# Patient Record
Sex: MALE | Race: WHITE | Employment: PART TIME | URBAN - METROPOLITAN AREA
[De-identification: names, ages, dates, MRNs, and addresses within clinical notes are randomized per-mention and may not be internally consistent; named-entity substitution may affect disease eponyms.]

---

## 2017-02-27 ENCOUNTER — OFFICE VISIT (OUTPATIENT)
Dept: INTERNAL MEDICINE | Age: 67
End: 2017-02-27

## 2017-02-27 VITALS
RESPIRATION RATE: 14 BRPM | DIASTOLIC BLOOD PRESSURE: 70 MMHG | SYSTOLIC BLOOD PRESSURE: 122 MMHG | WEIGHT: 155.2 LBS | HEART RATE: 68 BPM | HEIGHT: 69 IN | BODY MASS INDEX: 22.99 KG/M2

## 2017-02-27 DIAGNOSIS — J30.9 ALLERGIC SINUSITIS: ICD-10-CM

## 2017-02-27 DIAGNOSIS — C80.1 CANCER (HCC): ICD-10-CM

## 2017-02-27 DIAGNOSIS — G43.709 CHRONIC MIGRAINE WITHOUT AURA WITHOUT STATUS MIGRAINOSUS, NOT INTRACTABLE: ICD-10-CM

## 2017-02-27 DIAGNOSIS — C61 PROSTATE CANCER (HCC): ICD-10-CM

## 2017-02-27 DIAGNOSIS — S39.012A LOW BACK STRAIN, INITIAL ENCOUNTER: ICD-10-CM

## 2017-02-27 PROCEDURE — 99214 OFFICE O/P EST MOD 30 MIN: CPT | Performed by: INTERNAL MEDICINE

## 2017-02-27 RX ORDER — MELOXICAM 7.5 MG/1
7.5 TABLET ORAL DAILY
Qty: 30 TABLET | Refills: 3 | Status: SHIPPED | OUTPATIENT
Start: 2017-02-27 | End: 2017-06-14 | Stop reason: ALTCHOICE

## 2017-02-27 ASSESSMENT — ENCOUNTER SYMPTOMS
EYES NEGATIVE: 1
ALLERGIC/IMMUNOLOGIC NEGATIVE: 1
BACK PAIN: 1
GASTROINTESTINAL NEGATIVE: 1
BOWEL INCONTINENCE: 0
RESPIRATORY NEGATIVE: 1

## 2017-06-02 ENCOUNTER — TELEPHONE (OUTPATIENT)
Dept: INTERNAL MEDICINE | Age: 67
End: 2017-06-02

## 2017-06-02 DIAGNOSIS — H40.9 GLAUCOMA, UNSPECIFIED GLAUCOMA, UNSPECIFIED LATERALITY: Primary | ICD-10-CM

## 2017-06-14 ENCOUNTER — OFFICE VISIT (OUTPATIENT)
Dept: INTERNAL MEDICINE | Age: 67
End: 2017-06-14

## 2017-06-14 VITALS
SYSTOLIC BLOOD PRESSURE: 120 MMHG | DIASTOLIC BLOOD PRESSURE: 78 MMHG | WEIGHT: 149.6 LBS | RESPIRATION RATE: 14 BRPM | BODY MASS INDEX: 22.16 KG/M2 | HEIGHT: 69 IN | HEART RATE: 70 BPM

## 2017-06-14 DIAGNOSIS — C80.1 CANCER (HCC): ICD-10-CM

## 2017-06-14 DIAGNOSIS — K21.00 REFLUX ESOPHAGITIS: ICD-10-CM

## 2017-06-14 DIAGNOSIS — S39.012A LOW BACK STRAIN, INITIAL ENCOUNTER: ICD-10-CM

## 2017-06-14 DIAGNOSIS — M72.0 DUPUYTREN'S CONTRACTURE: ICD-10-CM

## 2017-06-14 DIAGNOSIS — M72.0 DUPUYTREN'S CONTRACTURE OF RIGHT HAND: ICD-10-CM

## 2017-06-14 DIAGNOSIS — C61 PROSTATE CANCER (HCC): ICD-10-CM

## 2017-06-14 DIAGNOSIS — R19.5 CHANGE IN STOOL: ICD-10-CM

## 2017-06-14 DIAGNOSIS — G43.709 CHRONIC MIGRAINE WITHOUT AURA WITHOUT STATUS MIGRAINOSUS, NOT INTRACTABLE: Primary | ICD-10-CM

## 2017-06-14 DIAGNOSIS — J30.9 ALLERGIC SINUSITIS: ICD-10-CM

## 2017-06-14 PROCEDURE — 99214 OFFICE O/P EST MOD 30 MIN: CPT | Performed by: INTERNAL MEDICINE

## 2017-06-14 ASSESSMENT — ENCOUNTER SYMPTOMS
RESPIRATORY NEGATIVE: 1
BACK PAIN: 1
ALLERGIC/IMMUNOLOGIC NEGATIVE: 1
GASTROINTESTINAL NEGATIVE: 1
EYES NEGATIVE: 1

## 2017-06-15 ENCOUNTER — TELEPHONE (OUTPATIENT)
Dept: INTERNAL MEDICINE | Age: 67
End: 2017-06-15

## 2017-06-15 DIAGNOSIS — Z11.59 NEED FOR HEPATITIS C SCREENING TEST: Primary | ICD-10-CM

## 2017-06-16 DIAGNOSIS — J30.9 ALLERGIC SINUSITIS: ICD-10-CM

## 2017-06-16 DIAGNOSIS — K21.00 REFLUX ESOPHAGITIS: ICD-10-CM

## 2017-06-16 DIAGNOSIS — S39.012A LOW BACK STRAIN, INITIAL ENCOUNTER: ICD-10-CM

## 2017-06-16 DIAGNOSIS — G43.709 CHRONIC MIGRAINE WITHOUT AURA WITHOUT STATUS MIGRAINOSUS, NOT INTRACTABLE: ICD-10-CM

## 2017-06-16 DIAGNOSIS — Z11.59 NEED FOR HEPATITIS C SCREENING TEST: ICD-10-CM

## 2017-06-16 DIAGNOSIS — C61 PROSTATE CANCER (HCC): ICD-10-CM

## 2017-06-16 DIAGNOSIS — M72.0 DUPUYTREN'S CONTRACTURE OF RIGHT HAND: ICD-10-CM

## 2017-06-16 DIAGNOSIS — C80.1 CANCER (HCC): ICD-10-CM

## 2017-06-16 LAB — HEPATITIS C ANTIBODY INTERPRETATION: NORMAL

## 2017-10-03 ENCOUNTER — OFFICE VISIT (OUTPATIENT)
Dept: ENT CLINIC | Age: 67
End: 2017-10-03

## 2017-10-03 VITALS
HEIGHT: 70 IN | WEIGHT: 145 LBS | DIASTOLIC BLOOD PRESSURE: 63 MMHG | HEART RATE: 72 BPM | SYSTOLIC BLOOD PRESSURE: 106 MMHG | BODY MASS INDEX: 20.76 KG/M2

## 2017-10-03 DIAGNOSIS — J30.9 ALLERGIC SINUSITIS: ICD-10-CM

## 2017-10-03 DIAGNOSIS — H61.23 BILATERAL IMPACTED CERUMEN: Primary | ICD-10-CM

## 2017-10-03 PROCEDURE — 69210 REMOVE IMPACTED EAR WAX UNI: CPT | Performed by: OTOLARYNGOLOGY

## 2017-10-03 PROCEDURE — 99212 OFFICE O/P EST SF 10 MIN: CPT | Performed by: OTOLARYNGOLOGY

## 2017-11-20 ENCOUNTER — OFFICE VISIT (OUTPATIENT)
Dept: INTERNAL MEDICINE | Age: 67
End: 2017-11-20

## 2017-11-20 VITALS
HEART RATE: 68 BPM | DIASTOLIC BLOOD PRESSURE: 78 MMHG | HEIGHT: 69 IN | WEIGHT: 152.4 LBS | SYSTOLIC BLOOD PRESSURE: 122 MMHG | BODY MASS INDEX: 22.57 KG/M2

## 2017-11-20 DIAGNOSIS — C61 PROSTATE CANCER (HCC): ICD-10-CM

## 2017-11-20 DIAGNOSIS — G43.709 CHRONIC MIGRAINE WITHOUT AURA WITHOUT STATUS MIGRAINOSUS, NOT INTRACTABLE: ICD-10-CM

## 2017-11-20 DIAGNOSIS — K21.00 REFLUX ESOPHAGITIS: ICD-10-CM

## 2017-11-20 DIAGNOSIS — R10.33 PERIUMBILICAL ABDOMINAL PAIN: Primary | ICD-10-CM

## 2017-11-20 DIAGNOSIS — R10.33 PERIUMBILICAL ABDOMINAL PAIN: ICD-10-CM

## 2017-11-20 DIAGNOSIS — C80.1 CANCER (HCC): ICD-10-CM

## 2017-11-20 DIAGNOSIS — J30.9 ALLERGIC SINUSITIS: ICD-10-CM

## 2017-11-20 DIAGNOSIS — R19.5 CHANGE IN STOOL: ICD-10-CM

## 2017-11-20 LAB
A/G RATIO: 1.7 (ref 1.1–2.2)
ALBUMIN SERPL-MCNC: 4.4 G/DL (ref 3.4–5)
ALP BLD-CCNC: 61 U/L (ref 40–129)
ALT SERPL-CCNC: 17 U/L (ref 10–40)
ANION GAP SERPL CALCULATED.3IONS-SCNC: 10 MMOL/L (ref 3–16)
AST SERPL-CCNC: 22 U/L (ref 15–37)
BASOPHILS ABSOLUTE: 0 K/UL (ref 0–0.2)
BASOPHILS RELATIVE PERCENT: 0.8 %
BILIRUB SERPL-MCNC: 0.3 MG/DL (ref 0–1)
BUN BLDV-MCNC: 19 MG/DL (ref 7–20)
CALCIUM SERPL-MCNC: 9.7 MG/DL (ref 8.3–10.6)
CHLORIDE BLD-SCNC: 97 MMOL/L (ref 99–110)
CO2: 32 MMOL/L (ref 21–32)
CREAT SERPL-MCNC: 1.2 MG/DL (ref 0.8–1.3)
EOSINOPHILS ABSOLUTE: 0.1 K/UL (ref 0–0.6)
EOSINOPHILS RELATIVE PERCENT: 1.9 %
GFR AFRICAN AMERICAN: >60
GFR NON-AFRICAN AMERICAN: 60
GLOBULIN: 2.6 G/DL
GLUCOSE BLD-MCNC: 87 MG/DL (ref 70–99)
HCT VFR BLD CALC: 40.4 % (ref 40.5–52.5)
HEMOGLOBIN: 13.7 G/DL (ref 13.5–17.5)
LYMPHOCYTES ABSOLUTE: 1.4 K/UL (ref 1–5.1)
LYMPHOCYTES RELATIVE PERCENT: 26.8 %
MCH RBC QN AUTO: 31.1 PG (ref 26–34)
MCHC RBC AUTO-ENTMCNC: 33.8 G/DL (ref 31–36)
MCV RBC AUTO: 92.1 FL (ref 80–100)
MONOCYTES ABSOLUTE: 0.8 K/UL (ref 0–1.3)
MONOCYTES RELATIVE PERCENT: 14.8 %
NEUTROPHILS ABSOLUTE: 2.9 K/UL (ref 1.7–7.7)
NEUTROPHILS RELATIVE PERCENT: 55.7 %
PDW BLD-RTO: 12.9 % (ref 12.4–15.4)
PLATELET # BLD: 192 K/UL (ref 135–450)
PMV BLD AUTO: 8.5 FL (ref 5–10.5)
POTASSIUM SERPL-SCNC: 4.6 MMOL/L (ref 3.5–5.1)
RBC # BLD: 4.39 M/UL (ref 4.2–5.9)
SODIUM BLD-SCNC: 139 MMOL/L (ref 136–145)
TOTAL PROTEIN: 7 G/DL (ref 6.4–8.2)
WBC # BLD: 5.2 K/UL (ref 4–11)

## 2017-11-20 PROCEDURE — 99214 OFFICE O/P EST MOD 30 MIN: CPT | Performed by: INTERNAL MEDICINE

## 2017-11-20 ASSESSMENT — ENCOUNTER SYMPTOMS
BELCHING: 1
EYES NEGATIVE: 1
BACK PAIN: 1
NAUSEA: 0
DIARRHEA: 0
BLOOD IN STOOL: 0
RESPIRATORY NEGATIVE: 1
VOMITING: 0
CONSTIPATION: 0
ALLERGIC/IMMUNOLOGIC NEGATIVE: 1
ABDOMINAL PAIN: 1

## 2017-12-21 ASSESSMENT — LIFESTYLE VARIABLES
HOW OFTEN DO YOU HAVE SIX OR MORE DRINKS ON ONE OCCASION: 0
AUDIT-C TOTAL SCORE: 1
HOW MANY STANDARD DRINKS CONTAINING ALCOHOL DO YOU HAVE ON A TYPICAL DAY: 0
HOW OFTEN DO YOU HAVE A DRINK CONTAINING ALCOHOL: 1

## 2017-12-22 ENCOUNTER — OFFICE VISIT (OUTPATIENT)
Dept: INTERNAL MEDICINE | Age: 67
End: 2017-12-22

## 2017-12-22 VITALS
DIASTOLIC BLOOD PRESSURE: 68 MMHG | HEIGHT: 69 IN | WEIGHT: 147.8 LBS | BODY MASS INDEX: 21.89 KG/M2 | HEART RATE: 68 BPM | RESPIRATION RATE: 14 BRPM | SYSTOLIC BLOOD PRESSURE: 126 MMHG

## 2017-12-22 DIAGNOSIS — M72.0 DUPUYTREN'S CONTRACTURE OF RIGHT HAND: ICD-10-CM

## 2017-12-22 DIAGNOSIS — Z00.00 WELL ADULT EXAM: Primary | ICD-10-CM

## 2017-12-22 DIAGNOSIS — C61 PROSTATE CANCER (HCC): ICD-10-CM

## 2017-12-22 DIAGNOSIS — G43.709 CHRONIC MIGRAINE WITHOUT AURA WITHOUT STATUS MIGRAINOSUS, NOT INTRACTABLE: ICD-10-CM

## 2017-12-22 DIAGNOSIS — R10.33 PERIUMBILICAL ABDOMINAL PAIN: ICD-10-CM

## 2017-12-22 DIAGNOSIS — Z00.00 ROUTINE GENERAL MEDICAL EXAMINATION AT A HEALTH CARE FACILITY: ICD-10-CM

## 2017-12-22 DIAGNOSIS — K21.00 REFLUX ESOPHAGITIS: ICD-10-CM

## 2017-12-22 DIAGNOSIS — J30.9 ALLERGIC SINUSITIS: ICD-10-CM

## 2017-12-22 DIAGNOSIS — C80.1 CANCER (HCC): ICD-10-CM

## 2017-12-22 PROBLEM — H61.23 BILATERAL IMPACTED CERUMEN: Status: RESOLVED | Noted: 2017-10-03 | Resolved: 2017-12-22

## 2017-12-22 PROBLEM — R19.5 CHANGE IN STOOL: Status: RESOLVED | Noted: 2017-06-14 | Resolved: 2017-12-22

## 2017-12-22 PROCEDURE — 99214 OFFICE O/P EST MOD 30 MIN: CPT | Performed by: INTERNAL MEDICINE

## 2017-12-22 PROCEDURE — G0439 PPPS, SUBSEQ VISIT: HCPCS | Performed by: INTERNAL MEDICINE

## 2017-12-22 ASSESSMENT — ENCOUNTER SYMPTOMS
RESPIRATORY NEGATIVE: 1
GASTROINTESTINAL NEGATIVE: 1
EYES NEGATIVE: 1
ALLERGIC/IMMUNOLOGIC NEGATIVE: 1

## 2017-12-22 ASSESSMENT — PATIENT HEALTH QUESTIONNAIRE - PHQ9: SUM OF ALL RESPONSES TO PHQ QUESTIONS 1-9: 0

## 2017-12-22 NOTE — ASSESSMENT & PLAN NOTE
Within normal limits for age- cont to work part time  no ADL issues,immunizations up to date, no depression ,no cognitive impairment  Colonoscopy up to date  Eye exam up to date  Exercises as tolerated    No  living will but does  want resuscitation    Labs pending   D/Wpt

## 2017-12-22 NOTE — PROGRESS NOTES
behavior is normal. Thought content normal.       Assessment:              Plan:                Allergic sinusitis  Prn meds as noted     Cancer (HCC)  Cont as per Dr Brynn Clark     Dupuytren's contracture of right hand  Stable as noted     Migraine  occ symptoms prn meds     Prostate cancer ([address]  Cont as per Dr Brynn Clark    Reflux esophagitis  Controled with diet and meds     Well adult exam  Within normal limits for age- cont to work part time  no ADL issues,immunizations up to date, no depression ,no cognitive impairment  Colonoscopy up to date  Eye exam up to date  Exercises as tolerated    No  living will but does  want resuscitation    Labs pending   D/Wpt     Periumbilical abdominal pain  To have EGD and colonoscopy as noted      Adra Darius Nick  1950    No Known Allergies  Current Outpatient Prescriptions   Medication Sig Dispense Refill    fluticasone (FLONASE) 50 MCG/ACT nasal spray 1 spray by Nasal route daily      latanoprost (XALATAN) 0.005 % ophthalmic solution Place 1 drop into the right eye nightly      omeprazole (PRILOSEC) 20 MG capsule TAKE 1 CAPSULE DAILY 90 capsule 0     No current facility-administered medications for this visit. Vitals:    12/22/17 0958   BP: 126/68   Site: Right Arm   Position: Sitting   Cuff Size: Medium Adult   Pulse: 68   Resp: 14   Weight: 147 lb 12.8 oz (67 kg)   Height: 5' 9\" (1.753 m)     Body mass index is 21.83 kg/m².      Wt Readings from Last 3 Encounters:   12/22/17 147 lb 12.8 oz (67 kg)   11/20/17 152 lb 6.4 oz (69.1 kg)   10/03/17 145 lb (65.8 kg)     BP Readings from Last 3 Encounters:   12/22/17 126/68   11/20/17 122/78   10/03/17 106/63       Consultants:   Patient Care Team:  Pascale Cummings MD as PCP - General (Internal Medicine)  Pascale Cummings MD as PCP - [facility] Attributed Provider  Joanne Bernabe MD as Consulting Physician (Urology)  Aba Puga MD as Consulting Physician (Radiation Oncology)  Leigh Moseley MD as Consulting Nocturia     Reflux esophagitis     Shoulder pain      Past Surgical History:   Procedure Laterality Date    COLONOSCOPY   &     Dr Maile Mccann repeat 2018   [address]    detached retina bilat     FINGER TRIGGER RELEASE Left 13    middle finger    PROSTATE SURGERY  10/2013    mult Bx  1 +Bx -s/p cyber knife 2014     Family History   Problem Relation Age of Onset    Cancer Mother      Social History     Social History    Marital status: Single     Spouse name: N/A    Number of children: N/A    Years of education: N/A     Occupational History          Social History Main Topics    Smoking status: Former Smoker     Quit date: 1980    Smokeless tobacco: Never Used    Alcohol use 0.0 oz/week      Comment: occ    Drug use: Yes    Sexual activity: Not on file     Other Topics Concern    Not on file     Social History Narrative    No narrative on file     }        Visual Acuity: Corrected:            L  20/100            R 20/20    Cognitive Screening:  Clock drawing test score: 5/5. Mini-mental status exam score: not needed. Assessment/Plan:  Bradley Mccoy was seen today for medicare awv. Diagnoses and all orders for this visit:    Allergic sinusitis    Cancer ([facility] [place] [address]    Dupuytren's contracture of right hand    Chronic migraine without aura without status migrainosus, not intractable    Prostate cancer ([facility] [place] [address]    Reflux esophagitis    Well adult exam    Periumbilical abdominal pain      Medicare Annual Wellness Visit  Name: Donald Gould Date: 2017   MRN: [id number] Sex: Male   Age: 69 y.o. Ethnicity: Non-/Non    : 1950 Race: Shay Nick is here for Medicare AWV    Screenings for behavioral, psychosocial and functional/safety risks, and cognitive dysfunction are all negative except as indicated below.  These results, as well as other patient data from the Health Risk Assessment form, are documented in Flowsheets linked to this Encounter. No Known Allergies  Prior to Visit Medications    Medication Sig Taking? Authorizing Provider   fluticasone (FLONASE) 50 MCG/ACT nasal spray 1 spray by Nasal route daily Yes Historical Provider, MD   latanoprost (XALATAN) 0.005 % ophthalmic solution Place 1 drop into the right eye nightly Yes Historical Provider, MD   omeprazole (PRILOSEC) 20 MG capsule TAKE 1 CAPSULE DAILY Yes Baldev Vogt MD     Past Medical History:   Diagnosis Date    Cancer [facility]) 2014    prostate Dr Sherie Coto s/p rads with cyber knife     Dupuytrens contracture     Foot pain, left     Migraine     Mole (skin)     Nocturia     Reflux esophagitis     Shoulder pain      Past Surgical History:   Procedure Laterality Date    COLONOSCOPY  2000 & 2013    Dr Justin Grams repeat 2018   [address]    detached retina bilat     FINGER TRIGGER RELEASE Left 8/13/13    middle finger    PROSTATE SURGERY  10/2013    mult Bx  1 +Bx -s/p cyber knife 9/2014     Family History   Problem Relation Age of Onset    Cancer Mother        CareTeam (Including outside providers/suppliers regularly involved in providing care):   Patient Care Team:  Baldev Vogt MD as PCP - General (Internal Medicine)  Baldev Vogt MD as PCP - [facility] Attributed Provider  Mango Terrazas MD as Consulting Physician (Urology)  Leandra Alonso MD as Consulting Physician (Radiation Oncology)  Regina Huggins MD as Consulting Physician (Otolaryngology)    Wt Readings from Last 3 Encounters:   12/22/17 147 lb 12.8 oz (67 kg)   11/20/17 152 lb 6.4 oz (69.1 kg)   10/03/17 145 lb (65.8 kg)     Vitals:    12/22/17 0958   BP: 126/68   Site: Right Arm   Position: Sitting   Cuff Size: Medium Adult   Pulse: 68   Resp: 14   Weight: 147 lb 12.8 oz (67 kg)   Height: 5' 9\" (1.753 m)       See above    Patient's complete Health Risk Assessment and screening values have been reviewed and are found in Flowsheets.  The following problems were reviewed today and where indicated follow up appointments were made and/or referrals ordered.     Positive Risk Factor Screenings with Interventions:     Substance Abuse:  Social History     Tobacco History     Smoking Status  Former Smoker Quit date  1/1/1980    Smokeless Tobacco Use  Never Used          Alcohol History     Alcohol Use Status  Yes Drinks/Week  0 Standard drinks or equivalent per week Amount  0.0 oz alcohol/wk Comment  occ          Drug Use     Drug Use Status  Yes          Sexual Activity     Sexually Active  Not Asked               Audit Questionnaire: Screen for Alcohol Misuse  How often do you have a drink containing alcohol?: Monthly or less  How many standard drinks containing alcohol do you have on a typical day when drinking?: One or two  How often do you have six or more drinks on one occasion?: Never  Audit-C Score: 1  Substance Abuse Interventions:  · None indicated    General Health:  General  In general, how would you say your health is?: Good  In the past 7 days, have you experienced any of the following?: (!) (P) Stress  Do you get the social and emotional support that you need?: (P) Yes  Do you have a Living Will?: (!) (P) No  General Health Risk Interventions:  · No Living Will: additional information provided    Safety:  Safety  Do you have working smoke detectors?: (P) Yes  Have all throw rugs been removed or fastened?: (!) (P) No  Do you have non-slip mats in all bathtubs?: (!) (P) No  Do all of your stairways have a railing or banister?: (P) Yes  Are your doorways, halls and stairs free of clutter?: (P) Yes  Do you always fasten your seatbelt when you are in a car?: (P) Yes  Safety Interventions:  · Home safety tips provided      Personalized Preventive Plan   Current Health Maintenance Status  Immunization History   Administered Date(s) Administered    Influenza, High Dose 09/20/2014, 10/27/2015, 10/14/2016, 10/20/2017    Pneumococcal 13-valent Conjugate (Birtha Pries) 07/22/2015  Pneumococcal Polysaccharide (Edyotuswy14) 02/18/2013    Tdap (Boostrix, Adacel) 09/06/2008    Zoster 12/30/2015        Health Maintenance   Topic Date Due    AAA screen  1950    Colon cancer screen colonoscopy  06/25/2018    DTaP/Tdap/Td vaccine (2 - Td) 09/06/2018    Lipid screen  08/01/2021    Zostavax vaccine  Completed    Flu vaccine  Completed    Pneumococcal low/med risk  Completed    Hepatitis C screen  Completed     Recommendations for Preventive Services Due: see orders.   Recommended screening schedule for the next 5-10 years is provided to the patient in written form: see Patient Instructions/AVS.

## 2018-03-27 ENCOUNTER — HOSPITAL ENCOUNTER (OUTPATIENT)
Dept: CT IMAGING | Age: 68
Discharge: HOME | End: 2018-03-27
Attending: INTERNAL MEDICINE | Admitting: INTERNAL MEDICINE

## 2018-03-27 DIAGNOSIS — R10.33 PERIUMBILICAL PAIN: ICD-10-CM

## 2018-03-27 DIAGNOSIS — R10.33 ABDOMINAL CRAMPING, PERIUMBILICAL: ICD-10-CM

## 2018-05-14 ENCOUNTER — OFFICE VISIT (OUTPATIENT)
Dept: INTERNAL MEDICINE | Age: 68
End: 2018-05-14

## 2018-05-14 VITALS
HEIGHT: 69 IN | DIASTOLIC BLOOD PRESSURE: 60 MMHG | BODY MASS INDEX: 22.54 KG/M2 | RESPIRATION RATE: 14 BRPM | SYSTOLIC BLOOD PRESSURE: 110 MMHG | HEART RATE: 66 BPM | WEIGHT: 152.2 LBS

## 2018-05-14 DIAGNOSIS — K21.00 REFLUX ESOPHAGITIS: ICD-10-CM

## 2018-05-14 DIAGNOSIS — R42 VERTIGO: ICD-10-CM

## 2018-05-14 DIAGNOSIS — C61 PROSTATE CANCER (HCC): ICD-10-CM

## 2018-05-14 DIAGNOSIS — G43.709 CHRONIC MIGRAINE WITHOUT AURA WITHOUT STATUS MIGRAINOSUS, NOT INTRACTABLE: ICD-10-CM

## 2018-05-14 DIAGNOSIS — J30.9 ALLERGIC SINUSITIS: ICD-10-CM

## 2018-05-14 PROCEDURE — 99214 OFFICE O/P EST MOD 30 MIN: CPT | Performed by: INTERNAL MEDICINE

## 2018-05-14 ASSESSMENT — ENCOUNTER SYMPTOMS
ALLERGIC/IMMUNOLOGIC NEGATIVE: 1
GASTROINTESTINAL NEGATIVE: 1
RHINORRHEA: 1
SINUS PRESSURE: 0
EYES NEGATIVE: 1
RESPIRATORY NEGATIVE: 1

## 2018-09-05 ENCOUNTER — TELEPHONE (OUTPATIENT)
Dept: INTERNAL MEDICINE CLINIC | Age: 68
End: 2018-09-05

## 2018-09-05 DIAGNOSIS — R10.33 PERIUMBILICAL ABDOMINAL PAIN: ICD-10-CM

## 2018-09-05 NOTE — TELEPHONE ENCOUNTER
Patient called in stating he has been having abdominal pain  The pain has been going on and off for awhile. He keeps getting light headed throughout the day. He would like to have some blood work done and he would also like to set up an appointment sooner than October. Please call.

## 2018-09-05 NOTE — TELEPHONE ENCOUNTER
Left message on machine for patient per Dr. Halle Sahu. Orders are placed in Epic and Patient advised.

## 2018-10-08 ENCOUNTER — OFFICE VISIT (OUTPATIENT)
Dept: INTERNAL MEDICINE CLINIC | Age: 68
End: 2018-10-08
Payer: COMMERCIAL

## 2018-10-08 VITALS
RESPIRATION RATE: 16 BRPM | SYSTOLIC BLOOD PRESSURE: 126 MMHG | WEIGHT: 147 LBS | DIASTOLIC BLOOD PRESSURE: 78 MMHG | BODY MASS INDEX: 21.77 KG/M2 | HEART RATE: 68 BPM | HEIGHT: 69 IN

## 2018-10-08 DIAGNOSIS — G43.709 CHRONIC MIGRAINE WITHOUT AURA WITHOUT STATUS MIGRAINOSUS, NOT INTRACTABLE: ICD-10-CM

## 2018-10-08 DIAGNOSIS — C61 PROSTATE CANCER (HCC): ICD-10-CM

## 2018-10-08 DIAGNOSIS — R53.82 CHRONIC FATIGUE: Primary | ICD-10-CM

## 2018-10-08 DIAGNOSIS — F41.9 ANXIETY: ICD-10-CM

## 2018-10-08 DIAGNOSIS — K21.00 REFLUX ESOPHAGITIS: ICD-10-CM

## 2018-10-08 DIAGNOSIS — S39.012D STRAIN OF LUMBAR REGION, SUBSEQUENT ENCOUNTER: ICD-10-CM

## 2018-10-08 PROCEDURE — 99214 OFFICE O/P EST MOD 30 MIN: CPT | Performed by: INTERNAL MEDICINE

## 2018-10-08 ASSESSMENT — ENCOUNTER SYMPTOMS
ALLERGIC/IMMUNOLOGIC NEGATIVE: 1
RESPIRATORY NEGATIVE: 1
SINUS PRESSURE: 0
GASTROINTESTINAL NEGATIVE: 1
RHINORRHEA: 1
EYES NEGATIVE: 1

## 2018-10-08 ASSESSMENT — PATIENT HEALTH QUESTIONNAIRE - PHQ9
1. LITTLE INTEREST OR PLEASURE IN DOING THINGS: 0
SUM OF ALL RESPONSES TO PHQ9 QUESTIONS 1 & 2: 0
2. FEELING DOWN, DEPRESSED OR HOPELESS: 0
SUM OF ALL RESPONSES TO PHQ QUESTIONS 1-9: 0
SUM OF ALL RESPONSES TO PHQ QUESTIONS 1-9: 0

## 2018-10-08 NOTE — PROGRESS NOTES
rhinorrhea. Negative for ear discharge, ear pain and sinus pressure. Eyes: Negative. Cataract L eye,and Glaucoma R eye    Respiratory: Negative. Cardiovascular: Negative. Gastrointestinal: Negative. Occ GERD   Repeat colonoscopy 2013 polyp repeat 2018   Endocrine: Negative. Genitourinary: Positive for frequency and urgency. Occ nocturia-1 + BX for prostate CA  S/p cyber knife 9/2014 now PSA decreased  as noted cont with Dr Morena Willams now PSA has gone up a little to F/U as noted    Musculoskeletal: Positive for arthralgias (occ L shoulder ). Skin: Negative. Allergic/Immunologic: Negative. Neurological: Positive for dizziness and headaches. Hematological: Negative. Psychiatric/Behavioral: Negative. Objective:   Physical Exam:  Physical Exam   Constitutional: He is oriented to person, place, and time. He appears well-developed and well-nourished. HENT:   Head: Normocephalic and atraumatic. Nose: Nose normal.   Mouth/Throat: Oropharynx is clear and moist.   Eyes: Pupils are equal, round, and reactive to light. Conjunctivae and EOM are normal.   Neck: Normal range of motion. Neck supple. No tracheal deviation present. No thyromegaly present. Cardiovascular: Normal rate, regular rhythm, normal heart sounds and intact distal pulses. Pulmonary/Chest: Effort normal and breath sounds normal. No respiratory distress. He has no wheezes. Abdominal: Soft. Bowel sounds are normal.   Genitourinary:   Genitourinary Comments: As per Dr Morena Willams    Musculoskeletal: Normal range of motion. Neurological: He is alert and oriented to person, place, and time. He has normal reflexes. Skin: Skin is warm and dry. Psychiatric: He has a normal mood and affect.  His behavior is normal. Thought content normal.       /78 (Site: Right Upper Arm, Position: Sitting, Cuff Size: Medium Adult)   Pulse 68   Resp 16   Ht 5' 9\" (1.753 m)   Wt 147 lb (66.7 kg)   BMI 21.71 kg/m²

## 2018-12-24 ENCOUNTER — OFFICE VISIT (OUTPATIENT)
Dept: INTERNAL MEDICINE CLINIC | Age: 68
End: 2018-12-24
Payer: MEDICARE

## 2018-12-24 VITALS
BODY MASS INDEX: 22.31 KG/M2 | SYSTOLIC BLOOD PRESSURE: 122 MMHG | HEART RATE: 66 BPM | RESPIRATION RATE: 12 BRPM | HEIGHT: 69 IN | DIASTOLIC BLOOD PRESSURE: 70 MMHG | WEIGHT: 150.6 LBS

## 2018-12-24 DIAGNOSIS — S39.012D STRAIN OF LUMBAR REGION, SUBSEQUENT ENCOUNTER: ICD-10-CM

## 2018-12-24 DIAGNOSIS — C80.1 CANCER (HCC): ICD-10-CM

## 2018-12-24 DIAGNOSIS — F41.9 ANXIETY: ICD-10-CM

## 2018-12-24 DIAGNOSIS — M72.0 DUPUYTREN'S CONTRACTURE OF RIGHT HAND: ICD-10-CM

## 2018-12-24 DIAGNOSIS — C61 PROSTATE CANCER (HCC): ICD-10-CM

## 2018-12-24 DIAGNOSIS — R42 VERTIGO: ICD-10-CM

## 2018-12-24 DIAGNOSIS — G43.709 CHRONIC MIGRAINE WITHOUT AURA WITHOUT STATUS MIGRAINOSUS, NOT INTRACTABLE: ICD-10-CM

## 2018-12-24 DIAGNOSIS — M54.32 SCIATICA OF LEFT SIDE: ICD-10-CM

## 2018-12-24 DIAGNOSIS — K21.00 REFLUX ESOPHAGITIS: ICD-10-CM

## 2018-12-24 DIAGNOSIS — J30.9 ALLERGIC SINUSITIS: ICD-10-CM

## 2018-12-24 DIAGNOSIS — Z00.00 ROUTINE GENERAL MEDICAL EXAMINATION AT A HEALTH CARE FACILITY: ICD-10-CM

## 2018-12-24 DIAGNOSIS — Z00.00 WELL ADULT EXAM: Primary | ICD-10-CM

## 2018-12-24 DIAGNOSIS — M72.0 DUPUYTREN'S CONTRACTURE: ICD-10-CM

## 2018-12-24 PROCEDURE — 99214 OFFICE O/P EST MOD 30 MIN: CPT | Performed by: INTERNAL MEDICINE

## 2018-12-24 PROCEDURE — G0439 PPPS, SUBSEQ VISIT: HCPCS | Performed by: INTERNAL MEDICINE

## 2018-12-24 ASSESSMENT — PATIENT HEALTH QUESTIONNAIRE - PHQ9
SUM OF ALL RESPONSES TO PHQ QUESTIONS 1-9: 0
SUM OF ALL RESPONSES TO PHQ QUESTIONS 1-9: 0

## 2018-12-24 ASSESSMENT — LIFESTYLE VARIABLES
AUDIT-C TOTAL SCORE: 1
HOW OFTEN DO YOU HAVE SIX OR MORE DRINKS ON ONE OCCASION: 0
HOW MANY STANDARD DRINKS CONTAINING ALCOHOL DO YOU HAVE ON A TYPICAL DAY: 0
HOW OFTEN DO YOU HAVE A DRINK CONTAINING ALCOHOL: 1

## 2018-12-24 ASSESSMENT — ENCOUNTER SYMPTOMS
GASTROINTESTINAL NEGATIVE: 1
RESPIRATORY NEGATIVE: 1
EYES NEGATIVE: 1
ALLERGIC/IMMUNOLOGIC NEGATIVE: 1

## 2018-12-24 ASSESSMENT — ANXIETY QUESTIONNAIRES: GAD7 TOTAL SCORE: 1

## 2018-12-24 NOTE — PROGRESS NOTES
Subjective:      Patient ID: Robert Clark is a 77 y.o. male. HPI  Here today for medicare  complete physical and review of chronic problems as listed under assessment and plan,no new c/o feels goods          No Known Allergies    Current Outpatient Prescriptions   Medication Sig Dispense Refill    latanoprost (XALATAN) 0.005 % ophthalmic solution Place 1 drop into the right eye nightly      omeprazole (PRILOSEC) 20 MG capsule TAKE 1 CAPSULE DAILY 90 capsule 0     No current facility-administered medications for this visit. Past Medical History:   Diagnosis Date    [facility]    prostate Dr Moriah Murdock s/p rads with cyber knife     Dupuytrens contracture     Foot pain, left     Migraine     Mole (skin)     Nocturia     Reflux esophagitis     Shoulder pain        Family History   Problem Relation Age of Onset    Cancer Mother     Stroke Brother        Past Surgical History:   Procedure Laterality Date    COLONOSCOPY  2000 & 2013    Dr Hurtado Look repeat 2018    COLONOSCOPY  02/08/2018    Dr. Fernando Carr polyp removed0- repeat 2022   [address]    detached retina bilat     FINGER TRIGGER RELEASE Left 8/13/13    middle finger    PROSTATE SURGERY  10/2013    mult Bx  1 +Bx -s/p cyber knife 9/2014       Social History     Social History    Marital status: Single     Spouse name: N/A    Number of children: N/A    Years of education: N/A     Occupational History          Social History Main Topics    Smoking status: Former Smoker     Packs/day: 1.00     Years: 10.00     Quit date: 1/1/1980    Smokeless tobacco: Never Used    Alcohol use 0.0 oz/week      Comment: occ    Drug use: Yes    Sexual activity: Not on file     Other Topics Concern    Not on file     Social History Narrative    No narrative on file       Review of Systems  Review of Systems   Constitutional: Negative. HENT: Negative. Eyes: Negative.           S/P cataracts   Respiratory:

## 2018-12-24 NOTE — ASSESSMENT & PLAN NOTE
Within normal limits for age- cont to work no ADL issues,immunizations up to date, no depression ,no cognitive impairment  Colonoscopy up to date  Eye exam up to date  Exercises as tolerated    No living will but does not want resuscitation   Findings and recommendations discussed with Pt

## 2019-01-08 ENCOUNTER — HOSPITAL ENCOUNTER (OUTPATIENT)
Dept: MRI IMAGING | Age: 69
Discharge: HOME | End: 2019-01-08
Payer: MEDICARE

## 2019-01-08 DIAGNOSIS — S39.012D STRAIN OF LUMBAR REGION, SUBSEQUENT ENCOUNTER: ICD-10-CM

## 2019-01-08 DIAGNOSIS — M54.32 SCIATICA OF LEFT SIDE: ICD-10-CM

## 2019-01-08 PROCEDURE — 72148 MRI LUMBAR SPINE W/O DYE: CPT

## 2019-01-09 ENCOUNTER — TELEPHONE (OUTPATIENT)
Dept: INTERNAL MEDICINE CLINIC | Age: 69
End: 2019-01-09

## 2019-01-10 ENCOUNTER — TELEPHONE (OUTPATIENT)
Dept: INTERNAL MEDICINE CLINIC | Age: 69
End: 2019-01-10

## 2019-01-10 DIAGNOSIS — M43.06 LUMBAR SPONDYLOLYSIS: ICD-10-CM

## 2019-01-10 DIAGNOSIS — M51.26 LUMBAR DISC HERNIATION: Primary | ICD-10-CM

## 2019-01-16 ENCOUNTER — TELEPHONE (OUTPATIENT)
Dept: INTERNAL MEDICINE CLINIC | Age: 69
End: 2019-01-16

## 2019-01-23 PROBLEM — Z00.00 WELL ADULT EXAM: Status: RESOLVED | Noted: 2018-12-24 | Resolved: 2019-01-23

## 2019-02-12 ENCOUNTER — HOSPITAL ENCOUNTER (OUTPATIENT)
Age: 69
Discharge: HOME | End: 2019-02-12
Payer: MEDICARE

## 2019-02-12 ENCOUNTER — HOSPITAL ENCOUNTER (OUTPATIENT)
Dept: GENERAL RADIOLOGY | Age: 69
Discharge: HOME | End: 2019-02-12
Payer: MEDICARE

## 2019-02-12 ENCOUNTER — OFFICE VISIT (OUTPATIENT)
Dept: INTERNAL MEDICINE CLINIC | Age: 69
End: 2019-02-12
Payer: MEDICARE

## 2019-02-12 VITALS
WEIGHT: 157.2 LBS | HEART RATE: 66 BPM | RESPIRATION RATE: 14 BRPM | HEIGHT: 69 IN | SYSTOLIC BLOOD PRESSURE: 122 MMHG | BODY MASS INDEX: 23.28 KG/M2 | DIASTOLIC BLOOD PRESSURE: 70 MMHG

## 2019-02-12 DIAGNOSIS — C61 PROSTATE CANCER (HCC): ICD-10-CM

## 2019-02-12 DIAGNOSIS — M25.542 PAIN IN THUMB JOINT WITH MOVEMENT, LEFT: ICD-10-CM

## 2019-02-12 DIAGNOSIS — K21.00 REFLUX ESOPHAGITIS: ICD-10-CM

## 2019-02-12 DIAGNOSIS — J30.9 ALLERGIC SINUSITIS: ICD-10-CM

## 2019-02-12 DIAGNOSIS — M25.542 PAIN IN THUMB JOINT WITH MOVEMENT, LEFT: Primary | ICD-10-CM

## 2019-02-12 PROCEDURE — 73130 X-RAY EXAM OF HAND: CPT

## 2019-02-12 PROCEDURE — 99214 OFFICE O/P EST MOD 30 MIN: CPT | Performed by: INTERNAL MEDICINE

## 2019-02-12 ASSESSMENT — ENCOUNTER SYMPTOMS
RESPIRATORY NEGATIVE: 1
ALLERGIC/IMMUNOLOGIC NEGATIVE: 1
GASTROINTESTINAL NEGATIVE: 1
EYES NEGATIVE: 1

## 2019-02-12 ASSESSMENT — PATIENT HEALTH QUESTIONNAIRE - PHQ9
SUM OF ALL RESPONSES TO PHQ QUESTIONS 1-9: 0
1. LITTLE INTEREST OR PLEASURE IN DOING THINGS: 0
SUM OF ALL RESPONSES TO PHQ9 QUESTIONS 1 & 2: 0
SUM OF ALL RESPONSES TO PHQ QUESTIONS 1-9: 0
2. FEELING DOWN, DEPRESSED OR HOPELESS: 0

## 2019-03-06 ENCOUNTER — OFFICE VISIT (OUTPATIENT)
Dept: INTERNAL MEDICINE CLINIC | Age: 69
End: 2019-03-06
Payer: MEDICARE

## 2019-03-06 VITALS
HEART RATE: 66 BPM | DIASTOLIC BLOOD PRESSURE: 60 MMHG | HEIGHT: 69 IN | WEIGHT: 158 LBS | RESPIRATION RATE: 12 BRPM | SYSTOLIC BLOOD PRESSURE: 120 MMHG | BODY MASS INDEX: 23.4 KG/M2

## 2019-03-06 DIAGNOSIS — S39.012D STRAIN OF LUMBAR REGION, SUBSEQUENT ENCOUNTER: ICD-10-CM

## 2019-03-06 DIAGNOSIS — Z01.818 PRE-OP EXAMINATION: ICD-10-CM

## 2019-03-06 DIAGNOSIS — K21.00 REFLUX ESOPHAGITIS: ICD-10-CM

## 2019-03-06 DIAGNOSIS — Z01.818 PREOP EXAMINATION: ICD-10-CM

## 2019-03-06 DIAGNOSIS — C61 PROSTATE CANCER (HCC): ICD-10-CM

## 2019-03-06 DIAGNOSIS — G43.709 CHRONIC MIGRAINE WITHOUT AURA WITHOUT STATUS MIGRAINOSUS, NOT INTRACTABLE: ICD-10-CM

## 2019-03-06 DIAGNOSIS — Z01.818 PRE-OP EXAMINATION: Primary | ICD-10-CM

## 2019-03-06 LAB
A/G RATIO: 1.9 (ref 1.1–2.2)
ALBUMIN SERPL-MCNC: 4.6 G/DL (ref 3.4–5)
ALP BLD-CCNC: 65 U/L (ref 40–129)
ALT SERPL-CCNC: 22 U/L (ref 10–40)
ANION GAP SERPL CALCULATED.3IONS-SCNC: 14 MMOL/L (ref 3–16)
AST SERPL-CCNC: 26 U/L (ref 15–37)
BASOPHILS ABSOLUTE: 0 K/UL (ref 0–0.2)
BASOPHILS RELATIVE PERCENT: 0.7 %
BILIRUB SERPL-MCNC: 0.4 MG/DL (ref 0–1)
BUN BLDV-MCNC: 17 MG/DL (ref 7–20)
CALCIUM SERPL-MCNC: 9.7 MG/DL (ref 8.3–10.6)
CHLORIDE BLD-SCNC: 102 MMOL/L (ref 99–110)
CO2: 27 MMOL/L (ref 21–32)
CREAT SERPL-MCNC: 1.2 MG/DL (ref 0.8–1.3)
EOSINOPHILS ABSOLUTE: 0 K/UL (ref 0–0.6)
EOSINOPHILS RELATIVE PERCENT: 0.5 %
GFR AFRICAN AMERICAN: >60
GFR NON-AFRICAN AMERICAN: 60
GLOBULIN: 2.4 G/DL
GLUCOSE BLD-MCNC: 78 MG/DL (ref 70–99)
HCT VFR BLD CALC: 42.8 % (ref 40.5–52.5)
HEMOGLOBIN: 14.1 G/DL (ref 13.5–17.5)
LYMPHOCYTES ABSOLUTE: 0.8 K/UL (ref 1–5.1)
LYMPHOCYTES RELATIVE PERCENT: 13.6 %
MCH RBC QN AUTO: 30.6 PG (ref 26–34)
MCHC RBC AUTO-ENTMCNC: 33 G/DL (ref 31–36)
MCV RBC AUTO: 92.9 FL (ref 80–100)
MONOCYTES ABSOLUTE: 0.6 K/UL (ref 0–1.3)
MONOCYTES RELATIVE PERCENT: 10.4 %
NEUTROPHILS ABSOLUTE: 4.6 K/UL (ref 1.7–7.7)
NEUTROPHILS RELATIVE PERCENT: 74.8 %
PDW BLD-RTO: 13.7 % (ref 12.4–15.4)
PLATELET # BLD: 205 K/UL (ref 135–450)
PMV BLD AUTO: 8.7 FL (ref 5–10.5)
POTASSIUM SERPL-SCNC: 5.1 MMOL/L (ref 3.5–5.1)
RBC # BLD: 4.61 M/UL (ref 4.2–5.9)
SODIUM BLD-SCNC: 143 MMOL/L (ref 136–145)
TOTAL PROTEIN: 7 G/DL (ref 6.4–8.2)
WBC # BLD: 6.2 K/UL (ref 4–11)

## 2019-03-06 PROCEDURE — 93000 ELECTROCARDIOGRAM COMPLETE: CPT | Performed by: INTERNAL MEDICINE

## 2019-03-06 PROCEDURE — 99215 OFFICE O/P EST HI 40 MIN: CPT | Performed by: INTERNAL MEDICINE

## 2019-03-06 ASSESSMENT — ENCOUNTER SYMPTOMS
EYES NEGATIVE: 1
GASTROINTESTINAL NEGATIVE: 1
RESPIRATORY NEGATIVE: 1
ALLERGIC/IMMUNOLOGIC NEGATIVE: 1

## 2020-02-05 ENCOUNTER — OFFICE VISIT (OUTPATIENT)
Dept: INTERNAL MEDICINE CLINIC | Age: 70
End: 2020-02-05
Payer: MEDICARE

## 2020-02-05 VITALS
HEART RATE: 70 BPM | SYSTOLIC BLOOD PRESSURE: 120 MMHG | RESPIRATION RATE: 14 BRPM | HEIGHT: 69 IN | BODY MASS INDEX: 22.48 KG/M2 | WEIGHT: 151.8 LBS | DIASTOLIC BLOOD PRESSURE: 82 MMHG

## 2020-02-05 PROBLEM — K64.4 EXTERNAL HEMORRHOID: Status: ACTIVE | Noted: 2020-02-05

## 2020-02-05 PROCEDURE — 99214 OFFICE O/P EST MOD 30 MIN: CPT | Performed by: INTERNAL MEDICINE

## 2020-02-05 ASSESSMENT — ENCOUNTER SYMPTOMS
RECTAL PAIN: 0
EYES NEGATIVE: 1
RESPIRATORY NEGATIVE: 1
ANAL BLEEDING: 1
CONSTIPATION: 0
DIARRHEA: 0
ALLERGIC/IMMUNOLOGIC NEGATIVE: 1

## 2020-02-05 ASSESSMENT — PATIENT HEALTH QUESTIONNAIRE - PHQ9
SUM OF ALL RESPONSES TO PHQ QUESTIONS 1-9: 0
SUM OF ALL RESPONSES TO PHQ QUESTIONS 1-9: 0
2. FEELING DOWN, DEPRESSED OR HOPELESS: 0
1. LITTLE INTEREST OR PLEASURE IN DOING THINGS: 0
SUM OF ALL RESPONSES TO PHQ9 QUESTIONS 1 & 2: 0

## 2020-02-05 NOTE — ASSESSMENT & PLAN NOTE
Cont with Dr Storm Risk is going to [facility] W/U since PSA is elevated from 7 to 14 in the last 6 mo

## 2020-02-05 NOTE — PROGRESS NOTES
[email or web address]    Smokeless tobacco: Never Used   Substance and Sexual Activity    Alcohol use: Yes     Alcohol/week: 0.0 standard drinks     Comment: occ    Drug use: Yes    Sexual activity: Not on file   Lifestyle    Physical activity:     Days per week: Not on file     Minutes per session: Not on file    Stress: Not on file   Relationships    Social connections:     Talks on phone: Not on file     Gets together: Not on file     Attends Jain service: Not on file     Active member of club or organization: Not on file     Attends meetings of clubs or organizations: Not on file     Relationship status: Not on file    Intimate partner violence:     Fear of current or ex partner: Not on file     Emotionally abused: Not on file     Physically abused: Not on file     Forced sexual activity: Not on file   Other Topics Concern    Not on file   Social History Narrative    Not on file       Review of Systems  Review of Systems   Constitutional: Negative. HENT: Negative. Eyes: Negative. S/P cataracts   Respiratory: Negative. Cardiovascular: Negative. Gastrointestinal: Positive for anal bleeding. Negative for constipation, diarrhea and rectal pain. Occ GERD   Repeat colonoscopy  polyp-- repeat   REctal lump as per HPI   Endocrine: Negative. Genitourinary: Positive for frequency and urgency. Occ nocturia-1 + BX for prostate CA  S/p cyber knife 2014 now PSA decreased  as noted cont with Dr Lennie Song now PSA has gone up to 5   F/U as noted  Had a PET scan that was negative - Bx as per Dr Lennie Song    Musculoskeletal: Positive for arthralgias (occ L shoulder ). Skin: Negative. Allergic/Immunologic: Negative. Neurological: Positive for headaches. Hematological: Negative. Psychiatric/Behavioral: Negative. Objective:   Physical Exam:  Physical Exam  Constitutional:       Appearance: He is well-developed.    HENT:      Head: Normocephalic and

## 2020-02-05 NOTE — ASSESSMENT & PLAN NOTE
Cont with Dr Huan Corcoran is going to [facility] since PSA is elevated from 7 to 14 in the last 6 mo

## 2020-06-22 DIAGNOSIS — C80.1 CANCER (HCC): ICD-10-CM

## 2020-06-22 DIAGNOSIS — K21.00 REFLUX ESOPHAGITIS: ICD-10-CM

## 2020-06-22 DIAGNOSIS — C61 PROSTATE CANCER (HCC): ICD-10-CM

## 2020-06-22 DIAGNOSIS — J30.9 ALLERGIC SINUSITIS: ICD-10-CM

## 2020-06-22 DIAGNOSIS — S39.012S STRAIN OF LUMBAR REGION, SEQUELA: ICD-10-CM

## 2020-06-22 DIAGNOSIS — G43.709 CHRONIC MIGRAINE WITHOUT AURA WITHOUT STATUS MIGRAINOSUS, NOT INTRACTABLE: ICD-10-CM

## 2020-06-22 DIAGNOSIS — K64.4 EXTERNAL HEMORRHOID: ICD-10-CM

## 2020-06-22 LAB
A/G RATIO: 2 (ref 1.1–2.2)
ALBUMIN SERPL-MCNC: 4.3 G/DL (ref 3.4–5)
ALP BLD-CCNC: 57 U/L (ref 40–129)
ALT SERPL-CCNC: 14 U/L (ref 10–40)
ANION GAP SERPL CALCULATED.3IONS-SCNC: 13 MMOL/L (ref 3–16)
AST SERPL-CCNC: 18 U/L (ref 15–37)
BASOPHILS ABSOLUTE: 0 K/UL (ref 0–0.2)
BASOPHILS RELATIVE PERCENT: 1 %
BILIRUB SERPL-MCNC: 0.4 MG/DL (ref 0–1)
BILIRUBIN URINE: NEGATIVE
BLOOD, URINE: NEGATIVE
BUN BLDV-MCNC: 15 MG/DL (ref 7–20)
CALCIUM SERPL-MCNC: 9.4 MG/DL (ref 8.3–10.6)
CHLORIDE BLD-SCNC: 101 MMOL/L (ref 99–110)
CHOLESTEROL, TOTAL: 153 MG/DL (ref 0–199)
CLARITY: CLEAR
CO2: 28 MMOL/L (ref 21–32)
COLOR: YELLOW
CREAT SERPL-MCNC: 1.2 MG/DL (ref 0.8–1.3)
EOSINOPHILS ABSOLUTE: 0.2 K/UL (ref 0–0.6)
EOSINOPHILS RELATIVE PERCENT: 6.2 %
GFR AFRICAN AMERICAN: >60
GFR NON-AFRICAN AMERICAN: 59
GLOBULIN: 2.2 G/DL
GLUCOSE BLD-MCNC: 103 MG/DL (ref 70–99)
GLUCOSE URINE: NEGATIVE MG/DL
HCT VFR BLD CALC: 41.2 % (ref 40.5–52.5)
HDLC SERPL-MCNC: 52 MG/DL (ref 40–60)
HEMOGLOBIN: 13.8 G/DL (ref 13.5–17.5)
KETONES, URINE: NEGATIVE MG/DL
LDL CHOLESTEROL CALCULATED: 74 MG/DL
LEUKOCYTE ESTERASE, URINE: NEGATIVE
LYMPHOCYTES ABSOLUTE: 0.7 K/UL (ref 1–5.1)
LYMPHOCYTES RELATIVE PERCENT: 17 %
MCH RBC QN AUTO: 31.3 PG (ref 26–34)
MCHC RBC AUTO-ENTMCNC: 33.6 G/DL (ref 31–36)
MCV RBC AUTO: 93.2 FL (ref 80–100)
MICROSCOPIC EXAMINATION: NORMAL
MONOCYTES ABSOLUTE: 0.6 K/UL (ref 0–1.3)
MONOCYTES RELATIVE PERCENT: 15.3 %
NEUTROPHILS ABSOLUTE: 2.3 K/UL (ref 1.7–7.7)
NEUTROPHILS RELATIVE PERCENT: 60.5 %
NITRITE, URINE: NEGATIVE
PDW BLD-RTO: 13.3 % (ref 12.4–15.4)
PH UA: 6 (ref 5–8)
PLATELET # BLD: 179 K/UL (ref 135–450)
PMV BLD AUTO: 8.5 FL (ref 5–10.5)
POTASSIUM SERPL-SCNC: 4.2 MMOL/L (ref 3.5–5.1)
PROTEIN UA: NEGATIVE MG/DL
RBC # BLD: 4.42 M/UL (ref 4.2–5.9)
SODIUM BLD-SCNC: 142 MMOL/L (ref 136–145)
SPECIFIC GRAVITY UA: 1.02 (ref 1–1.03)
TOTAL PROTEIN: 6.5 G/DL (ref 6.4–8.2)
TRIGL SERPL-MCNC: 135 MG/DL (ref 0–150)
TSH REFLEX: 1.13 UIU/ML (ref 0.27–4.2)
URINE REFLEX TO CULTURE: NORMAL
URINE TYPE: NORMAL
UROBILINOGEN, URINE: 0.2 E.U./DL
VLDLC SERPL CALC-MCNC: 27 MG/DL
WBC # BLD: 3.8 K/UL (ref 4–11)

## 2020-06-23 ENCOUNTER — OFFICE VISIT (OUTPATIENT)
Dept: INTERNAL MEDICINE CLINIC | Age: 70
End: 2020-06-23
Payer: MEDICARE

## 2020-06-23 VITALS
WEIGHT: 142.6 LBS | HEART RATE: 68 BPM | HEIGHT: 69 IN | SYSTOLIC BLOOD PRESSURE: 110 MMHG | BODY MASS INDEX: 21.12 KG/M2 | DIASTOLIC BLOOD PRESSURE: 76 MMHG | RESPIRATION RATE: 14 BRPM | TEMPERATURE: 98.1 F

## 2020-06-23 PROCEDURE — 99214 OFFICE O/P EST MOD 30 MIN: CPT | Performed by: INTERNAL MEDICINE

## 2020-06-23 PROCEDURE — G0439 PPPS, SUBSEQ VISIT: HCPCS | Performed by: INTERNAL MEDICINE

## 2020-06-23 RX ORDER — MELOXICAM 7.5 MG/1
7.5 TABLET ORAL DAILY
Qty: 30 TABLET | Refills: 3 | Status: SHIPPED | OUTPATIENT
Start: 2020-06-23 | End: 2020-11-09 | Stop reason: SDUPTHER

## 2020-06-23 ASSESSMENT — LIFESTYLE VARIABLES
AUDIT-C TOTAL SCORE: 2
HOW MANY STANDARD DRINKS CONTAINING ALCOHOL DO YOU HAVE ON A TYPICAL DAY: 0
HOW OFTEN DO YOU HAVE A DRINK CONTAINING ALCOHOL: 2
HOW OFTEN DO YOU HAVE SIX OR MORE DRINKS ON ONE OCCASION: 0

## 2020-06-23 ASSESSMENT — PATIENT HEALTH QUESTIONNAIRE - PHQ9
SUM OF ALL RESPONSES TO PHQ QUESTIONS 1-9: 1
SUM OF ALL RESPONSES TO PHQ QUESTIONS 1-9: 1

## 2020-06-23 ASSESSMENT — ENCOUNTER SYMPTOMS
GASTROINTESTINAL NEGATIVE: 1
ALLERGIC/IMMUNOLOGIC NEGATIVE: 1
BACK PAIN: 1
RESPIRATORY NEGATIVE: 1
EYES NEGATIVE: 1

## 2020-06-23 NOTE — PROGRESS NOTES
Normocephalic and atraumatic. Right Ear: External ear normal.      Left Ear: External ear normal.      Nose: Nose normal.   Eyes:      Conjunctiva/sclera: Conjunctivae normal.      Pupils: Pupils are equal, round, and reactive to light. Neck:      Musculoskeletal: Normal range of motion and neck supple. Thyroid: No thyromegaly. Trachea: No tracheal deviation. Cardiovascular:      Rate and Rhythm: Normal rate and regular rhythm. Heart sounds: Normal heart sounds. Pulmonary:      Effort: Pulmonary effort is normal. No respiratory distress. Breath sounds: Normal breath sounds. No wheezing. Abdominal:      General: Abdomen is flat. Bowel sounds are normal.      Palpations: Abdomen is soft. Genitourinary:     Comments: As per Dr Bernardo Pelayo   Musculoskeletal: Normal range of motion. General: Tenderness (low back pain L pelvic brim region ) present. Skin:     General: Skin is warm and dry. Neurological:      General: No focal deficit present. Mental Status: He is alert and oriented to person, place, and time. Mental status is at baseline. Deep Tendon Reflexes: Reflexes are normal and symmetric. Psychiatric:         Mood and Affect: Mood normal.         Behavior: Behavior normal.         Thought Content: Thought content normal.         /76 (Site: Right Upper Arm, Position: Sitting, Cuff Size: Medium Adult)   Pulse 68   Temp 98.1 °F (36.7 °C) (Oral)   Resp 14   Ht 5' 9\" (1.753 m)   Wt 142 lb 9.6 oz (64.7 kg)   BMI 21.06 kg/m²       Assessment & Plan:         Allergic sinusitis  Prn Tx with Flonase,Claritin and Mucinex DM     Anxiety  Stable no current meds     Cancer (HCC)  Cont F/U with Dr Bernardo Pelayo to repeat PSA then also seeing Dr Alis Shipley      Chronic fatigue  Stable for now     Low back strain  On and off c/o as noted worse 2nd to Rad Tx  ?  In the L pelvic brim region Tx with Mobic     Migraine  Prn meds as noted     Prostate cancer ([address]  Cont F/U with Dr Bernardo Pelayo Chronic fatigue    Cancer (HCC)    Low back strain    Allergic sinusitis    Dupuytren's contracture of right hand    Dupuytren's contracture    Periumbilical abdominal pain    Vertigo    Anxiety    Well adult exam    Sciatica of left side    Pain in thumb joint with movement, left    External hemorrhoid       Mood Disorders Screen:  Risk factors: none  Symptoms:  endorses none, denies depressed mood, difficulty concentrating, difficulty sleeping and changes in appetite     Safety Assessment:  Functional ability/ADLs:  Difficulty with bathing- no, grooming- no, meals- no, incontinence- no.  Driving- yes. Home safety: Lives alone. Number of stairs to enter home: 2, within home: 12 between floors. Risk factors for falls: non. Home environment modifications:  yes - rails in BR. End of Life Planning:  Advanced Directive: Power of  for healthcare on file.       Preventive Care:  Self-testicular exams: Yes  Last PSA: 14, abnormal  Last colonoscopy: 2018, abnormal  AAA screening:  no   Last eye exam: 2020,glucoma  Exercise: yes  Seatbelt use: yes      Immunization History   Administered Date(s) Administered    Influenza, High Dose (Fluzone 65 yrs and older) 09/20/2014, 10/27/2015, 10/14/2016, 10/20/2017, 09/08/2018, 10/21/2019    Pneumococcal Conjugate 13-valent (Snwbhsh10) 07/22/2015    Pneumococcal Polysaccharide (Idinwyekc01) 02/18/2013    Tdap (Boostrix, Adacel) 09/06/2008, 10/15/2018    Zoster Live (Zostavax) 12/30/2015       Past Medical History:   Diagnosis Date    Cancer [facility] 2014    prostate Dr Bernardo Pelayo s/p rads with cyber knife     Dupuytrens contracture     Foot pain, left     Migraine     Mole (skin)     Nocturia     Reflux esophagitis     Shoulder pain      Past Surgical History:   Procedure Laterality Date    COLONOSCOPY  2000 & 2013    Dr Willian Guy repeat 2018    COLONOSCOPY  02/08/2018    Dr. Amanda Spear polyp removed0- repeat 2022   [address]    detached drinks containing alcohol do you have on a typical day when drinking?: One or two  How often do you have six or more drinks on one occasion?: Never  Audit-C Score: 2  Substance Abuse Interventions:  · NA    General Health:  General  In general, how would you say your health is?: Fair  In the past 7 days, have you experienced any of the following? New or Increased Pain, New or Increased Fatigue, Loneliness, Social Isolation, Stress or Anger?: (!) New or Increased Pain  Do you get the social and emotional support that you need?: Yes  Do you have a Living Will?: (!) No  General Health Risk Interventions:  · NA    Health Habits/Nutrition:  Health Habits/Nutrition  Do you exercise for at least 20 minutes 2-3 times per week?: Yes  Have you lost any weight without trying in the past 3 months?: (!) Yes  Do you eat fewer than 2 meals per day?: No  Have you seen a dentist within the past year?: Yes  Body mass index is 21.06 kg/m².   Health Habits/Nutrition Interventions:  · NA    Safety:  Safety  Do you have working smoke detectors?: Yes  Have all throw rugs been removed or fastened?: (!) No  Do you have non-slip mats or surfaces in all bathtubs/showers?: (!) No  Do all of your stairways have a railing or banister?: Yes  Are your doorways, halls and stairs free of clutter?: Yes  Do you always fasten your seatbelt when you are in a car?: Yes  Safety Interventions:  · Home safety tips provided    Personalized Preventive Plan   Current Health Maintenance Status  Immunization History   Administered Date(s) Administered    Influenza, High Dose (Fluzone 65 yrs and older) 09/20/2014, 10/27/2015, 10/14/2016, 10/20/2017, 09/08/2018, 10/21/2019    Pneumococcal Conjugate 13-valent ([id number]) 07/22/2015    Pneumococcal Polysaccharide (Uycljnatd71) 02/18/2013    Tdap (Boostrix, Adacel) 09/06/2008, 10/15/2018    Zoster Live (Zostavax) 12/30/2015        Health Maintenance   Topic Date Due    Shingles Vaccine (2 of 3) 02/24/2016    Annual Wellness Visit (AWV)  2019    PSA counseling  10/11/2019    Colon cancer screen colonoscopy  2023    Lipid screen  2025    DTaP/Tdap/Td vaccine (3 - Td) 10/15/2028    Flu vaccine  Completed    Pneumococcal 65+ years Vaccine  Completed    AAA screen  Completed    Hepatitis C screen  Completed    Hepatitis A vaccine  Aged Out    Hepatitis B vaccine  Aged Out    Hib vaccine  Aged Out    Meningococcal (ACWY) vaccine  Aged Out     Recommendations for Roomish Due: see orders and patient instructions/AVS.  . Recommended screening schedule for the next 5-10 years is provided to the patient in written form: see Patient Instructions/AVS.    Aliza Yang was seen today for medicare awv and discuss labs. Diagnoses and all orders for this visit:    Allergic sinusitis    Anxiety    Cancer ([facility] [address]    Chronic fatigue    Strain of lumbar region, subsequent encounter    Chronic migraine without aura without status migrainosus, not intractable    Prostate cancer ([facility] [address]    Reflux esophagitis    Vertigo    Well adult exam    Other orders  -     meloxicam (MOBIC) 7.5 MG tablet; Take 1 tablet by mouth daily                  Medicare Annual Wellness Visit  Name: Bob Sanchez Date: 2020   MRN: [id number] Sex: Male   Age: 65 y.o. Ethnicity: Non-/Non    : 1950 Race: Romaine Nick is here for Medicare AWV and Discuss Labs    Screenings for behavioral, psychosocial and functional/safety risks, and cognitive dysfunction are all negative except as indicated below. These results, as well as other patient data from the [address] form, are documented in Flowsheets linked to this Encounter. No Known Allergies    Prior to Visit Medications    Medication Sig Taking?  Authorizing Provider   meloxicam (MOBIC) 7.5 MG tablet Take 1 tablet by mouth daily Yes Brian Dunn MD   latanoprost (XALATAN) 0.005 % ophthalmic solution Place 1 drop into Interventions:     Substance Abuse:  Social History     Tobacco History     Smoking Status  Former Smoker Quit date  1/1/1980 Smoking Frequency  1 pack/day for 10 years (10 pk yrs)    Smokeless Tobacco Use  Never Used          Alcohol History     Alcohol Use Status  Yes Drinks/Week  0 Standard drinks or equivalent per week Amount  0.0 standard drinks of alcohol/wk Comment  occ          Drug Use     Drug Use Status  Yes          Sexual Activity     Sexually Active  Not Asked               Audit Questionnaire: Screen for Alcohol Misuse  How often do you have a drink containing alcohol?: Two to four times a month  How many standard drinks containing alcohol do you have on a typical day when drinking?: One or two  How often do you have six or more drinks on one occasion?: Never  Audit-C Score: 2  Substance Abuse Interventions:  ·     General Health:  General  In general, how would you say your health is?: Fair  In the past 7 days, have you experienced any of the following? New or Increased Pain, New or Increased Fatigue, Loneliness, Social Isolation, Stress or Anger?: (!) New or Increased Pain  Do you get the social and emotional support that you need?: Yes  Do you have a Living Will?: (!) No  General Health Risk Interventions:  ·     Health Habits/Nutrition:  Health Habits/Nutrition  Do you exercise for at least 20 minutes 2-3 times per week?: Yes  Have you lost any weight without trying in the past 3 months?: (!) Yes  Do you eat fewer than 2 meals per day?: No  Have you seen a dentist within the past year?: Yes  Body mass index is 21.06 kg/m².   Health Habits/Nutrition Interventions:  ·     Safety:  Safety  Do you have working smoke detectors?: Yes  Have all throw rugs been removed or fastened?: (!) No  Do you have non-slip mats or surfaces in all bathtubs/showers?: (!) No  Do all of your stairways have a railing or banister?: Yes  Are your doorways, halls and stairs free of clutter?: Yes  Do you always fasten your seatbelt when you are in a car?: Yes  Safety Interventions:  ·     Personalized Preventive Plan   Current Health Maintenance Status  Immunization History   Administered Date(s) Administered    Influenza, High Dose (Fluzone 65 yrs and older) 09/20/2014, 10/27/2015, 10/14/2016, 10/20/2017, 09/08/2018, 10/21/2019    Pneumococcal Conjugate 13-valent (Nidvkvk96) 07/22/2015    Pneumococcal Polysaccharide (Dpvhwjddg05) 02/18/2013    Tdap (Boostrix, Adacel) 09/06/2008, 10/15/2018    Zoster Live (Zostavax) 12/30/2015        Health Maintenance   Topic Date Due    Shingles Vaccine (2 of 3) 02/24/2016    Annual Wellness Visit (AWV)  05/29/2019    PSA counseling  10/11/2019    Colon cancer screen colonoscopy  02/08/2023    Lipid screen  06/22/2025    DTaP/Tdap/Td vaccine (3 - Td) 10/15/2028    Flu vaccine  Completed    Pneumococcal 65+ years Vaccine  Completed    AAA screen  Completed    Hepatitis C screen  Completed    Hepatitis A vaccine  Aged Out    Hepatitis B vaccine  Aged Out    Hib vaccine  Aged Out    Meningococcal (ACWY) vaccine  Aged Out     Recommendations for [place] Due: see orders and patient instructions/AVS.  . Recommended screening schedule for the next 5-10 years is provided to the patient in written form: see Patient Instructions/AVS.    Myra Serna was seen today for medicare awv and discuss labs. Diagnoses and all orders for this visit:    Well adult exam    Allergic sinusitis    Anxiety    Cancer ([facility] [address]    Chronic fatigue    Strain of lumbar region, subsequent encounter    Chronic migraine without aura without status migrainosus, not intractable    Prostate cancer ([address]    Reflux esophagitis    Vertigo    Routine general medical examination at a health care facility    Other orders  -     meloxicam (MOBIC) 7.5 MG tablet;  Take 1 tablet by mouth daily              Brettdesirae Dominguez is a 65 y.o. male being evaluated by a Virtual Visit (video and audio) encounter to address concerns as mentioned above. A caregiver was present when appropriate. Due to this being a TeleHealth encounter (During PSWP-68 public health emergency), evaluation of the following organ systems was limited: Vitals/Constitutional/EENT/Resp/CV/GI//MS/Neuro/Skin/Heme-Lymph-Imm. Pursuant to the emergency declaration under the [address] [place] [address], [address] [place] [address] and the [employer] Act, this Virtual Visit was conducted with patient's (and/or legal guardian's) consent, to reduce the patient's risk of exposure to COVID-19 and provide necessary medical care. The patient (and/or legal guardian) has also been advised to contact this office for worsening conditions or problems, and seek emergency medical treatment and/or call 911 if deemed necessary. Patient identification was verified at the start of the visit:     Services were provided through a video synchronous discussion virtually to substitute for in-person clinic visit. Patient and provider were located at their individual homes. --Isabela Bravo MD on 6/23/2020 at 11:15 AM    An electronic signature was used to authenticate this note.

## 2020-07-02 ENCOUNTER — TELEPHONE (OUTPATIENT)
Dept: INTERNAL MEDICINE CLINIC | Age: 70
End: 2020-07-02

## 2020-07-23 PROBLEM — Z00.00 WELL ADULT EXAM: Status: RESOLVED | Noted: 2018-12-24 | Resolved: 2020-07-23

## 2020-11-06 ENCOUNTER — TELEPHONE (OUTPATIENT)
Dept: INTERNAL MEDICINE CLINIC | Age: 70
End: 2020-11-06

## 2020-11-06 RX ORDER — METHYLPREDNISOLONE 4 MG/1
TABLET ORAL
Qty: 21 TABLET | Refills: 0 | Status: SHIPPED | OUTPATIENT
Start: 2020-11-06 | End: 2020-11-12

## 2020-11-06 NOTE — TELEPHONE ENCOUNTER
? Could be sciatica since it started with back - order medrol dose pac and cont Meloxicam order more of it if he needs- it

## 2020-11-06 NOTE — TELEPHONE ENCOUNTER
Patient states his left knee has been hurting him since last night. Patient states the pain started in his back and moved down to his left knee while he was doing some yard work. Patient would like to know what he can do or he can take. Patient states he had some meloxicam (MOBIC) 7.5 MG tablet left that he tried to take for his knee what the medication did not help. Patient has an appointment on Monday. [facility] [address] [facility], [address] [facility]       Please advise.

## 2020-11-09 ENCOUNTER — OFFICE VISIT (OUTPATIENT)
Dept: INTERNAL MEDICINE CLINIC | Age: 70
End: 2020-11-09
Payer: MEDICARE

## 2020-11-09 VITALS
HEIGHT: 69 IN | TEMPERATURE: 98.7 F | RESPIRATION RATE: 16 BRPM | BODY MASS INDEX: 22.01 KG/M2 | WEIGHT: 148.6 LBS | HEART RATE: 68 BPM | DIASTOLIC BLOOD PRESSURE: 70 MMHG | SYSTOLIC BLOOD PRESSURE: 124 MMHG

## 2020-11-09 PROBLEM — M54.32 SCIATICA OF LEFT SIDE: Status: RESOLVED | Noted: 2018-12-24 | Resolved: 2020-11-09

## 2020-11-09 PROCEDURE — 99214 OFFICE O/P EST MOD 30 MIN: CPT | Performed by: INTERNAL MEDICINE

## 2020-11-09 RX ORDER — MELOXICAM 7.5 MG/1
7.5 TABLET ORAL DAILY
Qty: 90 TABLET | Refills: 3 | Status: SHIPPED
Start: 2020-11-09 | End: 2021-03-23 | Stop reason: DRUGHIGH

## 2020-11-09 ASSESSMENT — ENCOUNTER SYMPTOMS
ALLERGIC/IMMUNOLOGIC NEGATIVE: 1
GASTROINTESTINAL NEGATIVE: 1
RESPIRATORY NEGATIVE: 1
BACK PAIN: 1
EYES NEGATIVE: 1

## 2020-11-09 NOTE — PATIENT INSTRUCTIONS
Patient Education        Sciatica: Care Instructions  Your Care Instructions     Sciatica (say \"bng-JW-fu-kuh\") is an irritation of one of the sciatic nerves, which come from the spinal cord in the lower back. The sciatic nerves and their branches extend down through the buttock to the foot. Sciatica can develop when an injured disc in the back irritates or presses against a spinal nerve root. Its main symptom is pain, numbness, or weakness that is often worse in the leg or foot than in the back. Sciatica often will improve and go away with time. Early treatment usually includes medicines and exercises to relieve pain. Follow-up care is a key part of your treatment and safety. Be sure to make and go to all appointments, and call your doctor if you are having problems. It's also a good idea to know your test results and keep a list of the medicines you take. How can you care for yourself at home? · Take pain medicines exactly as directed. ? If the doctor gave you a prescription medicine for pain, take it as prescribed. ? If you are not taking a prescription pain medicine, ask your doctor if you can take an over-the-counter medicine. · Use heat or ice to relieve pain. ? To apply heat, put a warm water bottle, heating pad set on low, or warm cloth on your back. Do not go to sleep with a heating pad on your skin. ? To use ice, put ice or a cold pack on the area for 10 to 20 minutes at a time. Put a thin cloth between the ice and your skin. · Avoid sitting if possible, unless it feels better than standing. · Alternate lying down with short walks. Increase your walking distance as you are able to without making your symptoms worse. · Do not do anything that makes your symptoms worse. When should you call for help? Call 911 anytime you think you may need emergency care. For example, call if:    · You are unable to move a leg at all.    Call your doctor now or seek immediate medical care if:    · You have new or worse symptoms in your legs or buttocks. Symptoms may include:  ? Numbness or tingling. ? Weakness. ? Pain.     · You lose bladder or bowel control. Watch closely for changes in your health, and be sure to contact your doctor if:    · You are not getting better as expected. Where can you learn more? Go to https://chelisabeth.[email or web address] org and sign in to your [employer] account. Enter [phone] in the [facility] box to learn more about \"Sciatica: Care Instructions. \"     If you do not have an account, please click on the \"Sign Up Now\" link. Current as of: March 2, 2020               Content Version: 12.6  © 2006-2020 [employer]?. Care instructions adapted under license by [facility] If you have questions about a medical condition or this instruction, always ask your healthcare professional. [address] any warranty or liability for your use of this information. Patient Education        Sciatica: Exercises  Introduction  Here are some examples of typical rehabilitation exercises for your condition. Start each exercise slowly. Ease off the exercise if you start to have pain. Your doctor or physical therapist will tell you when you can start these exercises and which ones will work best for you. When you are not being active, find a comfortable position for rest. Some people are comfortable on the floor or a medium-firm bed with a small pillow under their head and another under their knees. Some people prefer to lie on their side with a pillow between their knees. Don't stay in one position for too long. Take short walks (10 to 20 minutes) every 2 to 3 hours. Avoid slopes, hills, and stairs until you feel better. Walk only distances you can manage without pain, especially leg pain. How to do the exercises  Back stretches   1. Get down on your hands and knees on the floor. 2. Relax your head and allow it to droop.  Round your back up toward the ceiling until you feel a nice stretch in your upper, middle, and lower back. Hold this stretch for as long as it feels comfortable, or about 15 to 30 seconds. 3. Return to the starting position with a flat back while you are on your hands and knees. 4. Let your back sway by pressing your stomach toward the floor. Lift your buttocks toward the ceiling. 5. Hold this position for 15 to 30 seconds. 6. Repeat 2 to 4 times. Follow-up care is a key part of your treatment and safety. Be sure to make and go to all appointments, and call your doctor if you are having problems. It's also a good idea to know your test results and keep a list of the medicines you take. Where can you learn more? Go to https://Buy.On.Social.[employer]. org and sign in to your [employer] account. Enter P860 in the [employer] box to learn more about \"Sciatica: Exercises. \"     If you do not have an account, please click on the \"Sign Up Now\" link. Current as of: March 2, 2020               Content Version: 12.6  © 8308-8980 [employer]. Care instructions adapted under license by [facility] ([facility]). If you have questions about a medical condition or this instruction, always ask your healthcare professional. [address] any warranty or liability for your use of this information.

## 2020-11-09 NOTE — PROGRESS NOTES
Subjective:      Patient ID: Gisela Montes is a 65 y.o. male. HPI  Here today for follow up of chronic problems as per HPI and as problems listed under assessment and plan,no new c/o feels good       No Known Allergies    Current Outpatient Medications   Medication Sig Dispense Refill    methylPREDNISolone (MEDROL DOSEPACK) 4 MG tablet Use as directed 21 tablet 0    meloxicam (MOBIC) 7.5 MG tablet Take 1 tablet by mouth daily 30 tablet 3    latanoprost (XALATAN) 0.005 % ophthalmic solution Place 1 drop into the right eye nightly      omeprazole (PRILOSEC) 20 MG capsule TAKE 1 CAPSULE DAILY 90 capsule 0     No current facility-administered medications for this visit.         Past Medical History:   Diagnosis Date    Cancer [facility]) 2014    prostate Dr Dany Burrell s/p rads with cyber knife     Dupuytrens contracture     Foot pain, left     Migraine     Mole (skin)     Nocturia     Reflux esophagitis     Shoulder pain        Family History   Problem Relation Age of Onset    Cancer Mother     Stroke Brother        Past Surgical History:   Procedure Laterality Date    COLONOSCOPY  2000 & 2013    Dr Bettie Alexander repeat 2018    COLONOSCOPY  02/08/2018    Dr. Jung Portillo polyp removed0- repeat 2022   [address]    detached retina bilat     FINGER TRIGGER RELEASE Left 8/13/13    middle finger    PROSTATE SURGERY  10/2013    mult Bx  1 +Bx -s/p cyber knife 9/2014       Social History     Socioeconomic History    Marital status: Single     Spouse name: Not on file    Number of children: Not on file    Years of education: Not on file    Highest education level: Not on file   Occupational History    Occupation:    Social Needs    Financial resource strain: Not on file    Food insecurity     Worry: Not on file     Inability: Not on file    Transportation needs     Medical: Not on file     Non-medical: Not on file   Tobacco Use    Smoking status: Former Smoker     Packs/day: 1.00 Years: 10.00     Pack years: 10.00     Last attempt to quit: 1980     Years since quittin.8    Smokeless tobacco: Never Used   Substance and Sexual Activity    Alcohol use: Yes     Alcohol/week: 0.0 standard drinks     Comment: occ    Drug use: Yes    Sexual activity: Not on file   Lifestyle    Physical activity     Days per week: Not on file     Minutes per session: Not on file    Stress: Not on file   Relationships    Social connections     Talks on phone: Not on file     Gets together: Not on file     Attends [employer] service: Not on file     Active member of club or organization: Not on file     Attends meetings of clubs or organizations: Not on file     Relationship status: Not on file    Intimate partner violence     Fear of current or ex partner: Not on file     Emotionally abused: Not on file     Physically abused: Not on file     Forced sexual activity: Not on file   Other Topics Concern    Not on file   Social History Narrative    Not on file       Review of Systems  Review of Systems   Constitutional: Positive for unexpected weight change (down 9#). HENT: Negative. Eyes: Negative. S/P cataracts   Respiratory: Negative. Cardiovascular: Negative. Gastrointestinal: Negative. Occ GERD   Repeat colonoscopy  polyp-- repeat    Endocrine: Negative. Genitourinary: Positive for frequency and urgency. Occ nocturia-1 + BX for prostate CA  S/p cyber knife 2014 now PSA decreased  as noted cont with Dr Stefano Oconnell now PSA has gone up to 5   F/U as noted  Had a PET scan that was negative - my need Bx in future as per Dr Stefano Oconnell    Musculoskeletal: Positive for arthralgias (occ L shoulder ) and back pain (chronic in nature ? related to prostae ). Skin: Negative. Allergic/Immunologic: Negative. Neurological: Positive for headaches. Hematological: Negative. Psychiatric/Behavioral: Negative.         Objective:   Physical Exam:  Physical Exam  Constitutional:       Appearance: He is well-developed. HENT:      Head: Normocephalic and atraumatic. Right Ear: External ear normal.      Left Ear: External ear normal.   Eyes:      Conjunctiva/sclera: Conjunctivae normal.      Pupils: Pupils are equal, round, and reactive to light. Neck:      Musculoskeletal: Normal range of motion and neck supple. Thyroid: No thyromegaly. Trachea: No tracheal deviation. Cardiovascular:      Rate and Rhythm: Normal rate and regular rhythm. Pulses: Normal pulses. Heart sounds: Normal heart sounds. Pulmonary:      Effort: Pulmonary effort is normal. No respiratory distress. Breath sounds: Normal breath sounds. No wheezing. Abdominal:      General: Abdomen is flat. Bowel sounds are normal.      Palpations: Abdomen is soft. Musculoskeletal: Normal range of motion. General: Tenderness (low back pain L pelvic brim region  now L sciatic point tenderness ) present. Skin:     General: Skin is warm and dry. Neurological:      General: No focal deficit present. Mental Status: He is alert and oriented to person, place, and time. Mental status is at baseline. Deep Tendon Reflexes: Reflexes are normal and symmetric. Psychiatric:         Mood and Affect: Mood normal.         Behavior: Behavior normal.         Thought Content:  Thought content normal.         /70 (Site: Right Upper Arm, Position: Sitting, Cuff Size: Medium Adult)   Pulse 68   Temp 98.7 °F (37.1 °C) (Oral)   Resp 16   Ht 5' 9\" (1.753 m)   Wt 148 lb 9.6 oz (67.4 kg)   BMI 21.94 kg/m²       Assessment & Plan:         Allergic sinusitis  Prn Tx with Flonase,Claritin and Mucinex DM       Anxiety  Stable no current meds     Cancer (HCC)  Cont F/U with Dr Kacie Weeks back strain  On and off c/o cont prn meds and ROM exercises worse with current L sciatica cont meds as noted     Migraine  Stable occ symptoms prn meds     Prostate cancer (HCC)  Cont F/U as noted last PSA 10/20 was 9.4    Reflux esophagitis  Controled with diet and prn meds     Sciatica of left side  Similar to symptoms of last year MRI as noted now again will Tx with ROM exercises and medrol and mobic consider MRI again

## 2020-11-09 NOTE — ASSESSMENT & PLAN NOTE
Similar to symptoms of last year MRI as noted now again will Tx with ROM exercises and medrol and mobic consider MRI again

## 2021-03-23 ENCOUNTER — OFFICE VISIT (OUTPATIENT)
Dept: INTERNAL MEDICINE CLINIC | Age: 71
End: 2021-03-23
Payer: MEDICARE

## 2021-03-23 VITALS
DIASTOLIC BLOOD PRESSURE: 68 MMHG | SYSTOLIC BLOOD PRESSURE: 122 MMHG | HEIGHT: 69 IN | RESPIRATION RATE: 12 BRPM | BODY MASS INDEX: 22.87 KG/M2 | TEMPERATURE: 98.4 F | WEIGHT: 154.4 LBS | HEART RATE: 68 BPM

## 2021-03-23 DIAGNOSIS — K21.00 GASTROESOPHAGEAL REFLUX DISEASE WITH ESOPHAGITIS WITHOUT HEMORRHAGE: ICD-10-CM

## 2021-03-23 DIAGNOSIS — C61 PROSTATE CANCER (HCC): ICD-10-CM

## 2021-03-23 DIAGNOSIS — G43.709 CHRONIC MIGRAINE WITHOUT AURA WITHOUT STATUS MIGRAINOSUS, NOT INTRACTABLE: ICD-10-CM

## 2021-03-23 DIAGNOSIS — M62.838 TRAPEZIUS MUSCLE SPASM: Primary | ICD-10-CM

## 2021-03-23 PROCEDURE — 99214 OFFICE O/P EST MOD 30 MIN: CPT | Performed by: INTERNAL MEDICINE

## 2021-03-23 RX ORDER — MELOXICAM 7.5 MG/1
7.5 TABLET ORAL PRN
COMMUNITY
End: 2021-11-05

## 2021-03-23 ASSESSMENT — ENCOUNTER SYMPTOMS
BACK PAIN: 1
GASTROINTESTINAL NEGATIVE: 1
EYES NEGATIVE: 1
RESPIRATORY NEGATIVE: 1
ALLERGIC/IMMUNOLOGIC NEGATIVE: 1

## 2021-03-23 ASSESSMENT — PATIENT HEALTH QUESTIONNAIRE - PHQ9
SUM OF ALL RESPONSES TO PHQ QUESTIONS 1-9: 0
SUM OF ALL RESPONSES TO PHQ QUESTIONS 1-9: 0
SUM OF ALL RESPONSES TO PHQ9 QUESTIONS 1 & 2: 0
SUM OF ALL RESPONSES TO PHQ QUESTIONS 1-9: 0
2. FEELING DOWN, DEPRESSED OR HOPELESS: 0
1. LITTLE INTEREST OR PLEASURE IN DOING THINGS: 0

## 2021-03-23 NOTE — PROGRESS NOTES
Spouse name: Not on file    Number of children: Not on file    Years of education: Not on file    Highest education level: Not on file   Occupational History    Occupation:    Social Needs    Financial resource strain: Not on file    Food insecurity     Worry: Not on file     Inability: Not on file    Transportation needs     Medical: Not on file     Non-medical: Not on file   Tobacco Use    Smoking status: Former Smoker     Packs/day: 1.00     Years: 10.00     Pack years: 10.00     Quit date: 1980     Years since quittin.2    Smokeless tobacco: Never Used   Substance and Sexual Activity    Alcohol use: Yes     Alcohol/week: 0.0 standard drinks     Comment: occ    Drug use: Yes    Sexual activity: Not on file   Lifestyle    Physical activity     Days per week: Not on file     Minutes per session: Not on file    Stress: Not on file   Relationships    Social connections     Talks on phone: Not on file     Gets together: Not on file     Attends Baptism service: Not on file     Active member of club or organization: Not on file     Attends meetings of clubs or organizations: Not on file     Relationship status: Not on file    Intimate partner violence     Fear of current or ex partner: Not on file     Emotionally abused: Not on file     Physically abused: Not on file     Forced sexual activity: Not on file   Other Topics Concern    Not on file   Social History Narrative    Not on file       Review of Systems  Review of Systems   Constitutional: Positive for unexpected weight change (down 9#). HENT: Negative. Eyes: Negative. S/P cataracts   Respiratory: Negative. Cardiovascular: Negative. Gastrointestinal: Negative. Occ GERD   Repeat colonoscopy  polyp-- repeat    Endocrine: Negative. Genitourinary: Positive for frequency and urgency.         Occ nocturia-1 + BX for prostate CA  S/p cyber knife 2014 now PSA decreased  as noted cont with Dr Jenna Chavez now PSA has gone up to 5   F/U as noted  Had a PET scan that was negative 12/18- my need Bx in future as per Dr Jenna Chavez    Musculoskeletal: Positive for arthralgias (occ L shoulder ), back pain (chronic in nature ? related to prostae ) and neck pain. Skin: Negative. Allergic/Immunologic: Negative. Neurological: Negative for numbness and headaches. Hematological: Negative. Psychiatric/Behavioral: Negative. Objective:   Physical Exam:  Physical Exam  Constitutional:       Appearance: He is well-developed. HENT:      Head: Normocephalic and atraumatic. Right Ear: External ear normal.      Left Ear: External ear normal.   Eyes:      Conjunctiva/sclera: Conjunctivae normal.      Pupils: Pupils are equal, round, and reactive to light. Neck:      Musculoskeletal: Normal range of motion and neck supple. Thyroid: No thyromegaly. Trachea: No tracheal deviation. Cardiovascular:      Rate and Rhythm: Normal rate and regular rhythm. Pulses: Normal pulses. Heart sounds: Normal heart sounds. Pulmonary:      Effort: Pulmonary effort is normal. No respiratory distress. Breath sounds: Normal breath sounds. No wheezing. Musculoskeletal: Normal range of motion. General: Tenderness (R Trapezius muscle pain in occipital region ) present. Skin:     General: Skin is warm and dry. Neurological:      General: No focal deficit present. Mental Status: He is alert and oriented to person, place, and time. Mental status is at baseline. Deep Tendon Reflexes: Reflexes are normal and symmetric. Psychiatric:         Mood and Affect: Mood normal.         Behavior: Behavior normal.         Thought Content:  Thought content normal.         /68 (Site: Right Upper Arm, Position: Sitting, Cuff Size: Medium Adult)   Pulse 68   Temp 98.4 °F (36.9 °C) (Temporal)   Resp 12   Ht 5' 9\" (1.753 m)   Wt 154 lb 6.4 oz (70 kg)   BMI 22.80 kg/m² Assessment & Plan:         Prostate cancer ([facility] [address]  Cont F/U with Dr Thee Bauer and Gage Mcdonald my need hormone Tx     Migraine  occ symptoms     Reflux esophagitis  occ symptoms cont diet and prn meds     Trapezius muscle spasm  R sided  Tx with ROM exercises and Meloxicam

## 2021-04-17 ENCOUNTER — NURSE TRIAGE (OUTPATIENT)
Dept: OTHER | Facility: CLINIC | Age: 71
End: 2021-04-17

## 2021-04-17 NOTE — TELEPHONE ENCOUNTER
Reason for Disposition   COVID-19 Testing, questions about    Answer Assessment - Initial Assessment Questions  1. COVID-19 DIAGNOSIS: \"Who made your Coronavirus (COVID-19) diagnosis? \" \"Was it confirmed by a positive lab test?\" If not diagnosed by a HCP, ask \"Are there lots of cases (community spread) where you live? \" (See public health department website, if unsure)      None    2. COVID-19 EXPOSURE: \"Was there any known exposure to COVID before the symptoms began? \" CDC Definition of close contact: within 6 feet (2 meters) for a total of 15 minutes or more over a 24-hour period. No    3. ONSET: \"When did the COVID-19 symptoms start? \"       3 days    4. WORST SYMPTOM: \"What is your worst symptom? \" (e.g., cough, fever, shortness of breath, muscle aches)      Fatigue and sore throat/scratchy throat    5. COUGH: \"Do you have a cough? \" If so, ask: \"How bad is the cough? \"        No    6. FEVER: \"Do you have a fever? \" If so, ask: \"What is your temperature, how was it measured, and when did it start? \"      No    7. RESPIRATORY STATUS: \"Describe your breathing? \" (e.g., shortness of breath, wheezing, unable to speak)       No problems    8. BETTER-SAME-WORSE: Jackelyn Mail you getting better, staying the same or getting worse compared to yesterday? \"  If getting worse, ask, \"In what way? \"      Same or better    9. HIGH RISK DISEASE: \"Do you have any chronic medical problems? \" (e.g., asthma, heart or lung disease, weak immune system, obesity, etc.)      No    10. PREGNANCY: \"Is there any chance you are pregnant? \" \"When was your last menstrual period? \"        N/a    11. OTHER SYMPTOMS: \"Do you have any other symptoms? \"  (e.g., chills, fatigue, headache, loss of smell or taste, muscle pain, sore throat; new loss of smell or taste especially support the diagnosis of COVID-19)        Fatigue    Protocols used: CORONAVIRUS (COVID-19) [facility]    Call received on [facility] [address]      Brief description of triage: suspects covid but could be allergies, sore throat and fatigue. Has had both Moderna vaccines and it has been 1  Month since he had the second dose. Triage indicates for patient to follow home care guidelines, will consider a test if he worsens, but has hx of seasonal allergies and will start on antihistamine first. Has been hiking and cutting grass, etc.    Care advice provided. Recommended using local department of health website for testing locations and up to date information. Caller verbalizes understanding, denies any other questions or concerns; instructed to call back for any new or worsening symptoms. https://coronavirus. [place].gov/wps/portal/gov/covid-19/dashboards/other-resources/vofxwvq-bn-jpqrkwr

## 2021-06-25 ENCOUNTER — TELEPHONE (OUTPATIENT)
Dept: INTERNAL MEDICINE CLINIC | Age: 71
End: 2021-06-25

## 2021-06-25 DIAGNOSIS — G43.709 CHRONIC MIGRAINE WITHOUT AURA WITHOUT STATUS MIGRAINOSUS, NOT INTRACTABLE: ICD-10-CM

## 2021-06-25 DIAGNOSIS — M62.838 TRAPEZIUS MUSCLE SPASM: ICD-10-CM

## 2021-06-25 DIAGNOSIS — C61 PROSTATE CANCER (HCC): ICD-10-CM

## 2021-06-25 DIAGNOSIS — K21.00 GASTROESOPHAGEAL REFLUX DISEASE WITH ESOPHAGITIS WITHOUT HEMORRHAGE: ICD-10-CM

## 2021-06-25 LAB
BILIRUBIN URINE: NEGATIVE
BLOOD, URINE: NEGATIVE
CLARITY: CLEAR
COLOR: YELLOW
GLUCOSE URINE: NEGATIVE MG/DL
KETONES, URINE: NEGATIVE MG/DL
LEUKOCYTE ESTERASE, URINE: NEGATIVE
MICROSCOPIC EXAMINATION: NORMAL
NITRITE, URINE: NEGATIVE
PH UA: 6 (ref 5–8)
PROTEIN UA: NEGATIVE MG/DL
SPECIFIC GRAVITY UA: 1.03 (ref 1–1.03)
URINE REFLEX TO CULTURE: NORMAL
URINE TYPE: NORMAL
UROBILINOGEN, URINE: 0.2 E.U./DL

## 2021-06-25 NOTE — TELEPHONE ENCOUNTER
----- Message from Tank Zapata sent at 6/25/2021 11:16 AM EDT -----  Subject: Message to Provider    QUESTIONS  Information for Provider? Patient was having questions about the lipid   test and if they're able to drink water  ---------------------------------------------------------------------------  --------------  CALL BACK INFO  What is the best way for the office to contact you? OK to leave message on   voicemail  Preferred Call Back Phone Number? [phone]  ---------------------------------------------------------------------------  --------------  SCRIPT ANSWERS  Relationship to Patient?  Self

## 2021-06-26 LAB
A/G RATIO: 1.9 (ref 1.1–2.2)
ALBUMIN SERPL-MCNC: 4.5 G/DL (ref 3.4–5)
ALP BLD-CCNC: 56 U/L (ref 40–129)
ALT SERPL-CCNC: 15 U/L (ref 10–40)
ANION GAP SERPL CALCULATED.3IONS-SCNC: 8 MMOL/L (ref 3–16)
AST SERPL-CCNC: 21 U/L (ref 15–37)
BASOPHILS ABSOLUTE: 0 K/UL (ref 0–0.2)
BASOPHILS RELATIVE PERCENT: 0.8 %
BILIRUB SERPL-MCNC: 0.7 MG/DL (ref 0–1)
BUN BLDV-MCNC: 22 MG/DL (ref 7–20)
CALCIUM SERPL-MCNC: 9.4 MG/DL (ref 8.3–10.6)
CHLORIDE BLD-SCNC: 103 MMOL/L (ref 99–110)
CHOLESTEROL, TOTAL: 187 MG/DL (ref 0–199)
CO2: 29 MMOL/L (ref 21–32)
CREAT SERPL-MCNC: 1.3 MG/DL (ref 0.8–1.3)
EOSINOPHILS ABSOLUTE: 0 K/UL (ref 0–0.6)
EOSINOPHILS RELATIVE PERCENT: 0.9 %
GFR AFRICAN AMERICAN: >60
GFR NON-AFRICAN AMERICAN: 54
GLOBULIN: 2.4 G/DL
GLUCOSE BLD-MCNC: 84 MG/DL (ref 70–99)
HCT VFR BLD CALC: 39.3 % (ref 40.5–52.5)
HDLC SERPL-MCNC: 60 MG/DL (ref 40–60)
HEMOGLOBIN: 13.6 G/DL (ref 13.5–17.5)
LDL CHOLESTEROL CALCULATED: 111 MG/DL
LYMPHOCYTES ABSOLUTE: 0.8 K/UL (ref 1–5.1)
LYMPHOCYTES RELATIVE PERCENT: 19.8 %
MCH RBC QN AUTO: 31.8 PG (ref 26–34)
MCHC RBC AUTO-ENTMCNC: 34.7 G/DL (ref 31–36)
MCV RBC AUTO: 91.7 FL (ref 80–100)
MONOCYTES ABSOLUTE: 0.6 K/UL (ref 0–1.3)
MONOCYTES RELATIVE PERCENT: 14.2 %
NEUTROPHILS ABSOLUTE: 2.7 K/UL (ref 1.7–7.7)
NEUTROPHILS RELATIVE PERCENT: 64.3 %
PDW BLD-RTO: 13.8 % (ref 12.4–15.4)
PLATELET # BLD: 177 K/UL (ref 135–450)
PMV BLD AUTO: 8.3 FL (ref 5–10.5)
POTASSIUM SERPL-SCNC: 4.5 MMOL/L (ref 3.5–5.1)
RBC # BLD: 4.29 M/UL (ref 4.2–5.9)
SODIUM BLD-SCNC: 140 MMOL/L (ref 136–145)
TOTAL PROTEIN: 6.9 G/DL (ref 6.4–8.2)
TRIGL SERPL-MCNC: 82 MG/DL (ref 0–150)
TSH REFLEX: 0.71 UIU/ML (ref 0.27–4.2)
VLDLC SERPL CALC-MCNC: 16 MG/DL
WBC # BLD: 4.2 K/UL (ref 4–11)

## 2021-06-30 ENCOUNTER — OFFICE VISIT (OUTPATIENT)
Dept: INTERNAL MEDICINE CLINIC | Age: 71
End: 2021-06-30
Payer: MEDICARE

## 2021-06-30 VITALS
SYSTOLIC BLOOD PRESSURE: 118 MMHG | WEIGHT: 145.2 LBS | BODY MASS INDEX: 21.51 KG/M2 | RESPIRATION RATE: 12 BRPM | DIASTOLIC BLOOD PRESSURE: 78 MMHG | HEART RATE: 68 BPM | HEIGHT: 69 IN

## 2021-06-30 DIAGNOSIS — J30.9 ALLERGIC SINUSITIS: ICD-10-CM

## 2021-06-30 DIAGNOSIS — K21.00 GASTROESOPHAGEAL REFLUX DISEASE WITH ESOPHAGITIS WITHOUT HEMORRHAGE: ICD-10-CM

## 2021-06-30 DIAGNOSIS — C61 PROSTATE CANCER (HCC): ICD-10-CM

## 2021-06-30 DIAGNOSIS — Z00.00 ROUTINE GENERAL MEDICAL EXAMINATION AT A HEALTH CARE FACILITY: ICD-10-CM

## 2021-06-30 DIAGNOSIS — G43.709 CHRONIC MIGRAINE WITHOUT AURA WITHOUT STATUS MIGRAINOSUS, NOT INTRACTABLE: ICD-10-CM

## 2021-06-30 DIAGNOSIS — F41.9 ANXIETY: ICD-10-CM

## 2021-06-30 DIAGNOSIS — Z00.00 WELL ADULT EXAM: Primary | ICD-10-CM

## 2021-06-30 DIAGNOSIS — M54.32 SCIATICA OF LEFT SIDE: ICD-10-CM

## 2021-06-30 PROBLEM — R10.33 PERIUMBILICAL ABDOMINAL PAIN: Status: RESOLVED | Noted: 2017-11-20 | Resolved: 2021-06-30

## 2021-06-30 PROBLEM — M25.542 PAIN IN THUMB JOINT WITH MOVEMENT, LEFT: Status: RESOLVED | Noted: 2019-02-12 | Resolved: 2021-06-30

## 2021-06-30 PROBLEM — K64.4 EXTERNAL HEMORRHOID: Status: RESOLVED | Noted: 2020-02-05 | Resolved: 2021-06-30

## 2021-06-30 PROCEDURE — G0439 PPPS, SUBSEQ VISIT: HCPCS | Performed by: INTERNAL MEDICINE

## 2021-06-30 PROCEDURE — 99214 OFFICE O/P EST MOD 30 MIN: CPT | Performed by: INTERNAL MEDICINE

## 2021-06-30 SDOH — ECONOMIC STABILITY: FOOD INSECURITY: WITHIN THE PAST 12 MONTHS, YOU WORRIED THAT YOUR FOOD WOULD RUN OUT BEFORE YOU GOT MONEY TO BUY MORE.: NEVER TRUE

## 2021-06-30 SDOH — ECONOMIC STABILITY: FOOD INSECURITY: WITHIN THE PAST 12 MONTHS, THE FOOD YOU BOUGHT JUST DIDN'T LAST AND YOU DIDN'T HAVE MONEY TO GET MORE.: NEVER TRUE

## 2021-06-30 ASSESSMENT — ENCOUNTER SYMPTOMS
EYES NEGATIVE: 1
GASTROINTESTINAL NEGATIVE: 1
ALLERGIC/IMMUNOLOGIC NEGATIVE: 1
RESPIRATORY NEGATIVE: 1
BACK PAIN: 1

## 2021-06-30 ASSESSMENT — PATIENT HEALTH QUESTIONNAIRE - PHQ9
SUM OF ALL RESPONSES TO PHQ QUESTIONS 1-9: 0
SUM OF ALL RESPONSES TO PHQ9 QUESTIONS 1 & 2: 0
1. LITTLE INTEREST OR PLEASURE IN DOING THINGS: 0
SUM OF ALL RESPONSES TO PHQ QUESTIONS 1-9: 0
SUM OF ALL RESPONSES TO PHQ QUESTIONS 1-9: 0
2. FEELING DOWN, DEPRESSED OR HOPELESS: 0

## 2021-06-30 ASSESSMENT — LIFESTYLE VARIABLES
HOW MANY STANDARD DRINKS CONTAINING ALCOHOL DO YOU HAVE ON A TYPICAL DAY: 0
HOW OFTEN DO YOU HAVE SIX OR MORE DRINKS ON ONE OCCASION: 1
AUDIT-C TOTAL SCORE: 3
HOW OFTEN DO YOU HAVE A DRINK CONTAINING ALCOHOL: 2

## 2021-06-30 ASSESSMENT — SOCIAL DETERMINANTS OF HEALTH (SDOH): HOW HARD IS IT FOR YOU TO PAY FOR THE VERY BASICS LIKE FOOD, HOUSING, MEDICAL CARE, AND HEATING?: NOT HARD AT ALL

## 2021-06-30 NOTE — ASSESSMENT & PLAN NOTE
Well-controlled, continue current medications and lifestyle modifications recommended with diet and prn meds

## 2021-06-30 NOTE — PROGRESS NOTES
Subjective:      Patient ID: Ham Lubin is a 66 y.o. male. HPI  Here today for medicare complete physical and review of chronic problems as listed under assessment and plan,no new c/o feels good       No Known Allergies    Current Outpatient Medications   Medication Sig Dispense Refill    meloxicam (MOBIC) 7.5 MG tablet Take 7.5 mg by mouth as needed for Pain      latanoprost (XALATAN) 0.005 % ophthalmic solution Place 1 drop into the right eye nightly      omeprazole (PRILOSEC) 20 MG capsule TAKE 1 CAPSULE DAILY 90 capsule 0     No current facility-administered medications for this visit. Past Medical History:   Diagnosis Date    Cancer [facility])     prostate Dr Heraclio Eid s/p rads with cyber knife     Dupuytrens contracture     Foot pain, left     Migraine     Mole (skin)     Nocturia     Reflux esophagitis     Shoulder pain        Family History   Problem Relation Age of Onset    Cancer Mother     Stroke Brother        Past Surgical History:   Procedure Laterality Date    COLONOSCOPY   &     Dr Ger Joseph repeat 2018    COLONOSCOPY  2018    Dr. Kaci Adams polyp removed0- repeat    [address]    detached retina bilat     FINGER TRIGGER RELEASE Left 13    middle finger    PROSTATE SURGERY  10/2013    mult Bx  1 +Bx -s/p cyber knife 2014       Social History     Socioeconomic History    Marital status: Single     Spouse name: Not on file    Number of children: Not on file    Years of education: Not on file    Highest education level: Not on file   Occupational History    Occupation:    Tobacco Use    Smoking status: Former Smoker     Packs/day: 1.00     Years: 10.00     Pack years: 10.00     Quit date: 1980     Years since quittin.5    Smokeless tobacco: Never Used   Substance and Sexual Activity    Alcohol use: Yes     Alcohol/week: 0.0 standard drinks     Comment: occ    Drug use:  Yes    Sexual activity: Not on file Other Topics Concern    Not on file   Social History Narrative    Not on file     Social Determinants of Health     Financial Resource Strain: Low Risk     Difficulty of Paying Living Expenses: Not hard at all   Food Insecurity: No Food Insecurity    Worried About Running Out of Food in the Last Year: Never true    [address] in the Last Year: Never true   Transportation Needs:     Lack of Transportation (Medical):  Lack of Transportation (Non-Medical):    Physical Activity:     Days of Exercise per Week:     Minutes of Exercise per Session:    Stress:     Feeling of Stress :    Social Connections:     Frequency of Communication with Friends and Family:     Frequency of Social Gatherings with Friends and Family:     Attends Judaism Services:     Active Member of Clubs or Organizations:     Attends Club or Organization Meetings:     Marital Status:    Intimate Partner Violence:     Fear of Current or Ex-Partner:     Emotionally Abused:     Physically Abused:     Sexually Abused:        Review of Systems  Review of Systems   Constitutional: Positive for unexpected weight change (down 9#). HENT: Negative. Eyes: Negative. S/P cataracts   Respiratory: Negative. Cardiovascular: Negative. Gastrointestinal: Negative. Occ GERD   Repeat colonoscopy 2013 polyp-2018- repeat 2022   Endocrine: Negative. Genitourinary: Positive for frequency and urgency. Occ nocturia-1 + BX for prostate CA  S/p cyber knife 9/2014 now PSA decreased  as noted cont with Dr Yvette Monique now PSA has gone up to 5   F/U as noted  Had a PET scan that was negative 12/18- my need Bx in future as per Dr Yvette Monique    Musculoskeletal: Positive for arthralgias (occ L shoulder ) and back pain (chronic in nature ? related to prostae ). Skin: Negative. Allergic/Immunologic: Negative. Neurological: Positive for headaches. Hematological: Negative. Psychiatric/Behavioral: Negative. Objective:   Physical Exam:  Physical Exam  Constitutional:       Appearance: He is well-developed. HENT:      Head: Normocephalic and atraumatic. Right Ear: External ear normal.      Left Ear: External ear normal.      Nose: Nose normal.   Eyes:      Conjunctiva/sclera: Conjunctivae normal.      Pupils: Pupils are equal, round, and reactive to light. Neck:      Thyroid: No thyromegaly. Trachea: No tracheal deviation. Cardiovascular:      Rate and Rhythm: Normal rate and regular rhythm. Heart sounds: Normal heart sounds. Pulmonary:      Effort: Pulmonary effort is normal. No respiratory distress. Breath sounds: Normal breath sounds. No wheezing. Abdominal:      General: Abdomen is flat. Bowel sounds are normal.      Palpations: Abdomen is soft. Genitourinary:     Comments: As per Dr Tate Course   Musculoskeletal:         General: Tenderness (low back pain L pelvic brim region ) present. Normal range of motion. Cervical back: Normal range of motion and neck supple. Skin:     General: Skin is warm and dry. Neurological:      General: No focal deficit present. Mental Status: He is alert and oriented to person, place, and time. Mental status is at baseline. Deep Tendon Reflexes: Reflexes are normal and symmetric. Psychiatric:         Mood and Affect: Mood normal.         Behavior: Behavior normal.         Thought Content:  Thought content normal.         /78 (Site: Right Upper Arm, Position: Sitting, Cuff Size: Medium Adult)   Pulse 68   Resp 12   Ht 5' 9\" (1.753 m)   Wt 145 lb 3.2 oz (65.9 kg)   BMI 21.44 kg/m²       Assessment & Plan:         Prostate cancer ([facility] [address]   Monitored by specialist- no acute findings meriting change in the plan see Dr Emily Wick waiting to get PSMA scan at  there is an insurance issue     Migraine  occ symptoms prn meds     Anxiety   Stable no current meds     Allergic sinusitis   Cont prn Tx with Flonase,Claritin and Mucinex Laterality Date    COLONOSCOPY   &     Dr Maggy Bustillo repeat 2018    COLONOSCOPY  2018    Dr. Pat Rod polyp removed0- repeat    [address]    detached retina bilat     FINGER TRIGGER RELEASE Left 13    middle finger    PROSTATE SURGERY  10/2013    mult Bx  1 +Bx -s/p cyber knife 2014     Family History   Problem Relation Age of Onset    Cancer Mother     Stroke Brother      Social History     Socioeconomic History    Marital status: Single     Spouse name: Not on file    Number of children: Not on file    Years of education: Not on file    Highest education level: Not on file   Occupational History    Occupation:    Tobacco Use    Smoking status: Former Smoker     Packs/day: 1.00     Years: 10.00     Pack years: 10.00     Quit date: 1980     Years since quittin.5    Smokeless tobacco: Never Used   Substance and Sexual Activity    Alcohol use: Yes     Alcohol/week: 0.0 standard drinks     Comment: occ    Drug use: Yes    Sexual activity: Not on file   Other Topics Concern    Not on file   Social History Narrative    Not on file     Social Determinants of Health     Financial Resource Strain: Low Risk     Difficulty of Paying Living Expenses: Not hard at all   Food Insecurity: No Food Insecurity    Worried About Running Out of Food in the Last Year: Never true    [address] in the Last Year: Never true   Transportation Needs:     Lack of Transportation (Medical):      Lack of Transportation (Non-Medical):    Physical Activity:     Days of Exercise per Week:     Minutes of Exercise per Session:    Stress:     Feeling of Stress :    Social Connections:     Frequency of Communication with Friends and Family:     Frequency of Social Gatherings with Friends and Family:     Attends Yazidi Services:     Active Member of Clubs or Organizations:     Attends Club or Organization Meetings:     Marital Status:    Intimate Active  Not Asked               Alcohol Screening: Audit-C Score: 3    A score of 8 or more is associated with harmful or hazardous drinking. A score of 13 or more in women, and 15 or more in men, is likely to indicate alcohol dependence. Substance Abuse Interventions:  · NA    General Health and ACP:  General  In general, how would you say your health is?: Very Good  In the past 7 days, have you experienced any of the following?  New or Increased Pain, New or Increased Fatigue, Loneliness, Social Isolation, Stress or Anger?: None of These  Do you get the social and emotional support that you need?: Yes  Do you have a Living Will?: (!) No  Advance Directives     Power of [address] Will ACP-Advance Directive ACP-Power of     Not on File Not on File Not on File Not on File      General Health Risk Interventions:  · No Living Will: Advance Care Planning addressed with patient today    Health Habits/Nutrition:  Health Habits/Nutrition  Do you exercise for at least 20 minutes 2-3 times per week?: Yes  Have you lost any weight without trying in the past 3 months?: (!) Yes  Do you eat only one meal per day?: No  Have you seen the dentist within the past year?: Yes  Body mass index: 21.44  Health Habits/Nutrition Interventions:  · NA     Safety:  Safety  Do you have working smoke detectors?: Yes  Have all throw rugs been removed or fastened?: (!) No  Do you have non-slip mats or surfaces in all bathtubs/showers?: (!) No  Do all of your stairways have a railing or banister?: Yes  Are your doorways, halls and stairs free of clutter?: Yes  Do you always fasten your seatbelt when you are in a car?: Yes  Safety Interventions:  · Home safety tips provided     Personalized Preventive Plan   Current Health Maintenance Status  Immunization History   Administered Date(s) Administered    COVID-19, Moderna, PF, 100mcg/0.5mL 02/08/2021, 03/11/2021    Influenza Virus Vaccine 09/23/2008, 09/09/2009    Influenza, High

## 2021-06-30 NOTE — ASSESSMENT & PLAN NOTE
Monitored by specialist- no acute findings meriting change in the plan see Dr Kareem Harper waiting to get PSMA scan at  there is an insurance issue

## 2021-07-27 ENCOUNTER — HOSPITAL ENCOUNTER (OUTPATIENT)
Dept: CT IMAGING | Age: 71
Discharge: HOME | End: 2021-07-27
Payer: MEDICARE

## 2021-07-27 DIAGNOSIS — R97.20 ELEVATED PROSTATE SPECIFIC ANTIGEN (PSA): ICD-10-CM

## 2021-07-27 LAB
GFR AFRICAN AMERICAN: >60
GFR NON-AFRICAN AMERICAN: 54
PERFORMED ON: ABNORMAL
POC CREATININE: 1.3 MG/DL (ref 0.8–1.3)
POC SAMPLE TYPE: ABNORMAL

## 2021-07-27 PROCEDURE — 82565 ASSAY OF CREATININE: CPT

## 2021-07-27 PROCEDURE — 6360000004 HC RX CONTRAST MEDICATION: Performed by: INTERNAL MEDICINE

## 2021-07-27 PROCEDURE — 74178 CT ABD&PLV WO CNTR FLWD CNTR: CPT

## 2021-07-27 RX ADMIN — IOPAMIDOL 80 ML: 755 INJECTION, SOLUTION INTRAVENOUS at 14:30

## 2021-07-30 PROBLEM — Z00.00 WELL ADULT EXAM: Status: RESOLVED | Noted: 2018-12-24 | Resolved: 2021-07-30

## 2021-09-13 ENCOUNTER — OFFICE VISIT (OUTPATIENT)
Dept: ENT CLINIC | Age: 71
End: 2021-09-13
Payer: MEDICARE

## 2021-09-13 VITALS
TEMPERATURE: 99 F | HEART RATE: 70 BPM | HEIGHT: 69 IN | WEIGHT: 148.6 LBS | DIASTOLIC BLOOD PRESSURE: 73 MMHG | SYSTOLIC BLOOD PRESSURE: 124 MMHG | BODY MASS INDEX: 22.01 KG/M2

## 2021-09-13 DIAGNOSIS — H61.23 BILATERAL IMPACTED CERUMEN: Primary | ICD-10-CM

## 2021-09-13 DIAGNOSIS — J30.2 SEASONAL ALLERGIC RHINITIS, UNSPECIFIED TRIGGER: ICD-10-CM

## 2021-09-13 PROCEDURE — 99203 OFFICE O/P NEW LOW 30 MIN: CPT | Performed by: OTOLARYNGOLOGY

## 2021-09-13 PROCEDURE — 69210 REMOVE IMPACTED EAR WAX UNI: CPT | Performed by: OTOLARYNGOLOGY

## 2021-09-13 NOTE — PROGRESS NOTES
reviewed. Except as noted in history of present illness, there is no pertinent family history      REVIEW OF SYSTEMS:  All pertinent positive and negative review of systems included in HPI. Otherwise, all systems are reviewed and negative. PHYSICAL EXAMINATION:   GENERAL: wdwn- no acute distress  RESPIRATORY:  No stridor or respiratory distress  COMMUNICATION :  Normal voice  MENTAL STATUS:  Mood and affect normal, oriented X 3  HEAD AND FACE:  No abnormalities of the skin of face or head  EXTERNAL EARS AND NOSE:  Normal pinnae bilateral  FACIAL MUSCLES:  All branches of facial nerve intact  EXTRAOCULAR MUSCLES: Intact with full range of motion  FACE PALPATION:  No tenderness over sinuses. Zygomatic arches and orbital rims intact  OTOSCOPY: Cerumen occludes both external auditory canals. Cerumen removed from both external auditory canals using curettes. Tympanic membranes and middle ear spaces are normal.  TUNING FORKS: Rinne ++ Guan midline at 512 Hz  INTRANASAL:  Septum midline, turbinates normal, meati clear. LIPS, TEETH, GINGIVA:  Normal mucosa  PHARYNX:  Normal  NECK:  No masses. LYMPHATIC:  No cervical adenopathy  SALIVARY GLANDS:  No swelling or masses in the parotid or submandibular salivary glands  THYROID:  No goiter or thyroid masses. IMPRESSION: Impacted cerumen. Seasonal inhalant allergies. PLAN: I will see the patient when he becomes symptomatic at the change of season and give him betamethasone 6 mg intramuscularly. FOLLOW-UP: As needed.

## 2021-11-05 ENCOUNTER — HOSPITAL ENCOUNTER (OUTPATIENT)
Age: 71
Discharge: HOME | End: 2021-11-05
Payer: MEDICARE

## 2021-11-05 ENCOUNTER — OFFICE VISIT (OUTPATIENT)
Dept: INTERNAL MEDICINE CLINIC | Age: 71
End: 2021-11-05
Payer: MEDICARE

## 2021-11-05 ENCOUNTER — HOSPITAL ENCOUNTER (OUTPATIENT)
Dept: GENERAL RADIOLOGY | Age: 71
Discharge: HOME | End: 2021-11-05
Payer: MEDICARE

## 2021-11-05 VITALS
SYSTOLIC BLOOD PRESSURE: 126 MMHG | WEIGHT: 154 LBS | OXYGEN SATURATION: 99 % | HEART RATE: 82 BPM | DIASTOLIC BLOOD PRESSURE: 80 MMHG | TEMPERATURE: 98.4 F | BODY MASS INDEX: 22.74 KG/M2

## 2021-11-05 DIAGNOSIS — R10.32 LEFT LOWER QUADRANT ABDOMINAL PAIN: Primary | ICD-10-CM

## 2021-11-05 DIAGNOSIS — R10.32 LEFT LOWER QUADRANT ABDOMINAL PAIN: ICD-10-CM

## 2021-11-05 PROCEDURE — 99213 OFFICE O/P EST LOW 20 MIN: CPT | Performed by: HOSPITALIST

## 2021-11-05 PROCEDURE — 74018 RADEX ABDOMEN 1 VIEW: CPT

## 2021-11-05 NOTE — PROGRESS NOTES
Follow Up Visit Established Patient Visit    Patient:  Christine Servin                                               : 1950  Age: 66 y.o. MRN: [id number]  Date : 2021    Christine Servin is a 66 y.o. male who presents for : Evaluation of left lower quadrant abdominal pain    Chief Complaint   Patient presents with    Abdominal Pain     Intermittent RLQ abdominal pain accompanied by nausea in the last 6-7 weeks. Last colonoscopy in 2018; 1 polyp was removed. Has history of prostate cancer; follows up with Dr Mirela Coppola. Was recently treated with radiation therapy for metastatic prostate CA the patient does not report fever/chills. No unexplained weight loss. Pain does not wake him up from his sleep at night. Past Medical History:   Diagnosis Date    Cancer [facility])     prostate Dr Mirela Coppola s/p rads with cyber knife     Dupuytrens contracture     Foot pain, left     GERD (gastroesophageal reflux disease)     Migraine     Mole (skin)     Nocturia     Reflux esophagitis     Shoulder pain     Sinusitis        Past Surgical History:   Procedure Laterality Date    COLONOSCOPY   &     Dr Susan Chavarria repeat 2018    COLONOSCOPY  2018    Dr. Rah Zavala polyp removed0- repeat    [address]    detached retina bilat     FINGER TRIGGER RELEASE Left 13    middle finger    PROSTATE SURGERY  10/2013    mult Bx  1 +Bx -s/p cyber knife 2014       Current Outpatient Medications   Medication Sig Dispense Refill    latanoprost (XALATAN) 0.005 % ophthalmic solution Place 1 drop into the right eye nightly      omeprazole (PRILOSEC) 20 MG capsule TAKE 1 CAPSULE DAILY 90 capsule 0     No current facility-administered medications for this visit. /80   Pulse 82   Temp 98.4 °F (36.9 °C)   Wt 154 lb (69.9 kg)   SpO2 99%   BMI 22.74 kg/m²     Physical Exam   Physical Exam  Vitals and nursing note reviewed.    Constitutional:       General: He is not in acute distress. Appearance: He is well-developed. HENT:      Head: Normocephalic and atraumatic. Mouth/Throat:      Pharynx: No oropharyngeal exudate. Eyes:      General: No scleral icterus. Pupils: Pupils are equal, round, and reactive to light. Neck:      Vascular: No JVD. Cardiovascular:      Rate and Rhythm: Normal rate and regular rhythm. Heart sounds: Normal heart sounds. No murmur heard. No friction rub. No gallop. Pulmonary:      Effort: Pulmonary effort is normal. No respiratory distress. Breath sounds: Normal breath sounds. No wheezing or rales. Abdominal:      General: Bowel sounds are normal. There is no distension. Palpations: Abdomen is soft. Tenderness: There is abdominal tenderness (mild left sided abdoinal pain, more in LLQ area). Musculoskeletal:         General: Normal range of motion. Cervical back: Normal range of motion. Right lower leg: No edema. Left lower leg: No edema. Skin:     General: Skin is warm and dry. Findings: No erythema or rash. Neurological:      General: No focal deficit present. Mental Status: He is alert and oriented to person, place, and time. Cranial Nerves: No cranial nerve deficit. Psychiatric:         Mood and Affect: Mood normal.         Assessment/ plan:     Saurabh Haywood was seen today for abdominal pain. 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 May need to have GI evaluation for further investigation    Discussed the patient's condition with Dr Tessa Theodore, radiation oncology specialist.    Return if symptoms worsen or fail to improve.     Bry Bell MD

## 2021-11-09 DIAGNOSIS — R10.32 LEFT LOWER QUADRANT ABDOMINAL PAIN: Primary | ICD-10-CM

## 2021-11-12 ENCOUNTER — TELEPHONE (OUTPATIENT)
Dept: INTERNAL MEDICINE CLINIC | Age: 71
End: 2021-11-12

## 2021-11-12 NOTE — TELEPHONE ENCOUNTER
----- Message from Berta Flower sent at 11/12/2021  3:47 PM EST -----  Subject: Message to Provider    QUESTIONS  Information for Provider? patient wants a call back about kidney function   test 1.3 patient says doctor called him tuesday night about it   ---------------------------------------------------------------------------  --------------  CALL BACK INFO  What is the best way for the office to contact you? OK to leave message on   voicemail  Preferred Call Back Phone Number? [phone]  ---------------------------------------------------------------------------  --------------  SCRIPT ANSWERS  Relationship to Patient?  Self

## 2021-11-18 DIAGNOSIS — R10.32 LEFT LOWER QUADRANT ABDOMINAL PAIN: ICD-10-CM

## 2021-11-18 LAB
ALBUMIN SERPL-MCNC: 4.6 G/DL (ref 3.4–5)
ANION GAP SERPL CALCULATED.3IONS-SCNC: 14 MMOL/L (ref 3–16)
BUN BLDV-MCNC: 16 MG/DL (ref 7–20)
CALCIUM SERPL-MCNC: 9.7 MG/DL (ref 8.3–10.6)
CHLORIDE BLD-SCNC: 100 MMOL/L (ref 99–110)
CO2: 26 MMOL/L (ref 21–32)
CREAT SERPL-MCNC: 1.2 MG/DL (ref 0.8–1.3)
GFR AFRICAN AMERICAN: >60
GFR NON-AFRICAN AMERICAN: 59
GLUCOSE BLD-MCNC: 87 MG/DL (ref 70–99)
PHOSPHORUS: 3.5 MG/DL (ref 2.5–4.9)
POTASSIUM SERPL-SCNC: 4.5 MMOL/L (ref 3.5–5.1)
SODIUM BLD-SCNC: 140 MMOL/L (ref 136–145)

## 2021-11-22 LAB — PSA, TOTAL: 24.21 NG/ML (ref 0–4)

## 2021-12-28 ASSESSMENT — LIFESTYLE VARIABLES
HAS A RELATIVE, FRIEND, DOCTOR, OR ANOTHER HEALTH PROFESSIONAL EXPRESSED CONCERN ABOUT YOUR DRINKING OR SUGGESTED YOU CUT DOWN: 0
AUDIT-C TOTAL SCORE: 2
HOW OFTEN DO YOU HAVE SIX OR MORE DRINKS ON ONE OCCASION: LESS THAN MONTHLY
HOW OFTEN DURING THE LAST YEAR HAVE YOU HAD A FEELING OF GUILT OR REMORSE AFTER DRINKING: NEVER
HOW OFTEN DO YOU HAVE A DRINK CONTAINING ALCOHOL: MONTHLY OR LESS
HOW OFTEN DURING THE LAST YEAR HAVE YOU HAD A FEELING OF GUILT OR REMORSE AFTER DRINKING: 0
HOW OFTEN DURING THE LAST YEAR HAVE YOU NEEDED AN ALCOHOLIC DRINK FIRST THING IN THE MORNING TO GET YOURSELF GOING AFTER A NIGHT OF HEAVY DRINKING: 0
HOW OFTEN DURING THE LAST YEAR HAVE YOU BEEN UNABLE TO REMEMBER WHAT HAPPENED THE NIGHT BEFORE BECAUSE YOU HAD BEEN DRINKING: NEVER
HOW OFTEN DURING THE LAST YEAR HAVE YOU NEEDED AN ALCOHOLIC DRINK FIRST THING IN THE MORNING TO GET YOURSELF GOING AFTER A NIGHT OF HEAVY DRINKING: NEVER
HOW OFTEN DO YOU HAVE A DRINK CONTAINING ALCOHOL: 1
HAVE YOU OR SOMEONE ELSE BEEN INJURED AS A RESULT OF YOUR DRINKING: NO
HOW MANY STANDARD DRINKS CONTAINING ALCOHOL DO YOU HAVE ON A TYPICAL DAY: 0
HAS A RELATIVE, FRIEND, DOCTOR, OR ANOTHER HEALTH PROFESSIONAL EXPRESSED CONCERN ABOUT YOUR DRINKING OR SUGGESTED YOU CUT DOWN: NO
HOW OFTEN DURING THE LAST YEAR HAVE YOU FOUND THAT YOU WERE NOT ABLE TO STOP DRINKING ONCE YOU HAD STARTED: NEVER
HAVE YOU OR SOMEONE ELSE BEEN INJURED AS A RESULT OF YOUR DRINKING: 0
HOW OFTEN DO YOU HAVE SIX OR MORE DRINKS ON ONE OCCASION: 1
HOW OFTEN DURING THE LAST YEAR HAVE YOU FAILED TO DO WHAT WAS NORMALLY EXPECTED FROM YOU BECAUSE OF DRINKING: NEVER
HOW OFTEN DURING THE LAST YEAR HAVE YOU FAILED TO DO WHAT WAS NORMALLY EXPECTED FROM YOU BECAUSE OF DRINKING: 0
HOW MANY STANDARD DRINKS CONTAINING ALCOHOL DO YOU HAVE ON A TYPICAL DAY: ONE OR TWO
AUDIT-C TOTAL SCORE: 0
AUDIT TOTAL SCORE: 2
AUDIT TOTAL SCORE: 0
HOW OFTEN DURING THE LAST YEAR HAVE YOU FOUND THAT YOU WERE NOT ABLE TO STOP DRINKING ONCE YOU HAD STARTED: 0
HOW OFTEN DURING THE LAST YEAR HAVE YOU BEEN UNABLE TO REMEMBER WHAT HAPPENED THE NIGHT BEFORE BECAUSE YOU HAD BEEN DRINKING: 0

## 2021-12-28 ASSESSMENT — PATIENT HEALTH QUESTIONNAIRE - PHQ9
2. FEELING DOWN, DEPRESSED OR HOPELESS: 0
SUM OF ALL RESPONSES TO PHQ QUESTIONS 1-9: 0
SUM OF ALL RESPONSES TO PHQ9 QUESTIONS 1 & 2: 0
1. LITTLE INTEREST OR PLEASURE IN DOING THINGS: 0

## 2022-01-03 ENCOUNTER — OFFICE VISIT (OUTPATIENT)
Dept: INTERNAL MEDICINE CLINIC | Age: 72
End: 2022-01-03
Payer: MEDICARE

## 2022-01-03 VITALS
OXYGEN SATURATION: 97 % | WEIGHT: 154.2 LBS | DIASTOLIC BLOOD PRESSURE: 80 MMHG | BODY MASS INDEX: 22.84 KG/M2 | HEART RATE: 100 BPM | RESPIRATION RATE: 14 BRPM | TEMPERATURE: 97.2 F | HEIGHT: 69 IN | SYSTOLIC BLOOD PRESSURE: 128 MMHG

## 2022-01-03 DIAGNOSIS — C61 PROSTATE CANCER (HCC): ICD-10-CM

## 2022-01-03 DIAGNOSIS — F41.9 ANXIETY: ICD-10-CM

## 2022-01-03 DIAGNOSIS — J30.9 ALLERGIC SINUSITIS: ICD-10-CM

## 2022-01-03 DIAGNOSIS — K21.00 GASTROESOPHAGEAL REFLUX DISEASE WITH ESOPHAGITIS WITHOUT HEMORRHAGE: Primary | ICD-10-CM

## 2022-01-03 DIAGNOSIS — M72.0 DUPUYTREN'S CONTRACTURE OF RIGHT HAND: ICD-10-CM

## 2022-01-03 DIAGNOSIS — C80.1 CANCER (HCC): ICD-10-CM

## 2022-01-03 PROCEDURE — 99214 OFFICE O/P EST MOD 30 MIN: CPT | Performed by: INTERNAL MEDICINE

## 2022-01-03 ASSESSMENT — ENCOUNTER SYMPTOMS
ALLERGIC/IMMUNOLOGIC NEGATIVE: 1
RESPIRATORY NEGATIVE: 1
BELCHING: 1
EYES NEGATIVE: 1
GASTROINTESTINAL NEGATIVE: 1
BACK PAIN: 1

## 2022-01-03 NOTE — PROGRESS NOTES
Subjective:      Patient ID: Valeria Odom is a 66 y.o. male. HPI  Here today for follow up of chronic problems as per HPI and as problems listed under assessment and plan,no new c/o feels good     Gastroesophageal Reflux  He complains of belching and chest pain. This is a chronic problem. The current episode started more than 1 year ago. The problem occurs occasionally. The problem has been waxing and waning. The symptoms are aggravated by certain foods and lying down. He has tried a diet change and a PPI for the symptoms. The treatment provided moderate relief. No Known Allergies    Current Outpatient Medications   Medication Sig Dispense Refill    latanoprost (XALATAN) 0.005 % ophthalmic solution Place 1 drop into the right eye nightly      omeprazole (PRILOSEC) 20 MG capsule TAKE 1 CAPSULE DAILY 90 capsule 0     No current facility-administered medications for this visit.        Past Medical History:   Diagnosis Date    Cancer [facility]) 2014    prostate Dr Tiffanie Ingram s/p rads with cyber knife     Dupuytrens contracture     Foot pain, left     GERD (gastroesophageal reflux disease)     Migraine     Mole (skin)     Nocturia     Reflux esophagitis     Shoulder pain     Sinusitis        Family History   Problem Relation Age of Onset    Cancer Mother     Stroke Brother        Past Surgical History:   Procedure Laterality Date    COLONOSCOPY  2000 & 2013    Dr Markie Medina repeat 2018    COLONOSCOPY  02/08/2018    Dr. Kennedy Horner polyp removed0- repeat 2022   [address]    detached retina bilat     FINGER TRIGGER RELEASE Left 8/13/13    middle finger    PROSTATE SURGERY  10/2013    mult Bx  1 +Bx -s/p cyber knife 9/2014       Social History     Socioeconomic History    Marital status: Single     Spouse name: Not on file    Number of children: Not on file    Years of education: Not on file    Highest education level: Not on file   Occupational History    Occupation:  Tobacco Use    Smoking status: Former Smoker     Packs/day: 1.00     Years: 10.00     Pack years: 10.00     Quit date: 1980     Years since quittin.0    Smokeless tobacco: Never Used   Substance and Sexual Activity    Alcohol use: Yes     Alcohol/week: 0.0 standard drinks     Comment: occ    Drug use: Yes    Sexual activity: Not on file   Other Topics Concern    Not on file   Social History Narrative    Not on file     Social Determinants of Health     Financial Resource Strain: Low Risk     Difficulty of Paying Living Expenses: Not hard at all   Food Insecurity: No Food Insecurity    Worried About Running Out of Food in the Last Year: Never true    [address] in the Last Year: Never true   Transportation Needs:     Lack of Transportation (Medical): Not on file    Lack of Transportation (Non-Medical): Not on file   Physical Activity:     Days of Exercise per Week: Not on file    Minutes of Exercise per Session: Not on file   Stress:     Feeling of Stress : Not on file   Social Connections:     Frequency of Communication with Friends and Family: Not on file    Frequency of Social Gatherings with Friends and Family: Not on file    Attends Rastafarian Services: Not on file    Active Member of [address] [place] [address] or Organizations: Not on file    Attends Club or Organization Meetings: Not on file    Marital Status: Not on file   Intimate Partner Violence:     Fear of Current or Ex-Partner: Not on file    Emotionally Abused: Not on file    Physically Abused: Not on file    Sexually Abused: Not on file   Housing Stability:     Unable to Pay for Housing in the Last Year: Not on file    Number of [place] in the Last Year: Not on file    Unstable Housing in the Last Year: Not on file       Review of Systems  Review of Systems   Constitutional: Positive for unexpected weight change (down 9#). HENT: Negative. Eyes: Negative. S/P cataracts   Respiratory: Negative.     Cardiovascular: Positive for chest pain. Gastrointestinal: Negative. Occ GERD   Repeat colonoscopy 2013 polyp-2018- repeat 2022   Endocrine: Negative. Genitourinary: Positive for frequency and urgency. Occ nocturia-1 + BX for prostate CA  S/p cyber knife 9/2014 now PSA decreased  as noted cont with Dr Timi Sal now PSA has gone up to 5   F/U as noted  Had a PET scan that was negative 12/18- my need Bx in future as per Dr Robinette Dakins recent PSA MA pet scan that was ok but total PSA was 24 cont with Dr Timi Sal   Musculoskeletal: Positive for arthralgias (occ L shoulder ) and back pain (chronic in nature ? related to prostae ). R hand 5th finger duyptrans contracture    Skin: Negative. Allergic/Immunologic: Negative. Neurological: Positive for headaches. Hematological: Negative. Psychiatric/Behavioral: Negative. Objective:   Physical Exam:  Physical Exam  Vitals and nursing note reviewed. Constitutional:       General: He is not in acute distress. Appearance: He is well-developed. HENT:      Head: Normocephalic and atraumatic. Mouth/Throat:      Pharynx: No oropharyngeal exudate. Eyes:      General: No scleral icterus. Pupils: Pupils are equal, round, and reactive to light. Neck:      Vascular: No JVD. Cardiovascular:      Rate and Rhythm: Normal rate and regular rhythm. Heart sounds: Normal heart sounds. No murmur heard. No friction rub. No gallop. Pulmonary:      Effort: Pulmonary effort is normal. No respiratory distress. Breath sounds: Normal breath sounds. No wheezing or rales. Abdominal:      General: Bowel sounds are normal. There is no distension. Palpations: Abdomen is soft. Musculoskeletal:         General: Tenderness (tendon R 5th finger ) present. Normal range of motion. Cervical back: Normal range of motion. Right lower leg: No edema. Left lower leg: No edema. Skin:     General: Skin is warm and dry.       Findings: No erythema or rash. Neurological:      General: No focal deficit present. Mental Status: He is alert and oriented to person, place, and time. Cranial Nerves: No cranial nerve deficit.    Psychiatric:         Mood and Affect: Mood normal.         /80 (Site: Right Upper Arm, Position: Sitting, Cuff Size: Medium Adult)   Pulse 100   Temp 97.2 °F (36.2 °C) (Temporal)   Resp 14   Ht 5' 9\" (1.753 m)   Wt 154 lb 3.2 oz (69.9 kg)   SpO2 97%   BMI 22.77 kg/m²       Assessment & Plan:         Prostate cancer ([facility]    Cont F/U as noted had neg PSA Ma ie pet scan was negative but PSA elevated cont F/U as noted     Reflux esophagitis   Well-controlled, continue current medications and lifestyle modifications recommended    Cancer ([facility]   Monitored by specialist- no acute findings meriting change in the plan    Anxiety   Worse with current problems with Prostate     Allergic sinusitis   Cont prn Tx with Flonase,Claritin and Mucinex DM     Dupuytren's contracture of right hand  Getting worse to see Dr Krystal Fonseca

## 2022-02-23 ENCOUNTER — OFFICE VISIT (OUTPATIENT)
Dept: ORTHOPEDIC SURGERY | Age: 72
End: 2022-02-23
Payer: MEDICARE

## 2022-02-23 VITALS — BODY MASS INDEX: 22.81 KG/M2 | HEIGHT: 69 IN | RESPIRATION RATE: 16 BRPM | WEIGHT: 154 LBS

## 2022-02-23 DIAGNOSIS — M72.0 DUPUYTREN'S CONTRACTURE: Primary | ICD-10-CM

## 2022-02-23 PROCEDURE — 99203 OFFICE O/P NEW LOW 30 MIN: CPT | Performed by: PHYSICIAN ASSISTANT

## 2022-02-23 NOTE — PATIENT INSTRUCTIONS
Thank you for choosing [facility] for your Hand and Upper Extremity needs. If we can be of any further assistance to you, please do not hesitate to contact us.     Office Phone Number:  [phone]  or  [phone]

## 2022-02-23 NOTE — PROGRESS NOTES
Mr. Renee Unger is a 74 y.o. right handed man  who is seen today in Hand Surgical Consultation at the request of Chadwick Godwin MD.    He presents today regarding left hand abnormality which has been present for approximately 7 years. A history of antecedent trauma or injury is Absent. He reports a thickening or mass on the palm with extension onto the Small Finger. The size of the mass has been stable with time. He has noted any limitation of motion of the Small Finger;  he does not report any discomfort associated with his presenting concern. He does not report any Family History of similar hand conditions. Previous treatment has included No prior treatment to the left Small Finger. He does not claim relation of his symptoms to his required work activities. He has not undergone any form of testing. I have today reviewed with Renee Unger the clinically relevant, past medical history, medications, allergies,  family history, social history, and Review Of Systems & I have documented any details relevant to today's presenting complaints in my history above. Mr. Bobby Estevez self-reported past medical history, medications, allergies,  family history, social history, and Review Of Systems have been scanned into the chart under the \"Media\" tab. Physical Exam:  Mr. Bobby Nick's most recent vitals:  Vitals  Resp: 16  Height: 5' 9\" (175.3 cm)  Weight: 154 lb (69.9 kg)    He is well nourished, oriented to person, place & time. He demonstrates appropriate mood and affect as well as normal gait and station. Skin: Normal in appearance, Normal Color, Normal Texture and Normal color, free of lesions excepting Clinical evidence of Dupuytren's Contracture Bilaterally   Thickening with nodule formation is noted in the palm along the axis of the Small Finger on the Left, normal on the Right. There is  extension of a cord into the digit itself.   Knuckle pads are not evident over the dorsum of the Small Finger on the Left, normal on the Right. Wrist range of motion is Full bilaterally  Sensation is normal bilaterally. There is no evidence of gross joint instability bilaterally. Muscular strength is clinically appropriate bilaterally. Vascular examination reveals normal, good capillary refill and good color bilaterally. There is no Swelling of the digits on the Left, normal on the Right. There is a palpable 3 cm firm hard cord on the palm in the axis of the left Small Finger  with involvement of the Small Finger  to the level of the PIP Joint. Finger joint range of motion is abnormal, with a flexed position of the left Small Finger MCP Joint measuring 15 degrees & PIP Joint measuring 0 degrees. The \"Table Top Test\" is negative. All other fingers and joints show normal motion bilaterally      Impression:  Mr. Rufino Harkins has developed Dupuytren's Contracture, which is currently of greater concern, and presents requesting further treatment. Plan:  I have had a thorough discussion with Mr. Rufino Harkins regarding the treatment options available for his continued right  Small Finger Dupuytren's Contracture, which is causing him significant concern and difficulty. I have outlined for Mr. Rufino Harkins the risk, benefits and consequences of the various treatment modalities, including a reasonable expectation for the long term success of each. We have discussed the likelihood that further more aggressive treatment may be required for his current presenting condition. Based upon our current discussion and a reasonable understating of the options available to him, Mr. Rufino Harkins has selected to proceed with a conservative plan of treatment consisting of: careful observation, intermittent reassessment of the degree of contracture (Table-Top Test) and maintenance of full range of motion of the uninvolved digits.   I have clearly explained to him that the above outlined treatment plan should not be expected to 'cure' his  Dupuytren's Contracture or resolve his current contractures, but we are rather treating the symptoms with which he presents. He has understood that in order to achieve more durable relief of his symptoms and to prevent future worsening or further damage, that definitive treatment, in the form of surgery or enzyme injection, would be required. Mr. Guanaco Archibald  voiced an appropriate understanding of our discussion, the options available to him, and of the expectations of his selected  treatment. I have also discussed with Mr. Guanaco Archibald  the other treatment options available to him  for this condition. We have today selected to proceed with conservative management. He and I have agreed that if our current course of conservative treatment does not prove to be effective over the short term future, that he will schedule a follow-up appointment to discuss and select an alternate course of therapy including possibly injection or surgical treatment. Mr. Guanaco Archibald has been given a full verbal list of instructions and precautions related to his present condition. I have asked him to followup with me in the office at the prescribed time. He is also specifically requested to call or return to the office sooner if his symptoms change or worsen prior to the next scheduled appointment.

## 2022-06-07 LAB
PSA, TOTAL: 28.25 NG/ML (ref 0–4)
TESTOSTERONE: 347 NG/DL (ref 175–781)

## 2022-06-30 SDOH — HEALTH STABILITY: PHYSICAL HEALTH: ON AVERAGE, HOW MANY DAYS PER WEEK DO YOU ENGAGE IN MODERATE TO STRENUOUS EXERCISE (LIKE A BRISK WALK)?: 3 DAYS

## 2022-06-30 SDOH — HEALTH STABILITY: PHYSICAL HEALTH: ON AVERAGE, HOW MANY MINUTES DO YOU ENGAGE IN EXERCISE AT THIS LEVEL?: 120 MIN

## 2022-06-30 ASSESSMENT — PATIENT HEALTH QUESTIONNAIRE - PHQ9
SUM OF ALL RESPONSES TO PHQ QUESTIONS 1-9: 0
2. FEELING DOWN, DEPRESSED OR HOPELESS: 0
SUM OF ALL RESPONSES TO PHQ9 QUESTIONS 1 & 2: 0
SUM OF ALL RESPONSES TO PHQ QUESTIONS 1-9: 0
1. LITTLE INTEREST OR PLEASURE IN DOING THINGS: 0
SUM OF ALL RESPONSES TO PHQ QUESTIONS 1-9: 0
SUM OF ALL RESPONSES TO PHQ QUESTIONS 1-9: 0

## 2022-06-30 ASSESSMENT — LIFESTYLE VARIABLES
HOW OFTEN DO YOU HAVE A DRINK CONTAINING ALCOHOL: 2-4 TIMES A MONTH
HOW MANY STANDARD DRINKS CONTAINING ALCOHOL DO YOU HAVE ON A TYPICAL DAY: 1
HOW MANY STANDARD DRINKS CONTAINING ALCOHOL DO YOU HAVE ON A TYPICAL DAY: 1 OR 2
HOW OFTEN DO YOU HAVE A DRINK CONTAINING ALCOHOL: 3
HOW OFTEN DO YOU HAVE SIX OR MORE DRINKS ON ONE OCCASION: 1

## 2022-07-01 DIAGNOSIS — C61 PROSTATE CANCER (HCC): ICD-10-CM

## 2022-07-01 DIAGNOSIS — F41.9 ANXIETY: ICD-10-CM

## 2022-07-01 DIAGNOSIS — K21.00 GASTROESOPHAGEAL REFLUX DISEASE WITH ESOPHAGITIS WITHOUT HEMORRHAGE: ICD-10-CM

## 2022-07-01 DIAGNOSIS — C80.1 CANCER (HCC): ICD-10-CM

## 2022-07-01 LAB
BACTERIA: NORMAL /HPF
BASOPHILS ABSOLUTE: 0 K/UL (ref 0–0.2)
BASOPHILS RELATIVE PERCENT: 1 %
BILIRUBIN URINE: NEGATIVE
BLOOD, URINE: NEGATIVE
CLARITY: CLEAR
COLOR: YELLOW
EOSINOPHILS ABSOLUTE: 0.1 K/UL (ref 0–0.6)
EOSINOPHILS RELATIVE PERCENT: 2.1 %
EPITHELIAL CELLS, UA: 0 /HPF (ref 0–5)
GLUCOSE URINE: NEGATIVE MG/DL
HCT VFR BLD CALC: 40 % (ref 40.5–52.5)
HEMOGLOBIN: 13.5 G/DL (ref 13.5–17.5)
HYALINE CASTS: 0 /LPF (ref 0–8)
KETONES, URINE: ABNORMAL MG/DL
LEUKOCYTE ESTERASE, URINE: NEGATIVE
LYMPHOCYTES ABSOLUTE: 0.6 K/UL (ref 1–5.1)
LYMPHOCYTES RELATIVE PERCENT: 17.1 %
MCH RBC QN AUTO: 31.2 PG (ref 26–34)
MCHC RBC AUTO-ENTMCNC: 33.8 G/DL (ref 31–36)
MCV RBC AUTO: 92.3 FL (ref 80–100)
MICROSCOPIC EXAMINATION: YES
MONOCYTES ABSOLUTE: 0.4 K/UL (ref 0–1.3)
MONOCYTES RELATIVE PERCENT: 12.7 %
NEUTROPHILS ABSOLUTE: 2.3 K/UL (ref 1.7–7.7)
NEUTROPHILS RELATIVE PERCENT: 67.1 %
NITRITE, URINE: NEGATIVE
PDW BLD-RTO: 13.4 % (ref 12.4–15.4)
PH UA: 6 (ref 5–8)
PLATELET # BLD: 177 K/UL (ref 135–450)
PMV BLD AUTO: 7.9 FL (ref 5–10.5)
PROTEIN UA: ABNORMAL MG/DL
RBC # BLD: 4.34 M/UL (ref 4.2–5.9)
RBC UA: 0 /HPF (ref 0–4)
SPECIFIC GRAVITY UA: 1.02 (ref 1–1.03)
URINE REFLEX TO CULTURE: ABNORMAL
URINE TYPE: ABNORMAL
UROBILINOGEN, URINE: 0.2 E.U./DL
WBC # BLD: 3.5 K/UL (ref 4–11)
WBC UA: 0 /HPF (ref 0–5)

## 2022-07-02 LAB
A/G RATIO: 2 (ref 1.1–2.2)
ALBUMIN SERPL-MCNC: 4.7 G/DL (ref 3.4–5)
ALP BLD-CCNC: 64 U/L (ref 40–129)
ALT SERPL-CCNC: 16 U/L (ref 10–40)
ANION GAP SERPL CALCULATED.3IONS-SCNC: 15 MMOL/L (ref 3–16)
AST SERPL-CCNC: 23 U/L (ref 15–37)
BILIRUB SERPL-MCNC: 0.7 MG/DL (ref 0–1)
BUN BLDV-MCNC: 17 MG/DL (ref 7–20)
CALCIUM SERPL-MCNC: 9.6 MG/DL (ref 8.3–10.6)
CHLORIDE BLD-SCNC: 105 MMOL/L (ref 99–110)
CHOLESTEROL, TOTAL: 183 MG/DL (ref 0–199)
CO2: 24 MMOL/L (ref 21–32)
CREAT SERPL-MCNC: 1.4 MG/DL (ref 0.8–1.3)
GFR AFRICAN AMERICAN: 60
GFR NON-AFRICAN AMERICAN: 49
GLUCOSE BLD-MCNC: 100 MG/DL (ref 70–99)
HDLC SERPL-MCNC: 59 MG/DL (ref 40–60)
LDL CHOLESTEROL CALCULATED: 103 MG/DL
POTASSIUM SERPL-SCNC: 4.7 MMOL/L (ref 3.5–5.1)
SODIUM BLD-SCNC: 144 MMOL/L (ref 136–145)
TOTAL PROTEIN: 7 G/DL (ref 6.4–8.2)
TRIGL SERPL-MCNC: 103 MG/DL (ref 0–150)
TSH REFLEX: 0.92 UIU/ML (ref 0.27–4.2)
VLDLC SERPL CALC-MCNC: 21 MG/DL

## 2022-07-06 ENCOUNTER — OFFICE VISIT (OUTPATIENT)
Dept: INTERNAL MEDICINE CLINIC | Age: 72
End: 2022-07-06
Payer: MEDICARE

## 2022-07-06 VITALS
RESPIRATION RATE: 12 BRPM | DIASTOLIC BLOOD PRESSURE: 80 MMHG | HEART RATE: 67 BPM | OXYGEN SATURATION: 98 % | TEMPERATURE: 97.6 F | WEIGHT: 146 LBS | SYSTOLIC BLOOD PRESSURE: 122 MMHG | BODY MASS INDEX: 21.62 KG/M2 | HEIGHT: 69 IN

## 2022-07-06 DIAGNOSIS — F41.9 ANXIETY: ICD-10-CM

## 2022-07-06 DIAGNOSIS — Z00.00 MEDICARE ANNUAL WELLNESS VISIT, SUBSEQUENT: ICD-10-CM

## 2022-07-06 DIAGNOSIS — M72.0 DUPUYTREN'S CONTRACTURE OF RIGHT HAND: ICD-10-CM

## 2022-07-06 DIAGNOSIS — Z00.00 WELL ADULT EXAM: Primary | ICD-10-CM

## 2022-07-06 DIAGNOSIS — J30.9 ALLERGIC SINUSITIS: ICD-10-CM

## 2022-07-06 DIAGNOSIS — K21.00 GASTROESOPHAGEAL REFLUX DISEASE WITH ESOPHAGITIS WITHOUT HEMORRHAGE: ICD-10-CM

## 2022-07-06 DIAGNOSIS — G43.709 CHRONIC MIGRAINE WITHOUT AURA WITHOUT STATUS MIGRAINOSUS, NOT INTRACTABLE: ICD-10-CM

## 2022-07-06 DIAGNOSIS — C61 PROSTATE CANCER (HCC): ICD-10-CM

## 2022-07-06 PROCEDURE — 99214 OFFICE O/P EST MOD 30 MIN: CPT | Performed by: INTERNAL MEDICINE

## 2022-07-06 PROCEDURE — G0439 PPPS, SUBSEQ VISIT: HCPCS | Performed by: INTERNAL MEDICINE

## 2022-07-06 PROCEDURE — 1123F ACP DISCUSS/DSCN MKR DOCD: CPT | Performed by: INTERNAL MEDICINE

## 2022-07-06 SDOH — ECONOMIC STABILITY: FOOD INSECURITY: WITHIN THE PAST 12 MONTHS, THE FOOD YOU BOUGHT JUST DIDN'T LAST AND YOU DIDN'T HAVE MONEY TO GET MORE.: NEVER TRUE

## 2022-07-06 SDOH — ECONOMIC STABILITY: FOOD INSECURITY: WITHIN THE PAST 12 MONTHS, YOU WORRIED THAT YOUR FOOD WOULD RUN OUT BEFORE YOU GOT MONEY TO BUY MORE.: NEVER TRUE

## 2022-07-06 ASSESSMENT — ENCOUNTER SYMPTOMS
ALLERGIC/IMMUNOLOGIC NEGATIVE: 1
BACK PAIN: 1
EYES NEGATIVE: 1
GASTROINTESTINAL NEGATIVE: 1
RESPIRATORY NEGATIVE: 1

## 2022-07-06 ASSESSMENT — SOCIAL DETERMINANTS OF HEALTH (SDOH): HOW HARD IS IT FOR YOU TO PAY FOR THE VERY BASICS LIKE FOOD, HOUSING, MEDICAL CARE, AND HEATING?: NOT HARD AT ALL

## 2022-07-06 NOTE — ASSESSMENT & PLAN NOTE
Within normal limits for age- cont to work part time  no ADL issues,immunizations up to date, no depression ,no cognitive impairment  Colonoscopy up to date polyp 2018 repeat 2023  Eye exam up to date  Exercises as tolerated    No living will but does not want resuscitation info given    Findings and recommendations discussed with Pt

## 2022-07-06 NOTE — ASSESSMENT & PLAN NOTE
Well-controlled, continue current medications and lifestyle modifications recommended prn Tx with Flonase,Claritin and Mucinex DM

## 2022-07-06 NOTE — ASSESSMENT & PLAN NOTE
Monitored by specialist- no acute findings meriting change in the plan not bad enough for injection yet

## 2022-07-06 NOTE — PROGRESS NOTES
Subjective:      Patient ID: Regina Mcdaniel is a 74 y.o. male. HPI  Here today for medicare complete physical and review of chronic problems as listed under assessment and plan,no new c/o feels good       No Known Allergies    Current Outpatient Medications   Medication Sig Dispense Refill    latanoprost (XALATAN) 0.005 % ophthalmic solution Place 1 drop into the right eye nightly      omeprazole (PRILOSEC) 20 MG capsule TAKE 1 CAPSULE DAILY 90 capsule 0     No current facility-administered medications for this visit. Past Medical History:   Diagnosis Date    Cancer [facility])     prostate Dr Fidelina Gama s/p rads with cyber knife     Dupuytrens contracture     Foot pain, left     GERD (gastroesophageal reflux disease)     Migraine     Mole (skin)     Nocturia     Reflux esophagitis     Shoulder pain     Sinusitis        Family History   Problem Relation Age of Onset    Cancer Mother     Stroke Brother        Past Surgical History:   Procedure Laterality Date    COLONOSCOPY   &     Dr Pio Cedillo repeat 2018    COLONOSCOPY  2018    Dr. Markie Chapa polyp removed0- repeat    [address]    detached retina bilat     FINGER TRIGGER RELEASE Left 13    middle finger    PROSTATE SURGERY  10/2013    mult Bx  1 +Bx -s/p cyber knife 2014       Social History     Socioeconomic History    Marital status: Single     Spouse name: Not on file    Number of children: Not on file    Years of education: Not on file    Highest education level: Not on file   Occupational History    Occupation:    Tobacco Use    Smoking status: Former Smoker     Packs/day: 1.00     Years: 10.00     Pack years: 10.00     Quit date: 1980     Years since quittin.5    Smokeless tobacco: Never Used   Substance and Sexual Activity    Alcohol use: Yes     Alcohol/week: 0.0 standard drinks     Comment: occ    Drug use:  Yes    Sexual activity: Not on file   Other Topics Concern    Not on file   Social History Narrative    Not on file     Social Determinants of Health     Financial Resource Strain: Low Risk     Difficulty of Paying Living Expenses: Not hard at all   Food Insecurity: No Food Insecurity    Worried About Running Out of Food in the Last Year: Never true    [address] in the Last Year: Never true   Transportation Needs:     Lack of Transportation (Medical): Not on file    Lack of Transportation (Non-Medical): Not on file   Physical Activity: Sufficiently Active    Days of Exercise per Week: 3 days    Minutes of Exercise per Session: 120 min   Stress:     Feeling of Stress : Not on file   Social Connections:     Frequency of Communication with Friends and Family: Not on file    Frequency of Social Gatherings with Friends and Family: Not on file    Attends Buddhist Services: Not on file    Active Member of Clubs or Organizations: Not on file    Attends Club or Organization Meetings: Not on file    Marital Status: Not on file   Intimate Partner Violence:     Fear of Current or Ex-Partner: Not on file    Emotionally Abused: Not on file    Physically Abused: Not on file    Sexually Abused: Not on file   Housing Stability:     Unable to Pay for Housing in the Last Year: Not on file    Number of [place] in the Last Year: Not on file    Unstable Housing in the Last Year: Not on file       Review of Systems  Review of Systems   Constitutional: Positive for unexpected weight change (down 9#). HENT: Negative. Eyes: Negative. S/P cataracts   Respiratory: Negative. Cardiovascular: Negative. Gastrointestinal: Negative. Occ GERD   Repeat colonoscopy 2013 polyp-2018- repeat 2022   Endocrine: Negative. Genitourinary: Positive for frequency and urgency.         Occ nocturia-1 + BX for prostate CA  S/p cyber knife 9/2014 now PSA decreased  as noted cont with Dr Robles Reading now PSA has gone up to 5   F/U as noted  Had a PET scan that was negative 12/18- my need Bx in future as per Dr Román Ly    Musculoskeletal: Positive for arthralgias (occ L shoulder ) and back pain (chronic in nature ? related to prostae ). Skin: Negative. Allergic/Immunologic: Negative. Neurological: Positive for headaches. Hematological: Negative. Psychiatric/Behavioral: Negative. Objective:   Physical Exam:  Physical Exam  Constitutional:       Appearance: He is well-developed. HENT:      Head: Normocephalic and atraumatic. Right Ear: External ear normal.      Left Ear: External ear normal.      Nose: Nose normal.   Eyes:      Conjunctiva/sclera: Conjunctivae normal.      Pupils: Pupils are equal, round, and reactive to light. Neck:      Thyroid: No thyromegaly. Trachea: No tracheal deviation. Cardiovascular:      Rate and Rhythm: Normal rate and regular rhythm. Heart sounds: Normal heart sounds. Pulmonary:      Effort: Pulmonary effort is normal. No respiratory distress. Breath sounds: Normal breath sounds. No wheezing. Abdominal:      General: Abdomen is flat. Bowel sounds are normal.      Palpations: Abdomen is soft. Genitourinary:     Comments: As per Dr Román Ly   Musculoskeletal:         General: Tenderness (low back pain L pelvic brim region ) present. Normal range of motion. Cervical back: Normal range of motion and neck supple. Skin:     General: Skin is warm and dry. Neurological:      General: No focal deficit present. Mental Status: He is alert and oriented to person, place, and time. Mental status is at baseline. Deep Tendon Reflexes: Reflexes are normal and symmetric. Psychiatric:         Mood and Affect: Mood normal.         Behavior: Behavior normal.         Thought Content:  Thought content normal.         /80 (Site: Right Upper Arm, Position: Sitting, Cuff Size: Medium Adult)   Pulse 67   Temp 97.6 °F (36.4 °C) (Temporal)   Resp 12   Ht 5' 9\" (1.753 m)   Wt 146 lb (66.2 kg) SpO2 98%   BMI 21.56 kg/m²       Assessment & Plan:         Reflux esophagitis   Well-controlled, continue current medications and lifestyle modifications recommended diet and prn meds     Migraine   occ symptoms  Prn meds     Prostate cancer ([address]   Monitored by specialist- no acute findings meriting change in the plan Dr Nathalie Haywood    Allergic sinusitis   Well-controlled, continue current medications and lifestyle modifications recommended prn Tx with Flonase,Claritin and Mucinex DM     Dupuytren's contracture of right hand   Monitored by specialist- no acute findings meriting change in the plan not bad enough for injection yet     Anxiety    Stable no current meds     Well adult exam   Within normal limits for age- cont to work part time  no ADL issues,immunizations up to date, no depression ,no cognitive impairment  Colonoscopy up to date polyp 2018 repeat 2023  Eye exam up to date  Exercises as tolerated    No living will but does not want resuscitation info given    Findings and recommendations discussed with Pt      Carola Nick  1950    No Known Allergies  Current Outpatient Medications   Medication Sig Dispense Refill    latanoprost (XALATAN) 0.005 % ophthalmic solution Place 1 drop into the right eye nightly      omeprazole (PRILOSEC) 20 MG capsule TAKE 1 CAPSULE DAILY 90 capsule 0     No current facility-administered medications for this visit. Vitals:    07/06/22 1105   BP: 122/80   Site: Right Upper Arm   Position: Sitting   Cuff Size: Medium Adult   Pulse: 67   Resp: 12   Temp: 97.6 °F (36.4 °C)   TempSrc: Temporal   SpO2: 98%   Weight: 146 lb (66.2 kg)   Height: 5' 9\" (1.753 m)     Body mass index is 21.56 kg/m².      Wt Readings from Last 3 Encounters:   07/06/22 146 lb (66.2 kg)   02/23/22 154 lb (69.9 kg)   01/03/22 154 lb 3.2 oz (69.9 kg)     BP Readings from Last 3 Encounters:   07/06/22 122/80   01/03/22 128/80   11/05/21 126/80       Consultants:   Patient Care Team:  Moris Adames Ian Hand MD as PCP - Yosi East MD as PCP - [facility] Empaneled Provider  Ian Small MD as Consulting Physician (Urology)  Sheba Long MD as Consulting Physician (Otolaryngology)    Chief Complaint:   Carrie Pina is a 74 y.o. male who presents for Medicare Preventive Physical Examination with Personalized Prevention Plan Services. HPI: Tr review listed chronic problems     Patient Active Problem List   Diagnosis    Reflux esophagitis    Migraine    [facility]    Cancer [facility]    Low back strain    Allergic sinusitis    Dupuytren's contracture of right hand    Vertigo    Anxiety    Well adult exam    Sciatica of left side    Trapezius muscle spasm       Mood Disorders Screen:  Risk factors: none  Symptoms:  endorses none, denies all symptoms     Safety Assessment:  Functional ability/ADLs:  Difficulty with bathing- no, grooming- no, meals- no, incontinence- no.  Driving- yes. Home safety: Lives alone. Number of stairs to enter home: 4, within home: 12 between floors. Risk factors for falls: none. Home environment modifications:  no.     End of Life Planning:  Advanced Directive: has NO advanced directive  - add't info requested.  Referral to SW: no.      Preventive Care:  Self-testicular exams: Yes  Last PSA: 28.25  , abnormal  Last colonoscopy: 2018, abnormal-polyp  AAA screening:  no   Last eye exam: 2021,   Exercise: yes  Seatbelt use: yes      Immunization History   Administered Date(s) Administered    COVID-19, MODERNA BLUE border, Primary or Immunocompromised, (age 12y+), IM, 100 mcg/0.5mL 02/08/2021, 03/11/2021, 10/26/2021, 04/22/2022    Influenza Virus Vaccine 09/23/2008, 09/09/2009    Influenza, High Dose (Fluzone 65 yrs and older) 09/20/2014, 10/27/2015, 10/14/2016, 10/20/2017, 09/08/2018, 10/21/2019    Influenza, High-dose, Quadv, 65 yrs +, IM (Fluzone) 09/29/2020, 09/30/2021    Pneumococcal Conjugate 13-valent ([place] Side) 07/22/2015    Pneumococcal Polysaccharide ([id number]) 2013    Tdap (Boostrix, Adacel) 2008, 10/15/2018    Zoster Live (Zostavax) 2015    Zoster Recombinant (Shingrix) 10/21/2020, 2021       Past Medical History:   Diagnosis Date    Cancer [facility])     prostate Dr Humphrey Moreno s/p rads with cyber knife     Dupuytrens contracture     Foot pain, left     GERD (gastroesophageal reflux disease)     Migraine     Mole (skin)     Nocturia     Reflux esophagitis     Shoulder pain     Sinusitis      Past Surgical History:   Procedure Laterality Date    COLONOSCOPY   &     Dr Daniel Selby repeat 2018    COLONOSCOPY  2018    Dr. Jennifer Dumont polyp removed0- repeat    [address]    detached retina bilat     FINGER TRIGGER RELEASE Left 13    middle finger    PROSTATE SURGERY  10/2013    mult Bx  1 +Bx -s/p cyber knife 2014     Family History   Problem Relation Age of Onset    Cancer Mother     Stroke Brother      Social History     Socioeconomic History    Marital status: Single     Spouse name: Not on file    Number of children: Not on file    Years of education: Not on file    Highest education level: Not on file   Occupational History    Occupation:    Tobacco Use    Smoking status: Former Smoker     Packs/day: 1.00     Years: 10.00     Pack years: 10.00     Quit date: 1980     Years since quittin.5    Smokeless tobacco: Never Used   Substance and Sexual Activity    Alcohol use: Yes     Alcohol/week: 0.0 standard drinks     Comment: occ    Drug use:  Yes    Sexual activity: Not on file   Other Topics Concern    Not on file   Social History Narrative    Not on file     Social Determinants of Health     Financial Resource Strain: Low Risk     Difficulty of Paying Living Expenses: Not hard at all   Food Insecurity: No Food Insecurity    Worried About [email or web address] in the Last Year: Never true    [address] in the Last Year: Never true   Transportation Needs:     Lack of Transportation (Medical): Not on file    Lack of Transportation (Non-Medical): Not on file   Physical Activity: Sufficiently Active    Days of Exercise per Week: 3 days    Minutes of Exercise per Session: 120 min   Stress:     Feeling of Stress : Not on file   Social Connections:     Frequency of Communication with Friends and Family: Not on file    Frequency of Social Gatherings with Friends and Family: Not on file    Attends Jain Services: Not on file    Active Member of Clubs or Organizations: Not on file    Attends Club or Organization Meetings: Not on file    Marital Status: Not on file   Intimate Partner Violence:     Fear of Current or Ex-Partner: Not on file    Emotionally Abused: Not on file    Physically Abused: Not on file    Sexually Abused: Not on file   Housing Stability:     Unable to Pay for Housing in the Last Year: Not on file    Number of [place] in the Last Year: Not on file    Unstable Housing in the Last Year: Not on file     }        Visual Acuity: Corrected:            L  20/100            R 20/30    Cognitive Screening:  Clock drawing test score: 5/5. Mini-mental status exam score: NA. Assessment/Plan:  Orville Bruno was seen today for medicare awv and discuss labs.     Diagnoses and all orders for this visit:    Gastroesophageal reflux disease with esophagitis without hemorrhage    Chronic migraine without aura without status migrainosus, not intractable    Prostate cancer (HCC)    Allergic sinusitis    Dupuytren's contracture of right hand    Anxiety    Well adult exam      Medicare Annual Wellness Visit    [employer] is here for Medicare AWV and Discuss Labs    Assessment & Plan   Gastroesophageal reflux disease with esophagitis without hemorrhage  Assessment & Plan:   Well-controlled, continue current medications and lifestyle modifications recommended diet and prn meds   Chronic migraine without aura without status migrainosus, not intractable  Assessment & Plan:   occ symptoms  Prn meds   Prostate cancer [facility])  Assessment & Plan:   Monitored by specialist- no acute findings meriting change in the plan Dr Yoon Ann  Allergic sinusitis  Assessment & Plan:   Well-controlled, continue current medications and lifestyle modifications recommended prn Tx with Flonase,Claritin and Mucinex DM   Dupuytren's contracture of right hand  Assessment & Plan:   Monitored by specialist- no acute findings meriting change in the plan not bad enough for injection yet   Anxiety  Assessment & Plan:    Stable no current meds   Well adult exam  Assessment & Plan: Within normal limits for age- cont to work part time  no ADL issues,immunizations up to date, no depression ,no cognitive impairment  Colonoscopy up to date polyp 2018 repeat 2023  Eye exam up to date  Exercises as tolerated    No living will but does not want resuscitation info given    Findings and recommendations discussed with Pt       Recommendations for Preventive Services Due: see orders and patient instructions/AVS.  Recommended screening schedule for the next 5-10 years is provided to the patient in written form: see Patient Instructions/AVS.     No follow-ups on file.      Subjective   The following acute and/or chronic problems were also addressed today:  Reflux esophagitis   Well-controlled, continue current medications and lifestyle modifications recommended diet and prn meds     Migraine   occ symptoms  Prn meds     Prostate cancer ([address]   Monitored by specialist- no acute findings meriting change in the plan Dr Yoon Ann    Allergic sinusitis   Well-controlled, continue current medications and lifestyle modifications recommended prn Tx with Flonase,Claritin and Mucinex DM     Dupuytren's contracture of right hand   Monitored by specialist- no acute findings meriting change in the plan not bad enough for injection yet     Anxiety    Stable no current meds     Well adult exam Within normal limits for age- cont to work part time  no ADL issues,immunizations up to date, no depression ,no cognitive impairment  Colonoscopy up to date polyp 2018 repeat 2023  Eye exam up to date  Exercises as tolerated    No living will but does not want resuscitation info given    Findings and recommendations discussed with Pt       Patient's complete Health Risk Assessment and screening values have been reviewed and are found in Flowsheets. The following problems were reviewed today and where indicated follow up appointments were made and/or referrals ordered.     Positive Risk Factor Screenings with Interventions:         Drug Use Screening:    DAST-10 Score Interpretation:  1-2: Low level - Monitor, re-assess at a later date; 3-5: Moderate level - Further Investigation; 6-8: Substantial level - Intensive Assessment; 9-10: Severe level - Intensive Assessment    Substance Use - Drug Use Interventions:  NA       General Health and ACP:  General  In general, how would you say your health is?: Good  In the past 7 days, have you experienced any of the following: New or Increased Pain, New or Increased Fatigue, Loneliness, Social Isolation, Stress or Anger?: No  Do you get the social and emotional support that you need?: Yes  Do you have a Living Will?: (!) No    Advance Directives     Power of  Living Will ACP-Advance Directive ACP-Power of     Not on File Not on File Not on File Not on File      General Health Risk Interventions:  · No Living Will: Advance Care Planning addressed with patient today      Safety:  Do you have working smoke detectors?: Yes  Do you have any tripping hazards - loose or unsecured carpets or rugs?: No  Do you have any tripping hazards - clutter in doorways, halls, or stairs?: No  Do you have either shower bars, grab bars, non-slip mats or non-slip surfaces in your shower or bathtub?: (!) No  Do all of your stairways have a railing or banister?: Yes  Do you always fasten your seatbelt when you are in a car?: Yes    Safety Interventions:  · Home safety tips provided           Objective   Vitals:    07/06/22 1105   BP: 122/80   Site: Right Upper Arm   Position: Sitting   Cuff Size: Medium Adult   Pulse: 67   Resp: 12   Temp: 97.6 °F (36.4 °C)   TempSrc: Temporal   SpO2: 98%   Weight: 146 lb (66.2 kg)   Height: 5' 9\" (1.753 m)      Body mass index is 21.56 kg/m². No Known Allergies  Prior to Visit Medications    Medication Sig Taking?  Authorizing Provider   latanoprost (XALATAN) 0.005 % ophthalmic solution Place 1 drop into the right eye nightly Yes Historical Provider, MD   omeprazole (PRILOSEC) 20 MG capsule TAKE 1 CAPSULE DAILY Yes Cindy Barbosa MD       [facility] (Including outside providers/suppliers regularly involved in providing care):   Patient Care Team:  Cindy Barbosa MD as PCP - Jeremiah Guerrero MD as PCP - [facility] Empaneled Provider  Orville Carrero MD as Consulting Physician (Urology)  Brenda Akins MD as Consulting Physician (Otolaryngology)     Reviewed and updated this visit:  Tobacco  Allergies  Meds  Problems  Med Hx  Surg Hx  Soc Hx  Fam Hx

## 2022-07-06 NOTE — PATIENT INSTRUCTIONS
Personalized Preventive Plan for Leia Nick - 7/6/2022  Medicare offers a range of preventive health benefits. Some of the tests and screenings are paid in full while other may be subject to a deductible, co-insurance, and/or copay. Some of these benefits include a comprehensive review of your medical history including lifestyle, illnesses that may run in your family, and various assessments and screenings as appropriate. After reviewing your medical record and screening and assessments performed today your provider may have ordered immunizations, labs, imaging, and/or referrals for you. A list of these orders (if applicable) as well as your Preventive Care list are included within your After Visit Summary for your review. Other Preventive Recommendations:    · A preventive eye exam performed by an eye specialist is recommended every 1-2 years to screen for glaucoma; cataracts, macular degeneration, and other eye disorders. · A preventive dental visit is recommended every 6 months. · Try to get at least 150 minutes of exercise per week or 10,000 steps per day on a pedometer . · Order or download the FREE \"Exercise & Physical Activity: Your Everyday Guide\" from [employer]. Call 9-886.916.2849 or search [employer] online. · You need 3338-1901 mg of calcium and 7020-8427 IU of vitamin D per day. It is possible to meet your calcium requirement with diet alone, but a vitamin D supplement is usually necessary to meet this goal.  · When exposed to the sun, use a sunscreen that protects against both UVA and UVB radiation with an SPF of 30 or greater. Reapply every 2 to 3 hours or after sweating, drying off with a towel, or swimming. · Always wear a seat belt when traveling in a car. Always wear a helmet when riding a bicycle or motorcycle.

## 2022-07-06 NOTE — ASSESSMENT & PLAN NOTE
Well-controlled, continue current medications and lifestyle modifications recommended diet and prn meds

## 2022-08-05 PROBLEM — Z00.00 WELL ADULT EXAM: Status: RESOLVED | Noted: 2018-12-24 | Resolved: 2022-08-05

## 2022-10-03 ENCOUNTER — HOSPITAL ENCOUNTER (OUTPATIENT)
Dept: CT IMAGING | Age: 72
Discharge: HOME | End: 2022-10-03
Payer: MEDICARE

## 2022-10-03 ENCOUNTER — OFFICE VISIT (OUTPATIENT)
Dept: INTERNAL MEDICINE CLINIC | Age: 72
End: 2022-10-03
Payer: MEDICARE

## 2022-10-03 VITALS
TEMPERATURE: 97.3 F | DIASTOLIC BLOOD PRESSURE: 74 MMHG | HEIGHT: 69 IN | BODY MASS INDEX: 22.07 KG/M2 | OXYGEN SATURATION: 98 % | SYSTOLIC BLOOD PRESSURE: 124 MMHG | WEIGHT: 149 LBS | HEART RATE: 85 BPM | RESPIRATION RATE: 16 BRPM

## 2022-10-03 DIAGNOSIS — K21.00 GASTROESOPHAGEAL REFLUX DISEASE WITH ESOPHAGITIS WITHOUT HEMORRHAGE: ICD-10-CM

## 2022-10-03 DIAGNOSIS — R07.1 CHEST PAIN ON BREATHING: ICD-10-CM

## 2022-10-03 DIAGNOSIS — R35.0 FREQUENT URINATION: ICD-10-CM

## 2022-10-03 DIAGNOSIS — C61 PROSTATE CANCER (HCC): ICD-10-CM

## 2022-10-03 DIAGNOSIS — R07.1 CHEST PAIN ON BREATHING: Primary | ICD-10-CM

## 2022-10-03 LAB
A/G RATIO: 1.9 (ref 1.1–2.2)
ALBUMIN SERPL-MCNC: 4.8 G/DL (ref 3.4–5)
ALP BLD-CCNC: 85 U/L (ref 40–129)
ALT SERPL-CCNC: 15 U/L (ref 10–40)
ANION GAP SERPL CALCULATED.3IONS-SCNC: 14 MMOL/L (ref 3–16)
AST SERPL-CCNC: 21 U/L (ref 15–37)
BASOPHILS ABSOLUTE: 0 K/UL (ref 0–0.2)
BASOPHILS RELATIVE PERCENT: 0.4 %
BILIRUB SERPL-MCNC: 0.8 MG/DL (ref 0–1)
BILIRUBIN, POC: NORMAL
BLOOD URINE, POC: NORMAL
BUN BLDV-MCNC: 21 MG/DL (ref 7–20)
CALCIUM SERPL-MCNC: 9.8 MG/DL (ref 8.3–10.6)
CHLORIDE BLD-SCNC: 97 MMOL/L (ref 99–110)
CLARITY, POC: CLEAR
CO2: 26 MMOL/L (ref 21–32)
COLOR, POC: YELLOW
CREAT SERPL-MCNC: 1.3 MG/DL (ref 0.8–1.3)
EOSINOPHILS ABSOLUTE: 0 K/UL (ref 0–0.6)
EOSINOPHILS RELATIVE PERCENT: 0.2 %
GFR AFRICAN AMERICAN: 60
GFR AFRICAN AMERICAN: >60
GFR NON-AFRICAN AMERICAN: 49
GFR NON-AFRICAN AMERICAN: 54
GLUCOSE BLD-MCNC: 101 MG/DL (ref 70–99)
GLUCOSE URINE, POC: NORMAL
HCT VFR BLD CALC: 39.4 % (ref 40.5–52.5)
HEMOGLOBIN: 13.2 G/DL (ref 13.5–17.5)
KETONES, POC: NORMAL
LEUKOCYTE EST, POC: NORMAL
LYMPHOCYTES ABSOLUTE: 0.7 K/UL (ref 1–5.1)
LYMPHOCYTES RELATIVE PERCENT: 8.9 %
MCH RBC QN AUTO: 31.1 PG (ref 26–34)
MCHC RBC AUTO-ENTMCNC: 33.5 G/DL (ref 31–36)
MCV RBC AUTO: 92.9 FL (ref 80–100)
MONOCYTES ABSOLUTE: 1.2 K/UL (ref 0–1.3)
MONOCYTES RELATIVE PERCENT: 15.7 %
NEUTROPHILS ABSOLUTE: 5.7 K/UL (ref 1.7–7.7)
NEUTROPHILS RELATIVE PERCENT: 74.8 %
NITRITE, POC: NORMAL
PDW BLD-RTO: 13 % (ref 12.4–15.4)
PERFORMED ON: ABNORMAL
PH, POC: 5
PLATELET # BLD: 210 K/UL (ref 135–450)
PMV BLD AUTO: 7.5 FL (ref 5–10.5)
POC CREATININE: 1.4 MG/DL (ref 0.8–1.3)
POC SAMPLE TYPE: ABNORMAL
POTASSIUM SERPL-SCNC: 4.4 MMOL/L (ref 3.5–5.1)
PROTEIN, POC: NORMAL
RBC # BLD: 4.25 M/UL (ref 4.2–5.9)
SODIUM BLD-SCNC: 137 MMOL/L (ref 136–145)
SPECIFIC GRAVITY, POC: 1.01
TOTAL PROTEIN: 7.3 G/DL (ref 6.4–8.2)
UROBILINOGEN, POC: 0.2
WBC # BLD: 7.6 K/UL (ref 4–11)

## 2022-10-03 PROCEDURE — 82565 ASSAY OF CREATININE: CPT

## 2022-10-03 PROCEDURE — 99215 OFFICE O/P EST HI 40 MIN: CPT | Performed by: INTERNAL MEDICINE

## 2022-10-03 PROCEDURE — 6360000004 HC RX CONTRAST MEDICATION: Performed by: INTERNAL MEDICINE

## 2022-10-03 PROCEDURE — 1123F ACP DISCUSS/DSCN MKR DOCD: CPT | Performed by: INTERNAL MEDICINE

## 2022-10-03 PROCEDURE — 71275 CT ANGIOGRAPHY CHEST: CPT

## 2022-10-03 PROCEDURE — 81002 URINALYSIS NONAUTO W/O SCOPE: CPT | Performed by: INTERNAL MEDICINE

## 2022-10-03 RX ORDER — TIZANIDINE 4 MG/1
TABLET ORAL
Qty: 10 TABLET | Refills: 0 | Status: SHIPPED | OUTPATIENT
Start: 2022-10-03 | End: 2022-10-17 | Stop reason: SDUPTHER

## 2022-10-03 RX ADMIN — IOPAMIDOL 80 ML: 755 INJECTION, SOLUTION INTRAVENOUS at 14:19

## 2022-10-03 ASSESSMENT — ENCOUNTER SYMPTOMS
ABDOMINAL PAIN: 0
EYE DISCHARGE: 0
WHEEZING: 0
BLOOD IN STOOL: 0
CHEST TIGHTNESS: 0
TROUBLE SWALLOWING: 0
EYE PAIN: 0
RHINORRHEA: 0
SINUS PRESSURE: 0
SINUS PAIN: 0
SHORTNESS OF BREATH: 1
NAUSEA: 0
SORE THROAT: 0
COUGH: 0
VOMITING: 0

## 2022-10-03 ASSESSMENT — PATIENT HEALTH QUESTIONNAIRE - PHQ9
SUM OF ALL RESPONSES TO PHQ QUESTIONS 1-9: 0
SUM OF ALL RESPONSES TO PHQ9 QUESTIONS 1 & 2: 0
2. FEELING DOWN, DEPRESSED OR HOPELESS: 0
SUM OF ALL RESPONSES TO PHQ QUESTIONS 1-9: 0
1. LITTLE INTEREST OR PLEASURE IN DOING THINGS: 0

## 2022-10-03 NOTE — PROGRESS NOTES
10/3/2022     Linh Nick (: 1950) is a 74 y.o. 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 He denies any leg pain or chest pain or palpitation or other complaints. Gastroesophageal Reflux Disease:    Watching gastroesophageal reflux diet. Taking medications as prescribed without any side effects. Working on trying to lose weight. Working on trying not to eat for 3 to 4 hours before lying down. Review of Systems   Constitutional:  Negative for appetite change, chills, fever and unexpected weight change. HENT:  Negative for congestion, ear discharge, ear pain, nosebleeds, rhinorrhea, sinus pressure, sinus pain, sore throat and trouble swallowing. Eyes:  Negative for pain and discharge. Respiratory:  Positive for shortness of breath (With deep breathing thoracic pain). Negative for cough, chest tightness and wheezing. Cardiovascular:  Negative for chest pain, palpitations and leg swelling. Gastrointestinal:  Negative for abdominal pain, blood in stool, nausea and vomiting. Endocrine: Negative for polydipsia and polyphagia.    Genitourinary:  Negative for difficulty urinating, enuresis, flank pain and hematuria. Musculoskeletal:  Negative for myalgias. Thoracic t7-10 are and r post chest pain   Skin:  Negative for rash. Neurological:  Negative for facial asymmetry, weakness, light-headedness, numbness and headaches. Psychiatric/Behavioral:  Negative for confusion. Current Outpatient Medications on File Prior to Visit   Medication Sig Dispense Refill    latanoprost (XALATAN) 0.005 % ophthalmic solution Place 1 drop into the right eye nightly      omeprazole (PRILOSEC) 20 MG capsule TAKE 1 CAPSULE DAILY 90 capsule 0     No current facility-administered medications on file prior to visit. Past Medical History:   Diagnosis Date    Cancer [facility])     prostate Dr Aidan Barron s/p rads with cyber knife     Dupuytrens contracture     Foot pain, left     GERD (gastroesophageal reflux disease)     Migraine     Mole (skin)     Nocturia     Reflux esophagitis     Shoulder pain     Sinusitis       Social History     Tobacco Use    Smoking status: Former     Packs/day: 1.00     Years: 10.00     Pack years: 10.00     Types: Cigarettes     Quit date: 1980     Years since quittin.7    Smokeless tobacco: Never   Substance Use Topics    Alcohol use: Yes     Alcohol/week: 0.0 standard drinks     Comment: occ      Family History   Problem Relation Age of Onset    Cancer Mother     Stroke Brother         Vitals:    10/03/22 1137   BP: 124/74   Site: Left Upper Arm   Position: Sitting   Cuff Size: Large Adult   Pulse: 85   Resp: 16   Temp: 97.3 °F (36.3 °C)   TempSrc: Temporal   SpO2: 98%   Weight: 149 lb (67.6 kg)   Height: 5' 9\" (1.753 m)     Estimated body mass index is 22 kg/m² as calculated from the following:    Height as of this encounter: 5' 9\" (1.753 m). Weight as of this encounter: 149 lb (67.6 kg). Physical Exam  Vitals and nursing note reviewed. Constitutional:       General: He is not in acute distress. HENT:      Head: Normocephalic and atraumatic. Right Ear: Tympanic membrane, ear canal and external ear normal.      Left Ear: Tympanic membrane, ear canal and external ear normal.      Nose: Nose normal.   Eyes:      General: Lids are normal.      Conjunctiva/sclera: Conjunctivae normal.      Pupils: Pupils are equal, round, and reactive to light. Neck:      Thyroid: No thyromegaly. Vascular: No JVD. Trachea: No tracheal deviation. Cardiovascular:      Rate and Rhythm: Normal rate and regular rhythm. Heart sounds: Normal heart sounds. No gallop. Pulmonary:      Effort: Pulmonary effort is normal. No respiratory distress. Breath sounds: Normal breath sounds. No wheezing or rales. Abdominal:      General: Bowel sounds are normal.      Palpations: Abdomen is soft. There is no mass. Tenderness: There is no abdominal tenderness. Musculoskeletal:         General: No tenderness. Cervical back: Neck supple. Comments: No leg edema or calf tenderness    Right side and thoracic area and the rib. Pain posterior T7-T10 area and not any clear-cut tender points felt and may be 1 area and slight discomfort on palpation   Lymphadenopathy:      Cervical: No cervical adenopathy. Skin:     General: Skin is warm and dry. Findings: No rash. Neurological:      General: No focal deficit present. Mental Status: He is alert and oriented to person, place, and time. Cranial Nerves: No cranial nerve deficit. Sensory: No sensory deficit. Psychiatric:         Mood and Affect: Mood normal.         Behavior: Behavior normal.         Thought Content: Thought content normal.         Judgment: Judgment normal.       ASSESSMENT/PLAN:  1. Chest pain on breathing    - CTA PULMONARY W CONTRAST; Future Stat-calling the [facility] [employer] to schedule her right away the CAT scan and blood work is going to do the stat also and we follow-up the blood test results and CAT scan later on today. And decide on treatment after that.   - CBC with Auto Differential; Future  - Comprehensive Metabolic Panel; Future    2. Frequent urination    - POCT Urinalysis no Micro    3. Prostate cancer [facility])  Cancer specialist follow-up    4. Gastroesophageal reflux disease with esophagitis without hemorrhage  Medication    Return if symptoms worsen or fail to improve. Patient Instructions   Blood test now    Ct chest now           Electronically signed by Randell Pérez MD on 10/3/2022 at 12:34 PM     This dictation was generated by voice recognition computer software. Although all attempts are made to edit the dictation for accuracy, there may be errors in the transcription that are not intended.

## 2022-10-05 LAB — PSA, TOTAL: 42.25 NG/ML (ref 0–4)

## 2022-10-13 DIAGNOSIS — R07.1 CHEST PAIN ON BREATHING: ICD-10-CM

## 2022-10-13 NOTE — TELEPHONE ENCOUNTER
If this is the same symptoms that he saw Dr Yaquelin King  would cont meds and send for PT  or is this a new back issue

## 2022-10-13 NOTE — TELEPHONE ENCOUNTER
Patient needs refill and also his back isn't getting any better and wants to possibly get referral for an orthopaedic surgeon        Refill      tiZANidine (Young Vázquez) 4 MG tablet       [facility] 21 [id number] Lauren Celeste[address], [address] [place] [id number]   Phone:  [phone]  Fax:  [phone]

## 2022-10-14 NOTE — TELEPHONE ENCOUNTER
As noted PSA concerns as per Dr Meena Lovell as far as pain goes could consider bone scan to R/O mets from prostate CA

## 2022-10-14 NOTE — TELEPHONE ENCOUNTER
Pt called back states medication helped slightly especially at night. Pt would like to know where the source is coming from prior to getting Physical therapy. Would like the refill of Tizanidine and exercises, reference from Orthopedic. Also please review PSA results, pt states  Dr. Chivo Coleman didn't seem concerned but prior to testing started with back pain, not sure if this is related or not. Per Dr. Wolf Swain ok to fill Tizanidine and ROM exercises. Patient advised and script will be sent.

## 2022-10-17 RX ORDER — TIZANIDINE 4 MG/1
TABLET ORAL
Qty: 10 TABLET | Refills: 0 | Status: SHIPPED | OUTPATIENT
Start: 2022-10-17

## 2022-10-18 ENCOUNTER — OFFICE VISIT (OUTPATIENT)
Dept: INTERNAL MEDICINE CLINIC | Age: 72
End: 2022-10-18
Payer: MEDICARE

## 2022-10-18 VITALS
HEIGHT: 69 IN | BODY MASS INDEX: 22.22 KG/M2 | WEIGHT: 150 LBS | SYSTOLIC BLOOD PRESSURE: 130 MMHG | DIASTOLIC BLOOD PRESSURE: 70 MMHG | TEMPERATURE: 97.7 F | HEART RATE: 73 BPM | OXYGEN SATURATION: 98 %

## 2022-10-18 DIAGNOSIS — M54.6 ACUTE RIGHT-SIDED THORACIC BACK PAIN: ICD-10-CM

## 2022-10-18 PROBLEM — R07.89 MUSCULAR CHEST PAIN: Status: ACTIVE | Noted: 2022-10-18

## 2022-10-18 PROCEDURE — 99213 OFFICE O/P EST LOW 20 MIN: CPT | Performed by: INTERNAL MEDICINE

## 2022-10-18 PROCEDURE — 1123F ACP DISCUSS/DSCN MKR DOCD: CPT | Performed by: INTERNAL MEDICINE

## 2022-10-18 ASSESSMENT — ENCOUNTER SYMPTOMS
SHORTNESS OF BREATH: 0
SINUS PAIN: 0
CHEST TIGHTNESS: 0
BLOOD IN STOOL: 0
SORE THROAT: 0
WHEEZING: 0
SINUS PRESSURE: 0
COUGH: 0
TROUBLE SWALLOWING: 0
VOMITING: 0
EYE DISCHARGE: 0
RHINORRHEA: 0
ABDOMINAL PAIN: 0
EYE PAIN: 0
NAUSEA: 0

## 2022-10-18 ASSESSMENT — PATIENT HEALTH QUESTIONNAIRE - PHQ9
SUM OF ALL RESPONSES TO PHQ QUESTIONS 1-9: 0
SUM OF ALL RESPONSES TO PHQ9 QUESTIONS 1 & 2: 0
1. LITTLE INTEREST OR PLEASURE IN DOING THINGS: 0
SUM OF ALL RESPONSES TO PHQ QUESTIONS 1-9: 0
2. FEELING DOWN, DEPRESSED OR HOPELESS: 0

## 2022-10-18 NOTE — PROGRESS NOTES
10/18/2022     Elmer Johnson (: 1950) is a 74 y.o. male, here for evaluation of the following medical concerns:    Chief Complaint   Patient presents with    Back Pain     Follow-up on back pain         Back pain--Tizanidine helped and he could take the whole tizanidine and did not made him sleepy and now the pharmacy issue for 3 days he has not had his tizanidine. 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 Psa elevated--Pet scan this  November--does not want chemo unless the cancer is metastatic       Review of Systems   Constitutional:  Negative for appetite change, chills, fever and unexpected weight change. HENT:  Negative for congestion, ear discharge, ear pain, nosebleeds, rhinorrhea, sinus pressure, sinus pain, sore throat and trouble swallowing. Eyes:  Negative for pain and discharge. Respiratory:  Negative for cough, chest tightness, shortness of breath and wheezing. Cardiovascular:  Negative for chest pain, palpitations and leg swelling. Gastrointestinal:  Negative for abdominal pain, blood in stool, nausea and vomiting. Endocrine: Negative for polydipsia and polyphagia. Genitourinary:  Negative for difficulty urinating, enuresis, flank pain and hematuria. Musculoskeletal:  Negative for myalgias. Skin:  Negative for rash. Neurological:  Negative for facial asymmetry, weakness, light-headedness, numbness and headaches. Psychiatric/Behavioral:  Negative for confusion. Current Outpatient Medications on File Prior to Visit   Medication Sig Dispense Refill    tiZANidine (ZANAFLEX) 4 MG tablet Take orally half tablet at bedtime as needed and watch drowsiness next day.  10 tablet 0    latanoprost (XALATAN) 0.005 % ophthalmic solution Place 1 drop into the right eye nightly      omeprazole (PRILOSEC) 20 MG capsule TAKE 1 CAPSULE DAILY 90 capsule 0     No current facility-administered medications on file prior to visit. Past Medical History:   Diagnosis Date    Cancer [facility]) 2014    prostate Dr Bailey Potter s/p rads with cyber knife     Dupuytrens contracture     Foot pain, left     GERD (gastroesophageal reflux disease)     Migraine     Mole (skin)     Nocturia     Reflux esophagitis     Shoulder pain     Sinusitis       Social History     Tobacco Use    Smoking status: Former     Packs/day: 1.00     Years: 10.00     Pack years: 10.00     Types: Cigarettes     Quit date: 1980     Years since quittin.8    Smokeless tobacco: Never   Substance Use Topics    Alcohol use: Yes     Alcohol/week: 0.0 standard drinks     Comment: occ      Family History   Problem Relation Age of Onset    Cancer Mother     Stroke Brother         Vitals:    10/18/22 1619   BP: 130/70   Site: Left Upper Arm   Position: Sitting   Cuff Size: Large Adult   Pulse: 73   Temp: 97.7 °F (36.5 °C)   TempSrc: Temporal   SpO2: 98%   Weight: 150 lb (68 kg)   Height: 5' 9\" (1.753 m)     Estimated body mass index is 22.15 kg/m² as calculated from the following:    Height as of this encounter: 5' 9\" (1.753 m). Weight as of this encounter: 150 lb (68 kg). Physical Exam  Vitals and nursing note reviewed. Constitutional:       General: He is not in acute distress. HENT:      Head: Normocephalic and atraumatic. Right Ear: External ear normal.      Left Ear: External ear normal.   Eyes:      General: Lids are normal.      Conjunctiva/sclera: Conjunctivae normal.      Pupils: Pupils are equal, round, and reactive to light. Neck:      Thyroid: No thyromegaly. Vascular: No JVD. Trachea: No tracheal deviation. Cardiovascular:      Rate and Rhythm: Normal rate and regular rhythm. Heart sounds: Normal heart sounds. No gallop. Pulmonary:      Effort: Pulmonary effort is normal. No respiratory distress. Breath sounds: Normal breath sounds.  No wheezing or rales. Abdominal:      General: Bowel sounds are normal.      Palpations: Abdomen is soft. There is no mass. Tenderness: There is no abdominal tenderness. Musculoskeletal:         General: No tenderness. Cervical back: Neck supple. Comments: No leg edema or calf tenderness    R thoracic muscle area getting better   Lymphadenopathy:      Cervical: No cervical adenopathy. Skin:     General: Skin is warm and dry. Findings: No rash. Neurological:      General: No focal deficit present. Mental Status: He is alert and oriented to person, place, and time. Cranial Nerves: No cranial nerve deficit. Sensory: No sensory deficit. Psychiatric:         Mood and Affect: Mood normal.         Behavior: Behavior normal.         Thought Content: Thought content normal.       ASSESSMENT/PLAN:  1. Acute right-sided thoracic back pain  Avoid strain and tizanidine as needed and check on PET scan in November Return if symptoms worsen or fail to improve. Patient Instructions   No straining exercise to right back    Tizanidine as needed. Electronically signed by Celeste David MD on 10/18/2022 at 5:06 PM     This dictation was generated by voice recognition computer software. Although all attempts are made to edit the dictation for accuracy, there may be errors in the transcription that are not intended.

## 2022-11-10 ENCOUNTER — TELEPHONE (OUTPATIENT)
Dept: INTERNAL MEDICINE CLINIC | Age: 72
End: 2022-11-10

## 2022-11-10 NOTE — TELEPHONE ENCOUNTER
Nurse Triage Call:    Chest pains and shortness of breath ongoing x 1 month under armpit on left side near ribs off & on.     Appt scheduled on 11/11/22

## 2022-11-11 ENCOUNTER — OFFICE VISIT (OUTPATIENT)
Dept: INTERNAL MEDICINE CLINIC | Age: 72
End: 2022-11-11
Payer: MEDICARE

## 2022-11-11 VITALS
BODY MASS INDEX: 22.25 KG/M2 | TEMPERATURE: 97.5 F | HEIGHT: 69 IN | DIASTOLIC BLOOD PRESSURE: 70 MMHG | SYSTOLIC BLOOD PRESSURE: 128 MMHG | HEART RATE: 72 BPM | OXYGEN SATURATION: 99 % | WEIGHT: 150.2 LBS

## 2022-11-11 DIAGNOSIS — C61 PROSTATE CANCER (HCC): ICD-10-CM

## 2022-11-11 DIAGNOSIS — R07.9 LEFT-SIDED CHEST PAIN: ICD-10-CM

## 2022-11-11 DIAGNOSIS — S29.011A MUSCLE STRAIN OF CHEST WALL, INITIAL ENCOUNTER: Primary | ICD-10-CM

## 2022-11-11 PROBLEM — M54.6 ACUTE RIGHT-SIDED THORACIC BACK PAIN: Status: RESOLVED | Noted: 2022-01-01 | Resolved: 2022-01-01

## 2022-11-11 LAB
A/G RATIO: 2 (ref 1.1–2.2)
ALBUMIN SERPL-MCNC: 4.4 G/DL (ref 3.4–5)
ALP BLD-CCNC: 93 U/L (ref 40–129)
ALT SERPL-CCNC: 23 U/L (ref 10–40)
ANION GAP SERPL CALCULATED.3IONS-SCNC: 11 MMOL/L (ref 3–16)
AST SERPL-CCNC: 24 U/L (ref 15–37)
BASOPHILS ABSOLUTE: 0 K/UL (ref 0–0.2)
BASOPHILS RELATIVE PERCENT: 0.7 %
BILIRUB SERPL-MCNC: <0.2 MG/DL (ref 0–1)
BILIRUBIN URINE: NEGATIVE
BLOOD, URINE: NEGATIVE
BUN BLDV-MCNC: 20 MG/DL (ref 7–20)
CALCIUM SERPL-MCNC: 9.6 MG/DL (ref 8.3–10.6)
CHLORIDE BLD-SCNC: 102 MMOL/L (ref 99–110)
CLARITY: CLEAR
CO2: 27 MMOL/L (ref 21–32)
COLOR: YELLOW
CREAT SERPL-MCNC: 1.4 MG/DL (ref 0.8–1.3)
EOSINOPHILS ABSOLUTE: 0.1 K/UL (ref 0–0.6)
EOSINOPHILS RELATIVE PERCENT: 1 %
GFR SERPL CREATININE-BSD FRML MDRD: 52 ML/MIN/{1.73_M2}
GLUCOSE BLD-MCNC: 96 MG/DL (ref 70–99)
GLUCOSE URINE: NEGATIVE MG/DL
HCT VFR BLD CALC: 39 % (ref 40.5–52.5)
HEMOGLOBIN: 13.2 G/DL (ref 13.5–17.5)
KETONES, URINE: NEGATIVE MG/DL
LEUKOCYTE ESTERASE, URINE: NEGATIVE
LYMPHOCYTES ABSOLUTE: 0.9 K/UL (ref 1–5.1)
LYMPHOCYTES RELATIVE PERCENT: 16.7 %
MCH RBC QN AUTO: 30.8 PG (ref 26–34)
MCHC RBC AUTO-ENTMCNC: 33.7 G/DL (ref 31–36)
MCV RBC AUTO: 91.5 FL (ref 80–100)
MICROSCOPIC EXAMINATION: NORMAL
MONOCYTES ABSOLUTE: 0.7 K/UL (ref 0–1.3)
MONOCYTES RELATIVE PERCENT: 12.1 %
NEUTROPHILS ABSOLUTE: 3.9 K/UL (ref 1.7–7.7)
NEUTROPHILS RELATIVE PERCENT: 69.5 %
NITRITE, URINE: NEGATIVE
PDW BLD-RTO: 12.9 % (ref 12.4–15.4)
PH UA: 6.5 (ref 5–8)
PLATELET # BLD: 208 K/UL (ref 135–450)
PMV BLD AUTO: 7.5 FL (ref 5–10.5)
POTASSIUM SERPL-SCNC: 4.8 MMOL/L (ref 3.5–5.1)
PROTEIN UA: NEGATIVE MG/DL
RBC # BLD: 4.27 M/UL (ref 4.2–5.9)
SODIUM BLD-SCNC: 140 MMOL/L (ref 136–145)
SPECIFIC GRAVITY UA: 1.02 (ref 1–1.03)
TOTAL PROTEIN: 6.6 G/DL (ref 6.4–8.2)
TROPONIN: <0.01 NG/ML
URINE REFLEX TO CULTURE: NORMAL
URINE TYPE: NORMAL
UROBILINOGEN, URINE: 0.2 E.U./DL
WBC # BLD: 5.7 K/UL (ref 4–11)

## 2022-11-11 PROCEDURE — 99214 OFFICE O/P EST MOD 30 MIN: CPT | Performed by: INTERNAL MEDICINE

## 2022-11-11 PROCEDURE — 1123F ACP DISCUSS/DSCN MKR DOCD: CPT | Performed by: INTERNAL MEDICINE

## 2022-11-11 RX ORDER — TIZANIDINE 4 MG/1
TABLET ORAL
Qty: 10 TABLET | Refills: 0 | Status: SHIPPED | OUTPATIENT
Start: 2022-11-11

## 2022-11-11 RX ORDER — TIZANIDINE 4 MG/1
TABLET ORAL
Qty: 10 TABLET | Refills: 0 | Status: SHIPPED | OUTPATIENT
Start: 2022-11-11 | End: 2022-11-11

## 2022-11-11 ASSESSMENT — ENCOUNTER SYMPTOMS
RHINORRHEA: 0
SINUS PAIN: 0
BLOOD IN STOOL: 0
COUGH: 0
WHEEZING: 0
ABDOMINAL PAIN: 0
EYE PAIN: 0
VOMITING: 0
CHEST TIGHTNESS: 0
NAUSEA: 0
SHORTNESS OF BREATH: 0
SINUS PRESSURE: 0
TROUBLE SWALLOWING: 0
EYE DISCHARGE: 0
SORE THROAT: 0

## 2022-11-11 ASSESSMENT — PATIENT HEALTH QUESTIONNAIRE - PHQ9
SUM OF ALL RESPONSES TO PHQ QUESTIONS 1-9: 0
SUM OF ALL RESPONSES TO PHQ QUESTIONS 1-9: 0
1. LITTLE INTEREST OR PLEASURE IN DOING THINGS: 0
SUM OF ALL RESPONSES TO PHQ QUESTIONS 1-9: 0
2. FEELING DOWN, DEPRESSED OR HOPELESS: 0
SUM OF ALL RESPONSES TO PHQ QUESTIONS 1-9: 0
SUM OF ALL RESPONSES TO PHQ9 QUESTIONS 1 & 2: 0

## 2022-11-11 NOTE — PROGRESS NOTES
2022     Ravinder Ha (: 1950) is a 74 y.o. male, here for evaluation of the following medical concerns:    Chief Complaint   Patient presents with    Shortness of Breath     1 month    Flank Pain     Left side x 1 month        Pain L lower rib cage --shifting to L arm pit at times. His right side thoracic pain is completely gone now and he has been doing a lot of stretching exercises and ball in the leg holding and then set ups and also doing bicycle with arms and legs as well as elliptical machine with arm and leg and probably 1 of those exercises strained his rib cage on the left side now as we have CT scan chest last 1 was unremarkable and he does not have any cough or fever chills or nausea vomiting diarrhea and labs noted from previous month and denies any chest pain. Other than local rib and muscle pain    Dehydration and chronic kidney disease explained to him at length and he is working on drinking enough water and we will recheck blood and urine test right now. He denies any urinary symptoms. Denies any ENT symptoms. PET scan for Prostate--rescheduled       Review of Systems   Constitutional:  Negative for appetite change, chills, fever and unexpected weight change. HENT:  Negative for congestion, ear discharge, ear pain, nosebleeds, rhinorrhea, sinus pressure, sinus pain, sore throat and trouble swallowing. Eyes:  Negative for pain and discharge. Respiratory:  Negative for cough, chest tightness, shortness of breath and wheezing. Cardiovascular:  Positive for chest pain. Negative for palpitations and leg swelling. Gastrointestinal:  Negative for abdominal pain, blood in stool, nausea and vomiting. Endocrine: Negative for polydipsia and polyphagia. Genitourinary:  Negative for difficulty urinating, enuresis, flank pain and hematuria. Musculoskeletal:  Negative for myalgias. Skin:  Negative for rash.    Neurological:  Negative for facial asymmetry, weakness, light-headedness, numbness and headaches. Psychiatric/Behavioral:  Negative for confusion. Current Outpatient Medications on File Prior to Visit   Medication Sig Dispense Refill    latanoprost (XALATAN) 0.005 % ophthalmic solution Place 1 drop into the right eye nightly      omeprazole (PRILOSEC) 20 MG capsule TAKE 1 CAPSULE DAILY 90 capsule 0     No current facility-administered medications on file prior to visit. Past Medical History:   Diagnosis Date    Cancer [facility]) 2014    prostate Dr Chivo Coleman s/p rads with cyber knife     Dupuytrens contracture     Foot pain, left     GERD (gastroesophageal reflux disease)     Migraine     Mole (skin)     Nocturia     Reflux esophagitis     Shoulder pain     Sinusitis       Social History     Tobacco Use    Smoking status: Former     Packs/day: 1.00     Years: 10.00     Pack years: 10.00     Types: Cigarettes     Quit date: 1980     Years since quittin.8    Smokeless tobacco: Never   Substance Use Topics    Alcohol use: Yes     Alcohol/week: 0.0 standard drinks     Comment: occ      Family History   Problem Relation Age of Onset    Cancer Mother     Stroke Brother         Vitals:    22 0946   BP: 128/70   Site: Right Upper Arm   Position: Sitting   Pulse: 72   Temp: 97.5 °F (36.4 °C)   SpO2: 99%   Weight: 150 lb 3.2 oz (68.1 kg)   Height: 5' 9\" (1.753 m)     Estimated body mass index is 22.18 kg/m² as calculated from the following:    Height as of this encounter: 5' 9\" (1.753 m). Weight as of this encounter: 150 lb 3.2 oz (68.1 kg). Physical Exam  Vitals and nursing note reviewed. Constitutional:       General: He is not in acute distress. HENT:      Head: Normocephalic and atraumatic. Right Ear: External ear normal.      Left Ear: External ear normal.   Eyes:      General: Lids are normal.      Conjunctiva/sclera: Conjunctivae normal.      Pupils: Pupils are equal, round, and reactive to light.    Neck:      Thyroid: No thyromegaly. Vascular: No JVD. Trachea: No tracheal deviation. Cardiovascular:      Rate and Rhythm: Normal rate and regular rhythm. Heart sounds: Normal heart sounds. No gallop. Pulmonary:      Effort: Pulmonary effort is normal. No respiratory distress. Breath sounds: Normal breath sounds. No wheezing or rales. Abdominal:      General: Bowel sounds are normal.      Palpations: Abdomen is soft. There is no mass. Tenderness: There is no abdominal tenderness. Musculoskeletal:         General: No tenderness. Cervical back: Neck supple. Comments: No leg edema or calf tenderness    Tender muscles on the left mid back-t10-t 12 area-asked him to watch for the rash as so far no rash noted. Lymphadenopathy:      Cervical: No cervical adenopathy. Skin:     General: Skin is warm and dry. Findings: No rash. Neurological:      Mental Status: He is alert and oriented to person, place, and time. Cranial Nerves: No cranial nerve deficit. Sensory: No sensory deficit. Psychiatric:         Behavior: Behavior normal.         Thought Content: Thought content normal.       ASSESSMENT/PLAN:  1. Muscle strain of chest wall, initial encounter    - tiZANidine (ZANAFLEX) 4 MG tablet; Take orally half tablet at bedtime as needed and watch drowsiness next day. Dispense: 10 tablet; Refill: 0    2. Left-sided chest pain    - CBC with Auto Differential; Future  - Comprehensive Metabolic Panel; Future  - Urinalysis with Reflex to Culture; Future  - Troponin; Future    3. Prostate cancer [facility])  Urology f/u    Return in about 27 days (around 12/8/2022) for Dr Lima Mckeon f/u. Patient Instructions   Blood and urine test right now. Keep PET scan scheduled    Make sure drinking 64 ounce water from 8 AM to 6 PM.    Avoid straining to the left chest wall muscle    Local aspercream massage  as needed. Tizanidine as needed. F/u with Dr Lima Mckeon sooner if needed.     Discussed use, benefit, and side effects of prescribed medications. Barriers to compliance discussed. All patient questions answered. Pt voiced understanding. IF YOU NEED A PRESCRIPTION REFILL, THEN PLEASE GIVE US THREE WORKING DAYS TO REFILL A PRESCRIPTION. May go to urgent care or Emergency room or call to be seen in the office sooner than scheduled follow-up appointment,if condition worsens. Electronically signed by Josiah Tesfaye MD on 11/11/2022 at 10:16 AM     This dictation was generated by voice recognition computer software. Although all attempts are made to edit the dictation for accuracy, there may be errors in the transcription that are not intended.

## 2022-11-11 NOTE — PATIENT INSTRUCTIONS
Blood and urine test right now. Keep PET scan scheduled    Make sure drinking 64 ounce water from 8 AM to 6 PM.    Avoid straining to the left chest wall muscle    Local aspercream massage  as needed. Tizanidine as needed. F/u with Dr Jean Claude Restrepo sooner if needed. Discussed use, benefit, and side effects of prescribed medications. Barriers to compliance discussed. All patient questions answered. Pt voiced understanding. IF YOU NEED A PRESCRIPTION REFILL, THEN PLEASE GIVE US THREE WORKING DAYS TO REFILL A PRESCRIPTION. May go to urgent care or Emergency room or call to be seen in the office sooner than scheduled follow-up appointment,if condition worsens.

## 2022-11-14 ENCOUNTER — TELEPHONE (OUTPATIENT)
Dept: INTERNAL MEDICINE CLINIC | Age: 72
End: 2022-11-14

## 2022-12-15 LAB
ALBUMIN SERPL-MCNC: 4.6 G/DL (ref 3.5–5.2)
ALP BLD-CCNC: 95 U/L (ref 53–128)
ALT SERPL-CCNC: 39 U/L (ref 0–45)
AST SERPL-CCNC: 30 U/L (ref 5–34)
BILIRUB SERPL-MCNC: 0.9 MG/DL (ref 0.1–1.2)
BUN / CREAT RATIO: 16.2 RATIO (ref 9–28)
BUN BLDV-MCNC: 21 MG/DL (ref 6–20)
CALCIUM SERPL-MCNC: 9.9 MG/DL (ref 8.6–10.3)
CHLORIDE BLD-SCNC: 98 MMOL/L (ref 97–107)
CO2: 31 MMOL/L (ref 21–31)
CREAT SERPL-MCNC: 1.3 MG/DL (ref 0.7–1.2)
GLUCOSE: 100 MG/DL (ref 74–106)
POTASSIUM SERPL-SCNC: 4.5 MMOL/L (ref 3.6–5)
PSA, TOTAL: 0.78 NG/ML (ref 0–4)
SODIUM BLD-SCNC: 138 MMOL/L (ref 135–145)
TOTAL PROTEIN: 7.2 G/DL (ref 6.4–8.3)

## 2023-01-03 ENCOUNTER — TELEPHONE (OUTPATIENT)
Dept: INTERNAL MEDICINE CLINIC | Age: 73
End: 2023-01-03

## 2023-01-03 NOTE — TELEPHONE ENCOUNTER
Patient called, started feeling bad 12/29/22. Just tested for covid and it is positive. Symptoms are:  headache, cough, stuffy nose, chills. He is using a nasal spray and congestion medication. He is asking for Paxlovid if doctors believes it appropriate. Dr. Idalia Huang will be his new provider.     [facility] [id number] Noemi Adam, [address] [facility] [place] [address] [phone]      Please call and advise

## 2023-01-03 NOTE — TELEPHONE ENCOUNTER
We are now past the window for paxlovid (within 5 days of symp onset). Continue with supportive tx as he is doing.

## 2023-01-11 ENCOUNTER — OFFICE VISIT (OUTPATIENT)
Dept: INTERNAL MEDICINE CLINIC | Age: 73
End: 2023-01-11
Payer: MEDICARE

## 2023-01-11 VITALS
WEIGHT: 148.8 LBS | HEIGHT: 69 IN | TEMPERATURE: 97.4 F | OXYGEN SATURATION: 98 % | DIASTOLIC BLOOD PRESSURE: 60 MMHG | BODY MASS INDEX: 22.04 KG/M2 | SYSTOLIC BLOOD PRESSURE: 130 MMHG | HEART RATE: 87 BPM

## 2023-01-11 DIAGNOSIS — Z13.220 LIPID SCREENING: ICD-10-CM

## 2023-01-11 DIAGNOSIS — N18.31 STAGE 3A CHRONIC KIDNEY DISEASE (HCC): ICD-10-CM

## 2023-01-11 DIAGNOSIS — C61 PROSTATE CANCER (HCC): ICD-10-CM

## 2023-01-11 DIAGNOSIS — G43.709 CHRONIC MIGRAINE WITHOUT AURA WITHOUT STATUS MIGRAINOSUS, NOT INTRACTABLE: ICD-10-CM

## 2023-01-11 DIAGNOSIS — Z13.1 DIABETES MELLITUS SCREENING: Primary | ICD-10-CM

## 2023-01-11 DIAGNOSIS — K21.00 GASTROESOPHAGEAL REFLUX DISEASE WITH ESOPHAGITIS WITHOUT HEMORRHAGE: ICD-10-CM

## 2023-01-11 PROBLEM — R42 VERTIGO: Status: RESOLVED | Noted: 2018-05-14 | Resolved: 2023-01-11

## 2023-01-11 PROBLEM — R35.0 FREQUENT URINATION: Status: RESOLVED | Noted: 2022-10-03 | Resolved: 2023-01-11

## 2023-01-11 PROBLEM — S29.011A CHEST WALL MUSCLE STRAIN: Status: RESOLVED | Noted: 2022-11-11 | Resolved: 2023-01-11

## 2023-01-11 PROBLEM — R07.89 MUSCULAR CHEST PAIN: Status: RESOLVED | Noted: 2022-10-18 | Resolved: 2023-01-11

## 2023-01-11 PROBLEM — F41.9 ANXIETY: Status: RESOLVED | Noted: 2018-10-08 | Resolved: 2023-01-11

## 2023-01-11 PROBLEM — R07.9 LEFT-SIDED CHEST PAIN: Status: RESOLVED | Noted: 2022-11-11 | Resolved: 2023-01-11

## 2023-01-11 PROBLEM — M62.838 TRAPEZIUS MUSCLE SPASM: Status: RESOLVED | Noted: 2021-03-23 | Resolved: 2023-01-11

## 2023-01-11 PROBLEM — R07.1 CHEST PAIN ON BREATHING: Status: RESOLVED | Noted: 2022-10-03 | Resolved: 2023-01-11

## 2023-01-11 PROCEDURE — 1123F ACP DISCUSS/DSCN MKR DOCD: CPT | Performed by: INTERNAL MEDICINE

## 2023-01-11 PROCEDURE — 99214 OFFICE O/P EST MOD 30 MIN: CPT | Performed by: INTERNAL MEDICINE

## 2023-01-11 RX ORDER — ABIRATERONE ACETATE 250 MG/1
TABLET ORAL
COMMUNITY
Start: 2022-12-30

## 2023-01-11 RX ORDER — PREDNISONE 1 MG/1
TABLET ORAL EVERY MORNING
COMMUNITY
Start: 2022-12-30

## 2023-01-11 ASSESSMENT — PATIENT HEALTH QUESTIONNAIRE - PHQ9
SUM OF ALL RESPONSES TO PHQ9 QUESTIONS 1 & 2: 0
SUM OF ALL RESPONSES TO PHQ QUESTIONS 1-9: 0
1. LITTLE INTEREST OR PLEASURE IN DOING THINGS: 0
SUM OF ALL RESPONSES TO PHQ QUESTIONS 1-9: 0
2. FEELING DOWN, DEPRESSED OR HOPELESS: 0

## 2023-01-11 ASSESSMENT — ENCOUNTER SYMPTOMS: SHORTNESS OF BREATH: 0

## 2023-01-11 NOTE — PROGRESS NOTES
[facility] Internal Medicine  Establish care visit   2023    Elly Rankin (:  1950) virgen 74 y.o. male, here to establish care. Chief Complaint   Patient presents with    Establish Care     Former pt of Dr. Bereket Campbell        Patient Active Problem List   Diagnosis    Reflux esophagitis    Migraine    Prostate cancer ([facility] [address]    Allergic sinusitis    Dupuytren's contracture of right hand    Sciatica of left side    Stage 3a chronic kidney disease ([facility] [address]       ASSESSMENT/ PLAN:    Reflux esophagitis  -had been on PPI for ~20y. -will continue for now given now on streoid but we might want to consider to try wean off in the future (especially given CKD)    Stage 3a chronic kidney disease ([facility] [address]  -baseline GFR 50s  -avoid NASIDs  -stay well hydrated  -will want to reevaluate PPI if possible down the road     Migraine  -rare HA, with aura. Likely suffers from ocular migraines (q6 months). Not on any treatment    Prostate cancer [facility])  - S/p CyberKnife   -Course of radiation a few years ago for ?hip lesion  -Recently completed a second course of radiation for spinal mets on recent PET  -Currently on abiraterone and prednisone  -Strong family history of breast cancer (mother, grandmother, male cousin, all at young age). Reportedly had genetic testing done by urology 2 weeks ago, awaiting records.  -Now on close PSA follow-up      Orders Placed This Encounter   Procedures    LIPID PANEL    Comprehensive Metabolic Panel    CBC with Auto Differential    Glucose, Fasting     No orders of the defined types were placed in this encounter. Medications Discontinued During This Encounter   Medication Reason    tiZANidine (ZANAFLEX) 4 MG tablet Therapy completed        No follow-ups on file. HPI  74years old man with history of metastatic prostate cancer, CKD stage III, GERD, migraines, establishing care. Prior patient of Dr. Bereket Campbell who retired.   Diagnosed with prostate cancer around , status post CyberKnife treatment and radiation in the past.  Used to see Dr. Wilfredo Eng and now follows with Dr. Des Gillespie. Most recently found to have spinal mets and completed another course of radiation. Currently on abiraterone and prednisone. Reports he was genetically tested 2 weeks ago in urology office, awaiting results. HISTORIES  Current Outpatient Medications on File Prior to Visit   Medication Sig Dispense Refill    abiraterone acetate (ZYTIGA) 250 MG tablet 4 tablets in the AM      predniSONE (DELTASONE) 5 MG tablet every morning      latanoprost (XALATAN) 0.005 % ophthalmic solution Place 1 drop into the right eye nightly      omeprazole (PRILOSEC) 20 MG capsule TAKE 1 CAPSULE DAILY 90 capsule 0     No current facility-administered medications on file prior to visit.         No Known Allergies  Past Medical History:   Diagnosis Date    Cancer [facility]) 2014    prostate Dr Wilfredo Eng s/p rads with cyber knife     Dupuytrens contracture     Foot pain, left     GERD (gastroesophageal reflux disease)     Migraine     Mole (skin)     Nocturia     Reflux esophagitis     Shoulder pain     Sinusitis      Patient Active Problem List   Diagnosis    Reflux esophagitis    Migraine    Prostate cancer ([facility] [address]    Allergic sinusitis    Dupuytren's contracture of right hand    Sciatica of left side    Stage 3a chronic kidney disease [facility])     Past Surgical History:   Procedure Laterality Date    COLONOSCOPY  2000 & 2013    Dr Paradise Sanchez repeat 2018    COLONOSCOPY  02/08/2018    Dr. Alfredito Graham polyp removed0- repeat 2022    EYE SURGERY  1999    detached retina bilat     FINGER TRIGGER RELEASE Left 8/13/13    middle finger    PROSTATE SURGERY  10/2013    mult Bx  1 +Bx -s/p cyber knife 9/2014     Social History     Socioeconomic History    Marital status: Single     Spouse name: Not on file    Number of children: Not on file    Years of education: Not on file    Highest education level: Not on file   Occupational History    Occupation:    Tobacco Use    Smoking status: Former     Packs/day: 1.00     Years: 10.00     Pack years: 10.00     Types: Cigarettes     Quit date: 1980     Years since quittin.0    Smokeless tobacco: Never   Substance and Sexual Activity    Alcohol use: Yes     Alcohol/week: 0.0 standard drinks     Comment: occ    Drug use: Yes    Sexual activity: Not on file   Other Topics Concern    Not on file   Social History Narrative    Not on file     Social Determinants of Health     Financial Resource Strain: Low Risk     Difficulty of Paying Living Expenses: Not hard at all   Food Insecurity: No Food Insecurity    Worried About Running Out of Food in the Last Year: Never true    Ran Out of Food in the Last Year: Never true   Transportation Needs: Not on file   Physical Activity: Sufficiently Active    Days of Exercise per Week: 3 days    Minutes of Exercise per Session: 120 min   Stress: Not on file   Social Connections: Not on file   Intimate Partner Violence: Not on file   Housing Stability: Not on file      Family History   Problem Relation Age of Onset    Breast Cancer Mother 44    Stroke Brother     Breast Cancer Maternal Grandmother          ROS  Review of Systems   Respiratory:  Negative for shortness of breath. Cardiovascular:  Negative for chest pain and leg swelling. PE  Vitals:    23 1502   BP: 130/60   Site: Left Upper Arm   Position: Sitting   Cuff Size: Large Adult   Pulse: 87   Temp: 97.4 °F (36.3 °C)   TempSrc: Temporal   SpO2: 98%   Weight: 148 lb 12.8 oz (67.5 kg)   Height: 5' 9\" (1.753 m)     Estimated body mass index is 21.97 kg/m² as calculated from the following:    Height as of this encounter: 5' 9\" (1.753 m). Weight as of this encounter: 148 lb 12.8 oz (67.5 kg). Physical Exam  Vitals reviewed. Constitutional:       General: He is not in acute distress. Appearance: Normal appearance. He is well-developed.  He is not ill-appearing, toxic-appearing or diaphoretic. HENT:      Head: Normocephalic and atraumatic. Eyes:      General: No scleral icterus. Conjunctiva/sclera: Conjunctivae normal.      Pupils: Pupils are equal, round, and reactive to light. Cardiovascular:      Rate and Rhythm: Normal rate and regular rhythm. Heart sounds: Normal heart sounds. Pulmonary:      Effort: Pulmonary effort is normal. No respiratory distress. Breath sounds: Normal breath sounds. Abdominal:      General: There is no distension. Palpations: Abdomen is soft. Tenderness: There is no abdominal tenderness. Musculoskeletal:      Cervical back: Normal range of motion and neck supple. Right lower leg: No edema. Left lower leg: No edema. Skin:     General: Skin is warm and dry. Coloration: Skin is not jaundiced. Neurological:      Mental Status: He is alert and oriented to person, place, and time.    Psychiatric:         Behavior: Behavior normal.       Immunization History   Administered Date(s) Administered    COVID-19, MODERNA BLUE border, Primary or Immunocompromised, (age 12y+), IM, 100 mcg/0.5mL 02/08/2021, 03/11/2021, 10/26/2021, 04/22/2022    COVID-19, MODERNA Bivalent BOOSTER, (age 12y+), IM, 48 mcg/0.5 mL 09/17/2022    Influenza Virus Vaccine 09/23/2008, 09/09/2009    Influenza, FLUZONE (age 72 y+), High Dose, 0.7mL 09/29/2020, 09/30/2021, 10/06/2022    Influenza, High Dose (Fluzone 65 yrs and older) 09/20/2014, 10/27/2015, 10/14/2016, 10/20/2017, 09/08/2018, 10/21/2019    Pneumococcal Conjugate 13-valent ([id number]) 07/22/2015    Pneumococcal Polysaccharide ([id number]) 02/18/2013    Tdap (Boostrix, Adacel) 09/06/2008, 10/15/2018    Zoster Live (Zostavax) 12/30/2015    Zoster Recombinant (Shingrix) 10/21/2020, 06/03/2021       Health Maintenance   Topic Date Due    Colorectal Cancer Screen  02/08/2023    Annual Wellness Visit (AWV)  07/07/2023    Depression Screen  11/11/2023    GFR test (Diabetes, CKD 3-4, OR last GFR 15-59)  11/11/2023    Prostate Specific Antigen (PSA) Screening or Monitoring  12/30/2023    Lipids  07/01/2027    DTaP/Tdap/Td vaccine (3 - Td or Tdap) 10/15/2028    Flu vaccine  Completed    Shingles vaccine  Completed    Pneumococcal 65+ years Vaccine  Completed    COVID-19 Vaccine  Completed    AAA screen  Completed    Hepatitis C screen  Completed    Hepatitis A vaccine  Aged Out    Hib vaccine  Aged Out    Meningococcal (ACWY) vaccine  Aged Out       PSH, PMH, SH and FH reviewed and noted. Recent and past labs, tests and consults also reviewed. Recent or new meds also reviewed. Ravi Nagel MD    This dictation was generated by voice recognition computer software. Although all attempts are made to edit the dictation for accuracy, there may be errors in the transcription that are not intended.

## 2023-01-11 NOTE — ASSESSMENT & PLAN NOTE
-had been on PPI for ~20y.    -will continue for now given now on streoid but we might want to consider to try wean off in the future (especially given CKD)

## 2023-01-11 NOTE — ASSESSMENT & PLAN NOTE
-baseline GFR 50s  -avoid NASIDs  -stay well hydrated  -will want to reevaluate PPI if possible down the road

## 2023-01-11 NOTE — ASSESSMENT & PLAN NOTE
- S/p CyberKnife 2014  -Course of radiation a few years ago for ?hip lesion  -Recently completed a second course of radiation for spinal mets on recent PET  -Currently on abiraterone and prednisone  -Strong family history of breast cancer (mother, grandmother, male cousin, all at young age).   Reportedly had genetic testing done by urology 2 weeks ago, awaiting records.  -Now on close PSA follow-up

## 2023-01-12 LAB
A/G RATIO: 1.8 (ref 1.1–2.2)
ALBUMIN SERPL-MCNC: 4.2 G/DL (ref 3.4–5)
ALP BLD-CCNC: 76 U/L (ref 40–129)
ALT SERPL-CCNC: 31 U/L (ref 10–40)
ANION GAP SERPL CALCULATED.3IONS-SCNC: 12 MMOL/L (ref 3–16)
AST SERPL-CCNC: 31 U/L (ref 15–37)
BILIRUB SERPL-MCNC: 0.3 MG/DL (ref 0–1)
BUN BLDV-MCNC: 21 MG/DL (ref 7–20)
CALCIUM SERPL-MCNC: 9.4 MG/DL (ref 8.3–10.6)
CHLORIDE BLD-SCNC: 100 MMOL/L (ref 99–110)
CO2: 28 MMOL/L (ref 21–32)
CREAT SERPL-MCNC: 1.2 MG/DL (ref 0.8–1.3)
GFR SERPL CREATININE-BSD FRML MDRD: >60 ML/MIN/{1.73_M2}
GLUCOSE BLD-MCNC: 98 MG/DL (ref 70–99)
POTASSIUM SERPL-SCNC: 4.4 MMOL/L (ref 3.5–5.1)
PROSTATE SPECIFIC ANTIGEN: 0.06 NG/ML (ref 0–4)
SODIUM BLD-SCNC: 140 MMOL/L (ref 136–145)
TOTAL PROTEIN: 6.5 G/DL (ref 6.4–8.2)

## 2023-01-16 ENCOUNTER — TELEPHONE (OUTPATIENT)
Dept: INTERNAL MEDICINE CLINIC | Age: 73
End: 2023-01-16

## 2023-01-16 NOTE — TELEPHONE ENCOUNTER
Patient called in stating that his bone scan test was normal but he is still waiting for the results for genetic testing. Pls advise.

## 2023-01-26 LAB
ALBUMIN SERPL-MCNC: 4.5 G/DL (ref 3.5–5.2)
ALP BLD-CCNC: 72 U/L (ref 53–128)
ALT SERPL-CCNC: 32 U/L (ref 0–45)
AST SERPL-CCNC: 28 U/L (ref 5–34)
BILIRUB SERPL-MCNC: 0.5 MG/DL (ref 0.1–1.2)
BUN / CREAT RATIO: 15.7 RATIO (ref 9–28)
BUN BLDV-MCNC: 22 MG/DL (ref 6–20)
CALCIUM SERPL-MCNC: 10 MG/DL (ref 8.6–10.3)
CHLORIDE BLD-SCNC: 103 MMOL/L (ref 97–107)
CO2: 29 MMOL/L (ref 21–31)
CREAT SERPL-MCNC: 1.4 MG/DL (ref 0.7–1.2)
GLUCOSE: 119 MG/DL (ref 74–106)
POTASSIUM SERPL-SCNC: 5 MMOL/L (ref 3.6–5)
PSA, TOTAL: 0.01 NG/ML (ref 0–4)
SODIUM BLD-SCNC: 139 MMOL/L (ref 135–145)
TOTAL PROTEIN: 6.9 G/DL (ref 6.4–8.3)

## 2023-02-21 PROBLEM — Z15.09 BRCA POSITIVE: Status: ACTIVE | Noted: 2023-02-21

## 2023-02-21 PROBLEM — Z15.01 BRCA POSITIVE: Status: ACTIVE | Noted: 2023-02-21

## 2023-03-21 LAB
ALBUMIN SERPL-MCNC: 4.1 G/DL (ref 3.5–5.2)
ALP BLD-CCNC: 113 U/L (ref 53–128)
ALT SERPL-CCNC: 26 U/L (ref 0–45)
AST SERPL-CCNC: 32 U/L (ref 5–34)
BILIRUB SERPL-MCNC: 0.5 MG/DL (ref 0.1–1.2)
BUN / CREAT RATIO: 14.3 RATIO (ref 9–28)
BUN BLDV-MCNC: 20 MG/DL (ref 6–20)
CALCIUM SERPL-MCNC: 9.8 MG/DL (ref 8.6–10.3)
CHLORIDE BLD-SCNC: 98 MMOL/L (ref 97–107)
CO2: 31 MMOL/L (ref 21–31)
CREAT SERPL-MCNC: 1.4 MG/DL (ref 0.7–1.2)
GLUCOSE: 93 MG/DL (ref 74–106)
POTASSIUM SERPL-SCNC: 4.4 MMOL/L (ref 3.6–5)
PSA, TOTAL: <0.01 NG/ML (ref 0–4)
SODIUM BLD-SCNC: 135 MMOL/L (ref 135–145)
TESTOSTERONE: 10 NG/DL (ref 175–781)
TOTAL PROTEIN: 6.6 G/DL (ref 6.4–8.3)

## 2023-05-15 ENCOUNTER — HOSPITAL ENCOUNTER (OUTPATIENT)
Dept: CT IMAGING | Age: 73
Discharge: HOME | End: 2023-05-15
Payer: MEDICARE

## 2023-05-15 ENCOUNTER — HOSPITAL ENCOUNTER (OUTPATIENT)
Dept: NUCLEAR MEDICINE | Age: 73
Discharge: HOME | End: 2023-05-15
Payer: MEDICARE

## 2023-05-15 DIAGNOSIS — C61 PROSTATE CANCER (HCC): ICD-10-CM

## 2023-05-15 LAB
PERFORMED ON: NORMAL
POC CREATININE: 1.2 MG/DL (ref 0.8–1.3)
POC SAMPLE TYPE: NORMAL

## 2023-05-15 PROCEDURE — 74177 CT ABD & PELVIS W/CONTRAST: CPT

## 2023-05-15 PROCEDURE — 6360000004 HC RX CONTRAST MEDICATION: Performed by: INTERNAL MEDICINE

## 2023-05-15 PROCEDURE — 3430000000 HC RX DIAGNOSTIC RADIOPHARMACEUTICAL: Performed by: INTERNAL MEDICINE

## 2023-05-15 PROCEDURE — A9503 TC99M MEDRONATE: HCPCS | Performed by: INTERNAL MEDICINE

## 2023-05-15 PROCEDURE — 78306 BONE IMAGING WHOLE BODY: CPT | Performed by: INTERNAL MEDICINE

## 2023-05-15 PROCEDURE — 82565 ASSAY OF CREATININE: CPT

## 2023-05-15 RX ORDER — TC 99M MEDRONATE 20 MG/10ML
25 INJECTION, POWDER, LYOPHILIZED, FOR SOLUTION INTRAVENOUS
Status: COMPLETED | OUTPATIENT
Start: 2023-05-15 | End: 2023-05-15

## 2023-05-15 RX ADMIN — TC 99M MEDRONATE 25 MILLICURIE: 20 INJECTION, POWDER, LYOPHILIZED, FOR SOLUTION INTRAVENOUS at 07:55

## 2023-05-15 RX ADMIN — IOPAMIDOL 75 ML: 755 INJECTION, SOLUTION INTRAVENOUS at 09:24

## 2023-05-18 ENCOUNTER — HOSPITAL ENCOUNTER (INPATIENT)
Age: 73
LOS: 1 days | Discharge: HOME | DRG: 543 | End: 2023-05-19
Attending: EMERGENCY MEDICINE | Admitting: HOSPITALIST
Payer: MEDICARE

## 2023-05-18 DIAGNOSIS — C79.51 PROSTATE CANCER METASTATIC TO BONE (HCC): Primary | ICD-10-CM

## 2023-05-18 DIAGNOSIS — C61 PROSTATE CANCER METASTATIC TO BONE (HCC): Primary | ICD-10-CM

## 2023-05-18 PROBLEM — M54.9 BACK PAIN, SUBACUTE: Status: ACTIVE | Noted: 2023-05-18

## 2023-05-18 LAB
ALBUMIN SERPL-MCNC: 3.4 G/DL (ref 3.4–5)
ALP SERPL-CCNC: 188 U/L (ref 40–129)
ALT SERPL-CCNC: 29 U/L (ref 10–40)
ANION GAP SERPL CALCULATED.3IONS-SCNC: 11 MMOL/L (ref 3–16)
AST SERPL-CCNC: 53 U/L (ref 15–37)
BASOPHILS # BLD: 0 K/UL (ref 0–0.2)
BASOPHILS NFR BLD: 0.4 %
BILIRUB DIRECT SERPL-MCNC: <0.2 MG/DL (ref 0–0.3)
BILIRUB INDIRECT SERPL-MCNC: ABNORMAL MG/DL (ref 0–1)
BILIRUB SERPL-MCNC: 0.5 MG/DL (ref 0–1)
BUN SERPL-MCNC: 22 MG/DL (ref 7–20)
CALCIUM SERPL-MCNC: 10.7 MG/DL (ref 8.3–10.6)
CHLORIDE SERPL-SCNC: 95 MMOL/L (ref 99–110)
CO2 SERPL-SCNC: 25 MMOL/L (ref 21–32)
CREAT SERPL-MCNC: 1.1 MG/DL (ref 0.8–1.3)
DEPRECATED RDW RBC AUTO: 12.6 % (ref 12.4–15.4)
EOSINOPHIL # BLD: 0 K/UL (ref 0–0.6)
EOSINOPHIL NFR BLD: 0 %
GFR SERPLBLD CREATININE-BSD FMLA CKD-EPI: >60 ML/MIN/{1.73_M2}
GLUCOSE BLD-MCNC: 145 MG/DL (ref 70–99)
GLUCOSE SERPL-MCNC: 112 MG/DL (ref 70–99)
HCT VFR BLD AUTO: 31.2 % (ref 40.5–52.5)
HGB BLD-MCNC: 10.6 G/DL (ref 13.5–17.5)
LYMPHOCYTES # BLD: 0.2 K/UL (ref 1–5.1)
LYMPHOCYTES NFR BLD: 2.9 %
MCH RBC QN AUTO: 30.2 PG (ref 26–34)
MCHC RBC AUTO-ENTMCNC: 33.9 G/DL (ref 31–36)
MCV RBC AUTO: 89.1 FL (ref 80–100)
MONOCYTES # BLD: 0.6 K/UL (ref 0–1.3)
MONOCYTES NFR BLD: 7.1 %
NEUTROPHILS # BLD: 7.1 K/UL (ref 1.7–7.7)
NEUTROPHILS NFR BLD: 89.6 %
PERFORMED ON: ABNORMAL
PLATELET # BLD AUTO: 192 K/UL (ref 135–450)
PMV BLD AUTO: 8.4 FL (ref 5–10.5)
POTASSIUM SERPL-SCNC: 3.9 MMOL/L (ref 3.5–5.1)
PROT SERPL-MCNC: 6.9 G/DL (ref 6.4–8.2)
RBC # BLD AUTO: 3.5 M/UL (ref 4.2–5.9)
SODIUM SERPL-SCNC: 131 MMOL/L (ref 136–145)
WBC # BLD AUTO: 7.9 K/UL (ref 4–11)

## 2023-05-18 PROCEDURE — 85025 COMPLETE CBC W/AUTO DIFF WBC: CPT

## 2023-05-18 PROCEDURE — 2060000000 HC ICU INTERMEDIATE R&B

## 2023-05-18 PROCEDURE — 80076 HEPATIC FUNCTION PANEL: CPT

## 2023-05-18 PROCEDURE — 80048 BASIC METABOLIC PNL TOTAL CA: CPT

## 2023-05-18 PROCEDURE — 2580000003 HC RX 258

## 2023-05-18 PROCEDURE — 6370000000 HC RX 637 (ALT 250 FOR IP)

## 2023-05-18 PROCEDURE — G0378 HOSPITAL OBSERVATION PER HR: HCPCS

## 2023-05-18 PROCEDURE — 36415 COLL VENOUS BLD VENIPUNCTURE: CPT

## 2023-05-18 PROCEDURE — 99285 EMERGENCY DEPT VISIT HI MDM: CPT

## 2023-05-18 PROCEDURE — 6360000002 HC RX W HCPCS

## 2023-05-18 PROCEDURE — 6360000002 HC RX W HCPCS: Performed by: INTERNAL MEDICINE

## 2023-05-18 PROCEDURE — 96375 TX/PRO/DX INJ NEW DRUG ADDON: CPT

## 2023-05-18 PROCEDURE — 96374 THER/PROPH/DIAG INJ IV PUSH: CPT

## 2023-05-18 PROCEDURE — 99223 1ST HOSP IP/OBS HIGH 75: CPT | Performed by: HOSPITALIST

## 2023-05-18 PROCEDURE — 96372 THER/PROPH/DIAG INJ SC/IM: CPT

## 2023-05-18 PROCEDURE — 6360000002 HC RX W HCPCS: Performed by: EMERGENCY MEDICINE

## 2023-05-18 RX ORDER — ACETAMINOPHEN 325 MG/1
650 TABLET ORAL EVERY 6 HOURS PRN
Status: DISCONTINUED | OUTPATIENT
Start: 2023-05-18 | End: 2023-05-19 | Stop reason: HOSPADM

## 2023-05-18 RX ORDER — SODIUM CHLORIDE 0.9 % (FLUSH) 0.9 %
5-40 SYRINGE (ML) INJECTION EVERY 12 HOURS SCHEDULED
Status: DISCONTINUED | OUTPATIENT
Start: 2023-05-18 | End: 2023-05-19 | Stop reason: HOSPADM

## 2023-05-18 RX ORDER — ACETAMINOPHEN 650 MG/1
650 SUPPOSITORY RECTAL EVERY 6 HOURS PRN
Status: DISCONTINUED | OUTPATIENT
Start: 2023-05-18 | End: 2023-05-19 | Stop reason: HOSPADM

## 2023-05-18 RX ORDER — INSULIN LISPRO 100 [IU]/ML
0-4 INJECTION, SOLUTION INTRAVENOUS; SUBCUTANEOUS
Status: DISCONTINUED | OUTPATIENT
Start: 2023-05-18 | End: 2023-05-19 | Stop reason: HOSPADM

## 2023-05-18 RX ORDER — FENTANYL CITRATE 50 UG/ML
25 INJECTION, SOLUTION INTRAMUSCULAR; INTRAVENOUS ONCE
Status: COMPLETED | OUTPATIENT
Start: 2023-05-18 | End: 2023-05-18

## 2023-05-18 RX ORDER — HYDROCODONE BITARTRATE AND ACETAMINOPHEN 5; 325 MG/1; MG/1
2 TABLET ORAL EVERY 4 HOURS PRN
COMMUNITY
Start: 2023-05-12

## 2023-05-18 RX ORDER — DEXAMETHASONE 4 MG/1
4 TABLET ORAL 3 TIMES DAILY
COMMUNITY
Start: 2023-05-16

## 2023-05-18 RX ORDER — ONDANSETRON 2 MG/ML
4 INJECTION INTRAMUSCULAR; INTRAVENOUS EVERY 6 HOURS PRN
Status: DISCONTINUED | OUTPATIENT
Start: 2023-05-18 | End: 2023-05-19 | Stop reason: HOSPADM

## 2023-05-18 RX ORDER — ONDANSETRON 4 MG/1
4 TABLET, ORALLY DISINTEGRATING ORAL EVERY 8 HOURS PRN
Status: DISCONTINUED | OUTPATIENT
Start: 2023-05-18 | End: 2023-05-19 | Stop reason: HOSPADM

## 2023-05-18 RX ORDER — PREDNISONE 5 MG/1
5 TABLET ORAL EVERY MORNING
Status: DISCONTINUED | OUTPATIENT
Start: 2023-05-19 | End: 2023-05-19 | Stop reason: HOSPADM

## 2023-05-18 RX ORDER — ENOXAPARIN SODIUM 100 MG/ML
40 INJECTION SUBCUTANEOUS DAILY
Status: DISCONTINUED | OUTPATIENT
Start: 2023-05-18 | End: 2023-05-19 | Stop reason: HOSPADM

## 2023-05-18 RX ORDER — PANTOPRAZOLE SODIUM 40 MG/1
40 TABLET, DELAYED RELEASE ORAL
Status: DISCONTINUED | OUTPATIENT
Start: 2023-05-19 | End: 2023-05-19 | Stop reason: HOSPADM

## 2023-05-18 RX ORDER — SENNA PLUS 8.6 MG/1
2 TABLET ORAL DAILY
COMMUNITY

## 2023-05-18 RX ORDER — POLYETHYLENE GLYCOL 3350 17 G/17G
17 POWDER, FOR SOLUTION ORAL DAILY PRN
Status: DISCONTINUED | OUTPATIENT
Start: 2023-05-18 | End: 2023-05-19 | Stop reason: HOSPADM

## 2023-05-18 RX ORDER — GLUCAGON 1 MG/ML
1 KIT INJECTION PRN
Status: DISCONTINUED | OUTPATIENT
Start: 2023-05-18 | End: 2023-05-19 | Stop reason: HOSPADM

## 2023-05-18 RX ORDER — SODIUM CHLORIDE 0.9 % (FLUSH) 0.9 %
5-40 SYRINGE (ML) INJECTION PRN
Status: DISCONTINUED | OUTPATIENT
Start: 2023-05-18 | End: 2023-05-19 | Stop reason: HOSPADM

## 2023-05-18 RX ORDER — SODIUM CHLORIDE 9 MG/ML
INJECTION, SOLUTION INTRAVENOUS PRN
Status: DISCONTINUED | OUTPATIENT
Start: 2023-05-18 | End: 2023-05-19 | Stop reason: HOSPADM

## 2023-05-18 RX ORDER — ABIRATERONE ACETATE 250 MG/1
1000 TABLET ORAL DAILY
Status: DISCONTINUED | OUTPATIENT
Start: 2023-05-19 | End: 2023-05-19 | Stop reason: HOSPADM

## 2023-05-18 RX ORDER — LATANOPROST 50 UG/ML
1 SOLUTION/ DROPS OPHTHALMIC NIGHTLY
Status: DISCONTINUED | OUTPATIENT
Start: 2023-05-18 | End: 2023-05-19 | Stop reason: HOSPADM

## 2023-05-18 RX ORDER — DEXAMETHASONE 4 MG/1
4 TABLET ORAL DAILY
Status: DISCONTINUED | OUTPATIENT
Start: 2023-05-18 | End: 2023-05-19 | Stop reason: HOSPADM

## 2023-05-18 RX ORDER — INSULIN LISPRO 100 [IU]/ML
0-4 INJECTION, SOLUTION INTRAVENOUS; SUBCUTANEOUS NIGHTLY
Status: DISCONTINUED | OUTPATIENT
Start: 2023-05-18 | End: 2023-05-19 | Stop reason: HOSPADM

## 2023-05-18 RX ORDER — DEXTROSE MONOHYDRATE 100 MG/ML
INJECTION, SOLUTION INTRAVENOUS CONTINUOUS PRN
Status: DISCONTINUED | OUTPATIENT
Start: 2023-05-18 | End: 2023-05-19 | Stop reason: HOSPADM

## 2023-05-18 RX ADMIN — ENOXAPARIN SODIUM 40 MG: 100 INJECTION SUBCUTANEOUS at 21:17

## 2023-05-18 RX ADMIN — HYDROMORPHONE HYDROCHLORIDE 0.5 MG: 1 INJECTION, SOLUTION INTRAMUSCULAR; INTRAVENOUS; SUBCUTANEOUS at 14:43

## 2023-05-18 RX ADMIN — FENTANYL CITRATE 25 MCG: 50 INJECTION, SOLUTION INTRAMUSCULAR; INTRAVENOUS at 13:42

## 2023-05-18 RX ADMIN — ACETAMINOPHEN 650 MG: 325 TABLET ORAL at 21:20

## 2023-05-18 RX ADMIN — SODIUM CHLORIDE, PRESERVATIVE FREE 10 ML: 5 INJECTION INTRAVENOUS at 21:18

## 2023-05-18 RX ADMIN — LATANOPROST 1 DROP: 50 SOLUTION OPHTHALMIC at 23:42

## 2023-05-18 ASSESSMENT — ENCOUNTER SYMPTOMS
CONSTIPATION: 0
RHINORRHEA: 0
DIARRHEA: 0
BACK PAIN: 1
EYE ITCHING: 0
NAUSEA: 0
CHOKING: 0
COLOR CHANGE: 0
EYE DISCHARGE: 0
SHORTNESS OF BREATH: 0
CHEST TIGHTNESS: 0
SINUS PRESSURE: 0
ABDOMINAL PAIN: 0
SORE THROAT: 0

## 2023-05-18 ASSESSMENT — PAIN DESCRIPTION - LOCATION
LOCATION: BACK

## 2023-05-18 ASSESSMENT — PAIN SCALES - GENERAL
PAINLEVEL_OUTOF10: 3
PAINLEVEL_OUTOF10: 6
PAINLEVEL_OUTOF10: 10

## 2023-05-18 ASSESSMENT — LIFESTYLE VARIABLES
HOW OFTEN DO YOU HAVE A DRINK CONTAINING ALCOHOL: MONTHLY OR LESS
HOW MANY STANDARD DRINKS CONTAINING ALCOHOL DO YOU HAVE ON A TYPICAL DAY: PATIENT DOES NOT DRINK

## 2023-05-18 ASSESSMENT — PAIN - FUNCTIONAL ASSESSMENT
PAIN_FUNCTIONAL_ASSESSMENT: 0-10
PAIN_FUNCTIONAL_ASSESSMENT: 0-10

## 2023-05-18 ASSESSMENT — PAIN DESCRIPTION - ORIENTATION
ORIENTATION: MID
ORIENTATION: LOWER;MID;RIGHT;LEFT

## 2023-05-18 ASSESSMENT — PAIN DESCRIPTION - DESCRIPTORS: DESCRIPTORS: ACHING

## 2023-05-18 ASSESSMENT — PAIN DESCRIPTION - FREQUENCY: FREQUENCY: CONTINUOUS

## 2023-05-18 ASSESSMENT — PAIN DESCRIPTION - PAIN TYPE: TYPE: ACUTE PAIN

## 2023-05-18 NOTE — ED PROVIDER NOTES
ED Attending Attestation Note     Date of evaluation: 5/18/2023    This patient was seen by the SCOT. I have seen and examined the patient, agree with the workup, evaluation, management and diagnosis. The care plan has been discussed. I have reviewed the ECG and concur with the SCOT's interpretation. I was present for any procedures performed in the SCOT's note and have made edits to the note where appropriate. My assessment reveals 74 y.o. male with history of metastatic prostate cancer presenting for uncontrolled pain. Was sent here due to difficulty tolerating his mapping for radiation therapy. Here he is in no distress but does appear uncomfortable. Vital signs reassuring. We will treat his pain aggressively and anticipate admission to the hospital for ongoing pain management and possible initiation of radiation from there.        Jacob Pink MD  05/18/23 0516
Negative for behavioral problems and confusion. All other systems reviewed and are negative. Past Medical, Surgical, Family, and Social History     He has a past medical history of Cancer ([address] Dupuytrens contracture, Foot pain, left, GERD (gastroesophageal reflux disease), Migraine, Mole (skin), Nocturia, Reflux esophagitis, Shoulder pain, and Sinusitis. He has a past surgical history that includes eye surgery (1999); Finger trigger release (Left, 8/13/13); Prostate surgery (10/2013); Colonoscopy (2000 & 2013); and Colonoscopy (02/08/2018). His family history includes Breast Cancer in his maternal grandmother; Breast Cancer (age of onset: 44) in his mother; Stroke in his brother. He reports that he quit smoking about 43 years ago. His smoking use included cigarettes. He has a 10.00 pack-year smoking history. He has never used smokeless tobacco. He reports current alcohol use. He reports current drug use. Medications     Previous Medications    ABIRATERONE ACETATE (ZYTIGA) 250 MG TABLET    4 tablets in the AM    DEXAMETHASONE (DECADRON) 4 MG TABLET        HYDROCODONE-ACETAMINOPHEN (NORCO) 5-325 MG PER TABLET        LATANOPROST (XALATAN) 0.005 % OPHTHALMIC SOLUTION    Place 1 drop into the right eye nightly    OMEPRAZOLE (PRILOSEC) 20 MG CAPSULE    TAKE 1 CAPSULE DAILY    PREDNISONE (DELTASONE) 5 MG TABLET    every morning       Allergies     He has No Known Allergies. Physical Exam     INITIAL VITALS: BP: (!) 169/86, Temp: 98.1 °F (36.7 °C), Pulse: 84, Respirations: 19, SpO2: 99 %  Physical Exam  Constitutional:       Appearance: Normal appearance. HENT:      Head: Normocephalic and atraumatic. Right Ear: External ear normal.      Left Ear: External ear normal.      Nose: Nose normal.      Mouth/Throat:      Mouth: Mucous membranes are moist.   Eyes:      Extraocular Movements: Extraocular movements intact.       Conjunctiva/sclera: Conjunctivae normal.      Pupils: Pupils are equal, round, and

## 2023-05-18 NOTE — ED NOTES
Sinusitis        Assessment    Vitals/MEWS: MEWS Score: 1  Level of Consciousness: Alert (0)   Vitals:    05/18/23 1303 05/18/23 1426 05/18/23 1635   BP: (!) 169/86 (!) 153/83 (!) 156/81   Pulse: 84 86 81   Resp: 19 16 16   Temp: 98.1 °F (36.7 °C)  97.6 °F (36.4 °C)   TempSrc: Oral  Oral   SpO2: 99% 98% 95%   Weight: 145 lb 11.2 oz (66.1 kg)     Height: 5' 9\" (1.753 m)       FiO2 (%): N/A  O2 Flow Rate: O2 Device: None (Room air)    Cardiac Rhythm:    Pain Assessment:  [x] Verbal [] Bernardino Juanjo Scale  Pain Scale: Pain Assessment  Pain Assessment: 0-10  Pain Level: 6  Pain Location: Back  Pain Orientation: Lower, Mid, Right, Left  Pain Type: Acute pain  Pain Frequency: Continuous  Last documented pain score (0-10 scale) Pain Level: 6  Last documented pain medication administered: See MAR  Mental Status: oriented, alert, coherent, logical, thought processes intact, and able to concentrate and follow conversation  Orientation Level:    NIH Score:    C-SSRS: Risk of Suicide: No Risk  Bedside swallow:    Twining Coma Scale (GCS): Mike Coma Scale  Eye Opening: Spontaneous  Best Verbal Response: Oriented  Best Motor Response: Obeys commands  Mike Coma Scale Score: 15  Active LDA's:   Peripheral IV 05/18/23 Posterior;Right Forearm (Active)   Site Assessment Clean, dry & intact 05/18/23 1340   Line Status Blood return noted 05/18/23 1340   Phlebitis Assessment No symptoms 05/18/23 1340   Infiltration Assessment 0 05/18/23 1340   Alcohol Cap Used Yes 05/18/23 1340   Dressing Status Clean, dry & intact; New dressing applied 05/18/23 1340   Dressing Type Transparent 05/18/23 1340   Dressing Intervention New 05/18/23 1340     PO Status: Regular  Pertinent or High Risk Medications/Drips: no   If Yes, please provide details: n/a  Pending Blood Product Administration: no       You may also review the ED PT Care Timeline found under the Summary Nursing Index tab.     Recommendation    Pending orders N/A  Plan for Discharge (if

## 2023-05-18 NOTE — H&P
pack-year smoking history. He has never used smokeless tobacco.  ETOH:   reports current alcohol use. DRUGS : none  Patient currently lives at home    Family History:       Problem Relation Age of Onset    Breast Cancer Mother 44    Stroke Brother     Breast Cancer Maternal Grandmother        Review of Systems    ROS: A 10 point review of systems was conducted, significant findings as noted in HPI. Physical Exam  Constitutional:       General: He is not in acute distress. Appearance: He is not ill-appearing. HENT:      Head: Normocephalic and atraumatic. Mouth/Throat:      Mouth: Mucous membranes are moist.   Eyes:      Extraocular Movements: Extraocular movements intact. Pupils: Pupils are equal, round, and reactive to light. Cardiovascular:      Rate and Rhythm: Normal rate and regular rhythm. Pulmonary:      Effort: Pulmonary effort is normal. No respiratory distress. Breath sounds: Normal breath sounds. No stridor. No wheezing or rhonchi. Abdominal:      General: Bowel sounds are normal. There is no distension. Palpations: Abdomen is soft. There is no mass. Tenderness: There is no abdominal tenderness. Hernia: No hernia is present. Musculoskeletal:         General: Tenderness (back) present. Right lower leg: No edema. Left lower leg: No edema. Skin:     General: Skin is warm and dry. Coloration: Skin is not jaundiced. Neurological:      General: No focal deficit present. Mental Status: He is alert. Mental status is at baseline.    Psychiatric:         Mood and Affect: Mood normal.         Behavior: Behavior normal.     Physical exam:       Vitals:    05/18/23 1426   BP: (!) 153/83   Pulse: 86   Resp: 16   Temp:    SpO2: 98%       DATA:    Labs:  CBC:   Recent Labs     05/18/23  1347   WBC 7.9   HGB 10.6*   HCT 31.2*          BMP:   Recent Labs     05/18/23  1347   *   K 3.9   CL 95*   CO2 25   BUN 22*   CREATININE 1.1   GLUCOSE

## 2023-05-19 ENCOUNTER — TELEPHONE (OUTPATIENT)
Dept: INTERNAL MEDICINE CLINIC | Age: 73
End: 2023-05-19

## 2023-05-19 VITALS
SYSTOLIC BLOOD PRESSURE: 131 MMHG | BODY MASS INDEX: 21.58 KG/M2 | WEIGHT: 145.7 LBS | HEART RATE: 69 BPM | OXYGEN SATURATION: 99 % | DIASTOLIC BLOOD PRESSURE: 76 MMHG | RESPIRATION RATE: 22 BRPM | TEMPERATURE: 98.1 F | HEIGHT: 69 IN

## 2023-05-19 DIAGNOSIS — C61 PROSTATE CANCER METASTATIC TO BONE (HCC): Primary | ICD-10-CM

## 2023-05-19 DIAGNOSIS — C79.51 PROSTATE CANCER METASTATIC TO BONE (HCC): Primary | ICD-10-CM

## 2023-05-19 LAB
ANION GAP SERPL CALCULATED.3IONS-SCNC: 6 MMOL/L (ref 3–16)
BASOPHILS # BLD: 0 K/UL (ref 0–0.2)
BASOPHILS NFR BLD: 0 %
BUN SERPL-MCNC: 26 MG/DL (ref 7–20)
CALCIUM SERPL-MCNC: 10.2 MG/DL (ref 8.3–10.6)
CHLORIDE SERPL-SCNC: 99 MMOL/L (ref 99–110)
CO2 SERPL-SCNC: 31 MMOL/L (ref 21–32)
CREAT SERPL-MCNC: 1.2 MG/DL (ref 0.8–1.3)
DACRYOCYTES BLD QL SMEAR: ABNORMAL
DEPRECATED RDW RBC AUTO: 13.1 % (ref 12.4–15.4)
EOSINOPHIL # BLD: 0 K/UL (ref 0–0.6)
EOSINOPHIL NFR BLD: 0 %
GFR SERPLBLD CREATININE-BSD FMLA CKD-EPI: >60 ML/MIN/{1.73_M2}
GLUCOSE BLD-MCNC: 123 MG/DL (ref 70–99)
GLUCOSE BLD-MCNC: 92 MG/DL (ref 70–99)
GLUCOSE SERPL-MCNC: 100 MG/DL (ref 70–99)
HCT VFR BLD AUTO: 30.7 % (ref 40.5–52.5)
HGB BLD-MCNC: 10.5 G/DL (ref 13.5–17.5)
LYMPHOCYTES # BLD: 0.4 K/UL (ref 1–5.1)
LYMPHOCYTES NFR BLD: 6 %
MAGNESIUM SERPL-MCNC: 1.8 MG/DL (ref 1.8–2.4)
MCH RBC QN AUTO: 30.4 PG (ref 26–34)
MCHC RBC AUTO-ENTMCNC: 34.3 G/DL (ref 31–36)
MCV RBC AUTO: 88.4 FL (ref 80–100)
MICROCYTES BLD QL SMEAR: ABNORMAL
MONOCYTES # BLD: 0.3 K/UL (ref 0–1.3)
MONOCYTES NFR BLD: 5 %
NEUTROPHILS # BLD: 5.3 K/UL (ref 1.7–7.7)
NEUTROPHILS NFR BLD: 89 %
OVALOCYTES BLD QL SMEAR: ABNORMAL
PERFORMED ON: ABNORMAL
PERFORMED ON: NORMAL
PLATELET # BLD AUTO: 196 K/UL (ref 135–450)
PMV BLD AUTO: 7.8 FL (ref 5–10.5)
POTASSIUM SERPL-SCNC: 3.6 MMOL/L (ref 3.5–5.1)
RBC # BLD AUTO: 3.47 M/UL (ref 4.2–5.9)
SODIUM SERPL-SCNC: 136 MMOL/L (ref 136–145)
WBC # BLD AUTO: 5.9 K/UL (ref 4–11)

## 2023-05-19 PROCEDURE — 2580000003 HC RX 258

## 2023-05-19 PROCEDURE — 85025 COMPLETE CBC W/AUTO DIFF WBC: CPT

## 2023-05-19 PROCEDURE — G0378 HOSPITAL OBSERVATION PER HR: HCPCS

## 2023-05-19 PROCEDURE — 97530 THERAPEUTIC ACTIVITIES: CPT

## 2023-05-19 PROCEDURE — 96376 TX/PRO/DX INJ SAME DRUG ADON: CPT

## 2023-05-19 PROCEDURE — 97535 SELF CARE MNGMENT TRAINING: CPT

## 2023-05-19 PROCEDURE — 97161 PT EVAL LOW COMPLEX 20 MIN: CPT

## 2023-05-19 PROCEDURE — 6370000000 HC RX 637 (ALT 250 FOR IP)

## 2023-05-19 PROCEDURE — 6360000002 HC RX W HCPCS

## 2023-05-19 PROCEDURE — 36415 COLL VENOUS BLD VENIPUNCTURE: CPT

## 2023-05-19 PROCEDURE — 83735 ASSAY OF MAGNESIUM: CPT

## 2023-05-19 PROCEDURE — 97116 GAIT TRAINING THERAPY: CPT

## 2023-05-19 PROCEDURE — 96375 TX/PRO/DX INJ NEW DRUG ADDON: CPT

## 2023-05-19 PROCEDURE — 80048 BASIC METABOLIC PNL TOTAL CA: CPT

## 2023-05-19 PROCEDURE — 97165 OT EVAL LOW COMPLEX 30 MIN: CPT

## 2023-05-19 RX ORDER — OXYCODONE HYDROCHLORIDE 5 MG/1
10 TABLET ORAL EVERY 4 HOURS PRN
Status: DISCONTINUED | OUTPATIENT
Start: 2023-05-19 | End: 2023-05-19 | Stop reason: HOSPADM

## 2023-05-19 RX ORDER — SENNA PLUS 8.6 MG/1
1 TABLET ORAL 2 TIMES DAILY
Status: DISCONTINUED | OUTPATIENT
Start: 2023-05-19 | End: 2023-05-19 | Stop reason: HOSPADM

## 2023-05-19 RX ORDER — LIDOCAINE 4 G/G
1 PATCH TOPICAL DAILY
Status: DISCONTINUED | OUTPATIENT
Start: 2023-05-19 | End: 2023-05-19 | Stop reason: HOSPADM

## 2023-05-19 RX ORDER — OXYCODONE HYDROCHLORIDE 5 MG/1
5 TABLET ORAL EVERY 6 HOURS PRN
Status: DISCONTINUED | OUTPATIENT
Start: 2023-05-19 | End: 2023-05-19 | Stop reason: HOSPADM

## 2023-05-19 RX ORDER — OXYCODONE HCL 10 MG/1
10 TABLET, FILM COATED, EXTENDED RELEASE ORAL EVERY 12 HOURS SCHEDULED
Status: DISCONTINUED | OUTPATIENT
Start: 2023-05-19 | End: 2023-05-19 | Stop reason: HOSPADM

## 2023-05-19 RX ORDER — HYDROCODONE BITARTRATE AND ACETAMINOPHEN 5; 325 MG/1; MG/1
2 TABLET ORAL EVERY 6 HOURS PRN
Qty: 24 TABLET | Refills: 0 | Status: SHIPPED | OUTPATIENT
Start: 2023-05-19 | End: 2023-05-22

## 2023-05-19 RX ADMIN — HYDROMORPHONE HYDROCHLORIDE 0.25 MG: 1 INJECTION, SOLUTION INTRAMUSCULAR; INTRAVENOUS; SUBCUTANEOUS at 00:01

## 2023-05-19 RX ADMIN — PREDNISONE 5 MG: 5 TABLET ORAL at 07:47

## 2023-05-19 RX ADMIN — SENNOSIDES 8.6 MG: 8.6 TABLET, FILM COATED ORAL at 11:56

## 2023-05-19 RX ADMIN — SODIUM CHLORIDE, PRESERVATIVE FREE 10 ML: 5 INJECTION INTRAVENOUS at 07:49

## 2023-05-19 RX ADMIN — OXYCODONE HYDROCHLORIDE 10 MG: 10 TABLET, FILM COATED, EXTENDED RELEASE ORAL at 11:56

## 2023-05-19 RX ADMIN — PANTOPRAZOLE SODIUM 40 MG: 40 TABLET, DELAYED RELEASE ORAL at 06:22

## 2023-05-19 RX ADMIN — ABIRATERONE ACETATE 1000 MG: 250 TABLET ORAL at 07:21

## 2023-05-19 RX ADMIN — HYDROMORPHONE HYDROCHLORIDE 0.25 MG: 1 INJECTION, SOLUTION INTRAMUSCULAR; INTRAVENOUS; SUBCUTANEOUS at 06:30

## 2023-05-19 RX ADMIN — HYDROMORPHONE HYDROCHLORIDE 0.25 MG: 1 INJECTION, SOLUTION INTRAMUSCULAR; INTRAVENOUS; SUBCUTANEOUS at 10:39

## 2023-05-19 ASSESSMENT — PAIN DESCRIPTION - DESCRIPTORS
DESCRIPTORS: ACHING;BURNING
DESCRIPTORS: ACHING
DESCRIPTORS: ACHING

## 2023-05-19 ASSESSMENT — PAIN DESCRIPTION - ORIENTATION
ORIENTATION: MID
ORIENTATION: RIGHT;LEFT

## 2023-05-19 ASSESSMENT — PAIN - FUNCTIONAL ASSESSMENT
PAIN_FUNCTIONAL_ASSESSMENT: PREVENTS OR INTERFERES SOME ACTIVE ACTIVITIES AND ADLS
PAIN_FUNCTIONAL_ASSESSMENT: ACTIVITIES ARE NOT PREVENTED
PAIN_FUNCTIONAL_ASSESSMENT: ACTIVITIES ARE NOT PREVENTED
PAIN_FUNCTIONAL_ASSESSMENT: PREVENTS OR INTERFERES SOME ACTIVE ACTIVITIES AND ADLS

## 2023-05-19 ASSESSMENT — PAIN DESCRIPTION - PAIN TYPE
TYPE: ACUTE PAIN

## 2023-05-19 ASSESSMENT — PAIN DESCRIPTION - LOCATION
LOCATION: BACK
LOCATION: BACK;SACRUM

## 2023-05-19 ASSESSMENT — PAIN SCALES - GENERAL
PAINLEVEL_OUTOF10: 0
PAINLEVEL_OUTOF10: 5
PAINLEVEL_OUTOF10: 2
PAINLEVEL_OUTOF10: 3
PAINLEVEL_OUTOF10: 4
PAINLEVEL_OUTOF10: 0
PAINLEVEL_OUTOF10: 0
PAINLEVEL_OUTOF10: 2

## 2023-05-19 ASSESSMENT — PAIN DESCRIPTION - FREQUENCY
FREQUENCY: CONTINUOUS
FREQUENCY: CONTINUOUS

## 2023-05-19 NOTE — TELEPHONE ENCOUNTER
Rx Request    Does not have a lot left and would like some before the weekend, is in a lot of pain. Please call and advise.     HYDROcodone-acetaminophen (Olive [place]) 5-325 MG per tablet     Ladi Minaya [id number] [facility], [address] [place] [address],[address] - F [phone]

## 2023-05-19 NOTE — PLAN OF CARE
Problem: Safety - Adult  Goal: Free from fall injury  Outcome: Progressing  Flowsheets (Taken 5/19/2023 6505)  Free From Fall Injury:   Instruct family/caregiver on patient safety   Based on caregiver fall risk screen, instruct family/caregiver to ask for assistance with transferring infant if caregiver noted to have fall risk factors     Problem: Pain  Goal: Verbalizes/displays adequate comfort level or baseline comfort level  Outcome: Progressing

## 2023-05-19 NOTE — PLAN OF CARE
Problem: Safety - Adult  Goal: Free from fall injury  5/19/2023 0847 by Machelle Riojas RN  Outcome: Progressing  5/19/2023 0536 by Chandler Blanton RN  Outcome: Progressing  Flowsheets (Taken 5/19/2023 0533)  Free From Fall Injury:   Instruct family/caregiver on patient safety   Based on caregiver fall risk screen, instruct family/caregiver to ask for assistance with transferring infant if caregiver noted to have fall risk factors     Problem: Pain  Goal: Verbalizes/displays adequate comfort level or baseline comfort level  5/19/2023 0847 by Machelle Riojas RN  Outcome: Progressing  5/19/2023 0536 by Chandler Blanton RN  Outcome: Progressing     Problem: Skin/Tissue Integrity  Goal: Absence of new skin breakdown  Description: 1. Monitor for areas of redness and/or skin breakdown  2. Assess vascular access sites hourly  3. Every 4-6 hours minimum:  Change oxygen saturation probe site  4. Every 4-6 hours:  If on nasal continuous positive airway pressure, respiratory therapy assess nares and determine need for appliance change or resting period.   Outcome: Progressing

## 2023-05-19 NOTE — CARE COORDINATION
Case Management Assessment  Initial Evaluation    Date/Time of Evaluation: 5/19/2023 9:23 AM  Assessment Completed by: MAKEDA Miranda   for [address] [place] [address]  Office Phone: [phone]  [address] [facility] Mobile: [phone]        If patient is discharged prior to next notation, then this note serves as note for discharge by case management. Patient Name: Neema Burns                   YOB: 1950  Diagnosis: Back pain, subacute [M54.9]  Prostate cancer metastatic to bone [facility]) [C61, C79.51]                   Date / Time: 5/18/2023 12:55 PM    Patient Admission Status: Inpatient   Readmission Risk (Low < 19, Mod (19-27), High > 27): Readmission Risk Score: 12.6    Current PCP: Lauryn Alcazar MD  PCP verified by CM? Yes    Chart Reviewed: Yes      History Provided by: Patient  Patient Orientation: Alert and Oriented    Patient Cognition: Alert    Hospitalization in the last 30 days (Readmission):  No    If yes, Readmission Assessment in CM Navigator will be completed. Advance Directives:      Code Status: Full Code   Patient's Primary Decision Maker is: Legal Next of Kin    Primary Decision Maker: Chandrika Arciniega Brother/Sister - [phone]    Discharge Planning:    Patient lives with: Alone Type of Home: House  Primary Care Giver: Self  Patient Support Systems include: Family Members   Current Financial resources: Medicare ([place] medicare)  Current community resources: None  Current services prior to admission: Other (Comment) ([facility])            Current DME:              Type of Home Care services:  None    ADLS  Prior functional level: Independent in ADLs/IADLs  Current functional level: Independent in ADLs/IADLs    PT AM-PAC:   /24  OT AM-PAC:   /24    Family can provide assistance at DC: Yes  Would you like Case Management to discuss the discharge plan with any other family members/significant others, and if so, who?  Yes  Plans to Return to

## 2023-05-19 NOTE — ACP (ADVANCE CARE PLANNING)
Advance Care Planning     General Advance Care Planning (ACP) Conversation    Date of Conversation: 5/18/2023  Conducted with: Patient with Decision Making Capacity    Healthcare Decision Maker:    Primary Decision Maker: Hector Guillory - Brother/Sister - [phone]  Click here to complete Healthcare Decision Makers including selection of the Healthcare Decision Maker Relationship (ie \"Primary\"). Pt declined ACP conversation. He stated he is working with his  in regards to ACP documents.      Length of Voluntary ACP Conversation in minutes:  <16 minutes (Non-Billable)    Mendy ByMAKEDA Mccabe   for [address] ([address])  Office Phone: [phone]  [facility]: [phone]

## 2023-05-19 NOTE — PROGRESS NOTES
4 Eyes Skin Assessment       NAME:  Isai Pardo OF BIRTH:  1950  MEDICAL RECORD NUMBER:  [id number]       The patient is being assessed for   Admission       I agree that One RN has performed a thorough Head to Toe Skin Assessment on the patient. ALL assessment sites listed below have been assessed. Areas assessed by both nurses:       Head, Face, Ears, Shoulders, Back, Chest, Arms, Elbows, Hands, Sacrum. Buttock, Coccyx, Ischium, Legs. Feet and Heels, Under Medical Devices , and Other                                  Does the Patient have a Wound?  No noted wound(s)        Reddy Prevention initiated by RN: Yes   Wound Care Orders initiated by RN: NA       Pressure Injury (Stage 3,4, Unstageable, DTI, NWPT, and Complex wounds) if present, place referral order by RN under : NA       New and Established Ostomies, if present place, referral order under : NA        Nurse 1 eSignature: Electronically signed by Michael Mirza RN on 5/18/23 at 6:18 PM EDT       **SHARE this note so that the co-signing nurse can place an eSignature**       Nurse 2 eSignature: Electronically signed by Ce Roman RN on 5/18/23 at 6:20 PM EDT
Occupational Therapy  Facility/Department: [facility]  Occupational Therapy Initial Assessment/Treatment    Name: Ken Newman  : 1950  MRN: [id number]  Date of Service: 2023    Discharge Recommendations:  Ken Newman scored a 21/24 on the AM-PAC ADL Inpatient form. Current research shows that an AM-PAC score of 18 or greater is typically associated with a discharge to the patient's home setting. Please see assessment section for further patient specific details. If patient discharges prior to next session this note will serve as a discharge summary. Please see below for the latest assessment towards goals. OT Equipment Recommendations  Equipment Needed: Yes  Other: Pt interested in sock-aid and reacher. Discussed with pt where to obtain these       Patient Diagnosis(es): The encounter diagnosis was Prostate cancer metastatic to bone [facility]). Past Medical History:  has a past medical history of Cancer ([facility] [address] CHF (congestive heart failure) ([facility] [address] Dupuytrens contracture, Foot pain, left, GERD (gastroesophageal reflux disease), Migraine, Mole (skin), Nocturia, Reflux esophagitis, Shoulder pain, and Sinusitis. Past Surgical History:  has a past surgical history that includes eye surgery (); Finger trigger release (Left, 2013); Prostate surgery (10/2013); Colonoscopy ( & ); Colonoscopy (2018); Endoscopy, colon, diagnostic; and Tonsillectomy. Treatment Diagnosis: impaired ADLs and functional mobility/transfers      Assessment   Performance deficits / Impairments: Decreased functional mobility ; Decreased ADL status; Decreased balance;Decreased endurance  Assessment: Pt is a 73 y/o M who presents below functional baseline. Pt is from home alone and reports being independent w/ all ADLs, IADLs, transfers, and funcitonal mobility w/o AD pta. Currently, pt requires SBA for functional mobility and transfers d/t increased low back pain.  Pt also demo LE
Physical Therapy  Facility/Department: [address]  Physical Therapy Initial Assessment/Treatment    Name: Elly Rankin  : 1950  MRN: [id number]  Date of Service: 2023    Discharge Recommendations:    Elly Rankin scored a 18/24 on the AM-PAC short mobility form. Current research shows that an AM-PAC score of 18 or greater is typically associated with a discharge to the patient's home setting. Based on the patient's AM-PAC score and their current functional mobility deficits, it is recommended that the patient have 2-3 sessions per week of Physical Therapy at d/c to increase the patient's independence. Please see assessment section for further patient specific details. If patient discharges prior to next session this note will serve as a discharge summary. Please see below for the latest assessment towards goals. PT Equipment Recommendations  Equipment Needed:  (cont to assess)      Patient Diagnosis(es): The encounter diagnosis was Prostate cancer metastatic to bone [facility]). Past Medical History:  has a past medical history of Cancer ([facility] [address] CHF (congestive heart failure) ([facility] [address] Dupuytrens contracture, Foot pain, left, GERD (gastroesophageal reflux disease), Migraine, Mole (skin), Nocturia, Reflux esophagitis, Shoulder pain, and Sinusitis. Past Surgical History:  has a past surgical history that includes eye surgery (); Finger trigger release (Left, 2013); Prostate surgery (10/2013); Colonoscopy ( & ); Colonoscopy (2018); Endoscopy, colon, diagnostic; and Tonsillectomy. Assessment   Body Structures, Functions, Activity Limitations Requiring Skilled Therapeutic Intervention: Decreased functional mobility   Assessment: Pt requires SBA for all mobility. Bed mobility & transfers are effortful. Required education on techniques, posture, positioning to minimize back pain. Recommend reinforcement.  Will cont PT while here to maximize independence, as pt
Dupuytren's contracture of right hand    Sciatica of left side    Stage 3a chronic kidney disease (HCC)    BRCA positive    Back pain, subacute       ASSESSMENT AND PLAN:    Mr. 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 He is currently admitted for pain control for severe lumbar sacral back pain. Plans are for him to be discharged later today. He was already simulated for palliative radiation to the low back during this admission. Plans are for him to start a 10 fraction course of palliative radiation starting next Monday, 5/22/2023. Recommendations have been made for him to continue hydrocortisone-acetaminophen 5-325 mg, 2 tablets every 4-6 hours, as well as dexamethasone 4 mg 3 times daily.             Derrick Cortez MD  Please contact through [address]

## 2023-05-22 ENCOUNTER — APPOINTMENT (OUTPATIENT)
Dept: NUCLEAR MEDICINE | Age: 73
DRG: 543 | End: 2023-05-22
Payer: MEDICARE

## 2023-05-22 ENCOUNTER — CARE COORDINATION (OUTPATIENT)
Dept: CASE MANAGEMENT | Age: 73
End: 2023-05-22

## 2023-05-22 DIAGNOSIS — M54.9 BACK PAIN, SUBACUTE: Primary | ICD-10-CM

## 2023-05-22 PROCEDURE — 1111F DSCHRG MED/CURRENT MED MERGE: CPT | Performed by: INTERNAL MEDICINE

## 2023-05-22 NOTE — CARE COORDINATION
[address] Care Transitions Initial Follow Up Call    Call within 2 business days of discharge: Yes    Patient Current Location:  Home: [address] [place] [address] [address] [place] [address] Coordinator contacted the patient by telephone to perform post hospital discharge assessment. Verified name and  with patient as identifiers. Provided introduction to self, and explanation of the LPN Care Coordinator role. Patient: Samaria Genao Patient : 1950   MRN: [id number]  Reason for Admission: Back pain, subacute  Discharge Date: 23 RARS: Readmission Risk Score: 13.6      Last Discharge [address]       Date Complaint Diagnosis Description Type Department Provider    23 Back Pain Prostate cancer metastatic to bone [facility]) ED to Hosp-Admission (Discharged) (ADMITTED) [facility] Melanie Junior MD; Lance Barnes,... Was this an external facility discharge? No Discharge Facility: [facility] to be reviewed by the provider   Additional needs identified to be addressed with provider: No  none               Method of communication with provider: none. Spoke to patient. He reports he is feeling ok. He has pain in his thigh when he walks. Appetite and fluid intake is good, but varies sometimes. No urinary or bowel issues. He denies cough, fever, chills, cp or fatigue. He is sleeping well and able to get out of bed better. No home care needed. Medication review and 1111f completed. Has f/u scheduled. No needs at this time. Patient agreeable to future calls. LPN Care Coordinator reviewed discharge instructions, medical action plan, and red flags with patient who verbalized understanding. The patient was given an opportunity to ask questions and does not have any further questions or concerns at this time. Were discharge instructions available to patient? Yes.  Reviewed appropriate site of care based on symptoms and resources available to patient including:

## 2023-05-25 LAB
ALBUMIN SERPL-MCNC: 4 G/DL (ref 3.5–5.2)
ALP BLD-CCNC: 223 U/L (ref 53–128)
ALT SERPL-CCNC: 34 U/L (ref 0–45)
AST SERPL-CCNC: 74 U/L (ref 5–34)
BILIRUB SERPL-MCNC: 0.6 MG/DL (ref 0.1–1.2)
BUN / CREAT RATIO: 25.8 RATIO (ref 9–28)
BUN BLDV-MCNC: 31 MG/DL (ref 6–20)
CALCIUM SERPL-MCNC: 9.8 MG/DL (ref 8.6–10.3)
CHLORIDE BLD-SCNC: 95 MMOL/L (ref 97–107)
CO2: 29 MMOL/L (ref 21–31)
CREAT SERPL-MCNC: 1.2 MG/DL (ref 0.7–1.2)
GLUCOSE: 98 MG/DL (ref 74–106)
POTASSIUM SERPL-SCNC: 4.5 MMOL/L (ref 3.6–5)
PSA, TOTAL: <0.01 NG/ML (ref 0–4)
SODIUM BLD-SCNC: 132 MMOL/L (ref 135–145)
TOTAL PROTEIN: 6.4 G/DL (ref 6.4–8.3)

## 2023-05-26 ENCOUNTER — CARE COORDINATION (OUTPATIENT)
Dept: CASE MANAGEMENT | Age: 73
End: 2023-05-26

## 2023-05-26 NOTE — CARE COORDINATION
your medications changed?: No  Do you have any questions related to your medications?: No  Do you currently have any active services?: No  Do you have any needs or concerns that I can assist you with?: Yes  Patient-reported Needs or Concerns: assist with chores, PT  Identified Barriers: Other  Care Transitions Interventions  Other Interventions:             LPN Care Coordinator provided contact information for future needs. Plan for follow-up call in 5-7 days based on severity of symptoms and risk factors.   Plan for next call: symptom management-monitors/s  referrals-any response      [facility] Coordinator  [phone]

## 2023-05-31 ENCOUNTER — CARE COORDINATION (OUTPATIENT)
Dept: CARE COORDINATION | Age: 73
End: 2023-05-31

## 2023-05-31 NOTE — CARE COORDINATION
Attempt to initiate SW referral for housekeeping assistance. Unable to reach, left detailed message.

## 2023-06-02 ENCOUNTER — HOSPITAL ENCOUNTER (OUTPATIENT)
Dept: CT IMAGING | Age: 73
Discharge: HOME | End: 2023-06-02
Attending: INTERNAL MEDICINE
Payer: MEDICARE

## 2023-06-02 ENCOUNTER — CARE COORDINATION (OUTPATIENT)
Dept: CASE MANAGEMENT | Age: 73
End: 2023-06-02

## 2023-06-02 VITALS
DIASTOLIC BLOOD PRESSURE: 65 MMHG | SYSTOLIC BLOOD PRESSURE: 125 MMHG | OXYGEN SATURATION: 100 % | RESPIRATION RATE: 18 BRPM | TEMPERATURE: 97 F | HEART RATE: 85 BPM

## 2023-06-02 DIAGNOSIS — C61 MALIGNANT NEOPLASM OF PROSTATE (HCC): ICD-10-CM

## 2023-06-02 LAB
APTT BLD: 23.8 SEC (ref 22.7–35.9)
INR PPP: 1.07 (ref 0.84–1.16)
PLATELET # BLD AUTO: 188 K/UL (ref 135–450)
PROTHROMBIN TIME: 13.9 SEC (ref 11.5–14.8)

## 2023-06-02 PROCEDURE — 85610 PROTHROMBIN TIME: CPT

## 2023-06-02 PROCEDURE — 6370000000 HC RX 637 (ALT 250 FOR IP): Performed by: STUDENT IN AN ORGANIZED HEALTH CARE EDUCATION/TRAINING PROGRAM

## 2023-06-02 PROCEDURE — 85730 THROMBOPLASTIN TIME PARTIAL: CPT

## 2023-06-02 PROCEDURE — 99152 MOD SED SAME PHYS/QHP 5/>YRS: CPT

## 2023-06-02 PROCEDURE — 36415 COLL VENOUS BLD VENIPUNCTURE: CPT

## 2023-06-02 PROCEDURE — 85049 AUTOMATED PLATELET COUNT: CPT

## 2023-06-02 RX ORDER — FENTANYL CITRATE 50 UG/ML
INJECTION, SOLUTION INTRAMUSCULAR; INTRAVENOUS PRN
Status: DISCONTINUED | OUTPATIENT
Start: 2023-06-02 | End: 2023-06-03 | Stop reason: HOSPADM

## 2023-06-02 RX ORDER — HYDROCODONE BITARTRATE AND ACETAMINOPHEN 5; 325 MG/1; MG/1
1 TABLET ORAL ONCE
Status: COMPLETED | OUTPATIENT
Start: 2023-06-02 | End: 2023-06-02

## 2023-06-02 RX ORDER — MIDAZOLAM HYDROCHLORIDE 1 MG/ML
INJECTION INTRAMUSCULAR; INTRAVENOUS PRN
Status: DISCONTINUED | OUTPATIENT
Start: 2023-06-02 | End: 2023-06-03 | Stop reason: HOSPADM

## 2023-06-02 RX ORDER — LIDOCAINE HYDROCHLORIDE 10 MG/ML
INJECTION, SOLUTION EPIDURAL; INFILTRATION; INTRACAUDAL; PERINEURAL PRN
Status: DISCONTINUED | OUTPATIENT
Start: 2023-06-02 | End: 2023-06-03 | Stop reason: HOSPADM

## 2023-06-02 RX ADMIN — MIDAZOLAM HYDROCHLORIDE 1 MG: 1 INJECTION INTRAMUSCULAR; INTRAVENOUS at 09:45

## 2023-06-02 RX ADMIN — FENTANYL CITRATE 25 MCG: 50 INJECTION, SOLUTION INTRAMUSCULAR; INTRAVENOUS at 09:45

## 2023-06-02 RX ADMIN — LIDOCAINE HYDROCHLORIDE 10 ML: 10 INJECTION, SOLUTION EPIDURAL; INFILTRATION; INTRACAUDAL; PERINEURAL at 09:49

## 2023-06-02 RX ADMIN — FENTANYL CITRATE 25 MCG: 50 INJECTION, SOLUTION INTRAMUSCULAR; INTRAVENOUS at 10:06

## 2023-06-02 RX ADMIN — HYDROCODONE BITARTRATE AND ACETAMINOPHEN 1 TABLET: 5; 325 TABLET ORAL at 11:35

## 2023-06-02 ASSESSMENT — PAIN SCALES - GENERAL
PAINLEVEL_OUTOF10: 4
PAINLEVEL_OUTOF10: 2

## 2023-06-02 ASSESSMENT — PAIN DESCRIPTION - ORIENTATION
ORIENTATION: MID;LOWER
ORIENTATION: RIGHT

## 2023-06-02 ASSESSMENT — PAIN DESCRIPTION - PAIN TYPE
TYPE: ACUTE PAIN
TYPE: CHRONIC PAIN
TYPE: ACUTE PAIN
TYPE: ACUTE PAIN

## 2023-06-02 ASSESSMENT — PAIN DESCRIPTION - DESCRIPTORS
DESCRIPTORS: SORE
DESCRIPTORS: SORE
DESCRIPTORS: ACHING
DESCRIPTORS: SORE
DESCRIPTORS: SORE

## 2023-06-02 ASSESSMENT — PAIN DESCRIPTION - FREQUENCY
FREQUENCY: INTERMITTENT
FREQUENCY: INTERMITTENT
FREQUENCY: CONTINUOUS
FREQUENCY: CONTINUOUS

## 2023-06-02 ASSESSMENT — PAIN DESCRIPTION - LOCATION
LOCATION: ABDOMEN
LOCATION: BACK
LOCATION: ABDOMEN
LOCATION: ABDOMEN

## 2023-06-02 ASSESSMENT — PAIN - FUNCTIONAL ASSESSMENT: PAIN_FUNCTIONAL_ASSESSMENT: 0-10

## 2023-06-02 NOTE — SEDATION DOCUMENTATION
IMAGING SERVICES NURSING PROGRESS NOTE    Procedure:  Liver Bx  June 2, 2023  Bess Nick      Allergies:  No Known Allergies    Vitals:    06/02/23 1015   BP: 118/70   Pulse: 74   Resp: 18   Temp:    SpO2: 99%       Recent lab work reviewed with MD: yes   Procedure explained to patient by MD: yes   Informed consent obtained:yes  Family with patient: Yes    Mental Status:  Normal  Readiness to learn:  Yes  Barriers to learning: No    Pain Assessment Pre-Procedure:  Pain Present:  yes  Pain Score:  6  Pain Quality/Description:  Sore    Time out Procedure Verification with:  [x] RN  [x] Physician  [x] Patient  [x] Other: CT Technologist  Procedure site marked, if applicable:  Yes    Note: Patient arrived A & O x 4, denies pain, breathing easily on room air, Spoke to Dr. Merle Diaz prior to procedure. Procedural sedation:  Fentanyl:  50 mcg  Versed:    1 mg  Post Procedureal Note:  Patient tolerated procedure well. Breathing easily on room air. Report given to Rich Peters RN. Patient transported in stable conditon to room 4.     Pain Assessment Post-Procedure:  Pain Present:  yes  Pain Score:  4  Pain Quality/Description:  sore    Plan of Care Goals:  Safety measures met:  Yes  Patient understands explanation of procedure:  Yes    Time in:  945  Time out:  Leno De Guzman RN.  RPreetNPreet 6/2/2023

## 2023-06-02 NOTE — PROGRESS NOTES
Ambulatory Surgery/Procedure Discharge Note    Vitals:    06/02/23 1300   BP: 125/65   Pulse: 85   Resp: 18   Temp:    SpO2: 100%       In: 120 [P.O.:120]  Out: -     Restroom use offered before discharge. Yes    Pain assessment:  present - adequately treated  Pain Level: 4    Pt alert and oriented x4. IV removed. Denies N/V or pain. Right abdominal dressing-C,D,I. Discharge instructions given to pt and friend with pt permission. Pt and friend verbalized understanding of all instructions. Left with all belongings and discharge instructions. Patient discharged. Patient taken to radiation for appointment at [phone] via wheelchair.

## 2023-06-02 NOTE — PROCEDURES
Interventional Radiology Post Procedure    Date: 6/2/2023    Physician: Edgar Hazel MD    Pre-op Diagnosis: prostate cancer, liver lesions    Post-op Diagnosis: same    Variation from Planned Procedure: None       Findings: successful percutaneous biopsy of a liver lesion    Patient condition: Stable    Estimated Blood Loss: 3 cc    Specimens:  18G core x3      Signed,  Fredy Terry MD  10:08 AM  6/2/2023

## 2023-06-02 NOTE — CARE COORDINATION
[facility] Transitions Initial Follow Up Call    Call within 2 business days of discharge: Yes    Patient:  Myra Luevano   Patient :  1950  MRN:  [id number]     Reason for Admission: Back Pain  Discharge Date:  23    RARS:       Transitions of Care Initial Call    Was this an external facility discharge? Discharge Facility: [address] [place] [address] to be reviewed by the provider   Additional needs identified to be addressed with provider:    smooth    AMB CC Provider Discharge Needs: none            Non-face-to-face services provided:    1ST CTC attempt to reach Pt regarding recent hospital discharge. CTC left voice recording with call back number requesting a call back.     Follow up appointments:    Future Appointments   Date Time Provider Andrew Lugo   2023 10:00 AM [facility]  [place] [facility]     Thank You,    Kevin Trejo RN  Care Transition Coordinator  Contact Number:[phone]

## 2023-06-02 NOTE — DISCHARGE INSTRUCTIONS
drive when taking these medications. []You may take a non-prescription headache remedy, preferably one that does not contain aspirin. [x]Give the list of your medications to your primary care physician on your next visit. Keep your med  list updated and carry it with in case of emergencies. [x] If you had to stop medicines before the procedure, ask your doctor when you can             start again. Medicines often stopped include:    Anti-inflammatory drugs (eg, aspirin )     Blood thinners like clopidogrel (Plavix) or warfarin (Coumadin)     FOLLOW-UP CARE:  [x]Call the office for follow-up appointment. Physician Dr. Stefani Delcid      The above instructions were reviewed with patient/significant other. The following additional patient specific information was reviewed with the patient/significant other:    [x]Procedure/physician specific instructions:        LIVER BIOPSY DISCHARGEINSTRUCTIONS    []Medication information sheet(s)    []Catheter associated blood stream infections  []Other     I have read and understand the instructions given to me:      ____________________________________________   (Patient/S.O. Signature)           Nurse Paris Smith RN ,R.N. Date/time 6/2/2023 11:44 AM           SAME DAY SERVICES:  [phone]  (Mon-Fri 6am-5pm)        If you smoke STOP. We care about your health!

## 2023-06-02 NOTE — H&P
ophthalmic solution Place 1 drop into the right eye nightly      omeprazole (PRILOSEC) 20 MG capsule TAKE 1 CAPSULE DAILY (Patient taking differently: 1 capsule Daily TAKE 1 CAPSULE DAILY) 90 capsule 0     No current facility-administered medications on file prior to encounter.        Current Meds  fentaNYL (SUBLIMAZE) injection, PRN  midazolam (VERSED) injection, PRN  lidocaine PF 1 % injection, PRN          ASA 2 - Patient with mild systemic disease with no functional limitations    II (soft palate, uvula, fauces visible)    Activity:  2 - Able to move 4 extremities voluntarily on command  Respiration:  2 - Able to breathe deeply and cough freely  Circulation:  2 - BP+/- 20mmHg of normal  Consciousness:  2 - Fully awake  Oxygen Saturation (color):  2 - Able to maintain oxygen saturation >92% on room air    Sedation : Moderate sedation planned

## 2023-06-08 ENCOUNTER — TRANSCRIBE ORDERS (OUTPATIENT)
Dept: ADMINISTRATIVE | Age: 73
End: 2023-06-08

## 2023-06-08 DIAGNOSIS — C61 PROSTATE CANCER (HCC): Primary | ICD-10-CM

## 2023-06-09 ENCOUNTER — CARE COORDINATION (OUTPATIENT)
Dept: CASE MANAGEMENT | Age: 73
End: 2023-06-09

## 2023-06-09 NOTE — CARE COORDINATION
[facility] Care Transitions Follow Up Call    Patient Current Location:      Home: [address] [place] [address]    Care Transition Nurse contacted the patient by telephone to perform post hospital discharge assessment. Verified name with patient as identifier. Provided introduction to self, and explanation of the Care Transition Nurse role. Patient: Wilbur Devries Patient :  1950  MRN: [id number]  Reason for Admission:  back pain  Discharge Date:    RARS: 14    Challenges to be reviewed by the provider   Additional needs identified to be addressed with provider:   yes - order to begin PT           Method of communication with provider : chart routing    SUMMARY  CTN spoke to the Pt who reports the following:    -C/O LL buttock and left thigh pain described as \"little pain\". Taking prescribed Norco and new medication Morphine PRN prescribed by medical oncologist.  -Some weakness but does not uses DME to ambulate. Cautioned to exercise caution when ambulating to prevent fall / injury. Reports he wants PT to get stronger. CTN agreeable to routing a note to PCP with pt request.   -CTN and pt discussed constipation d/t narc use. LBM was yesterday x3. Will continue to take Senokot-S as prescribed and discuss any bowel changes with physician.    -Denies nausea, vomiting, and abdominal pain. Reports increased acid reflux at night. CTN provided education (elevate head, avoid caffeine / spicy & acidic foods, and don't eat and directly lie down.  -Has seen radiation and medical oncologist since d/c home and they continue to follow. -PCP appt .  -Denies fever, chills, cough, congestion, SOB / DB, and chest pain. -Denies issues with fluid intake, appetite, and urination.  -Confirms he is taking all meds as prescribed.   -Has a friend of a friend who can assist with housekeeping but is not sure if they plan to charge. CTN provided information on COA and pt is interested.   CTN completed COA application

## 2023-06-13 RX ORDER — MORPHINE SULFATE 10 MG/1
5 CAPSULE, EXTENDED RELEASE ORAL DAILY
Status: ON HOLD | COMMUNITY
End: 2023-06-30 | Stop reason: CLARIF

## 2023-06-13 RX ORDER — MULTIVIT-MIN/IRON/FOLIC ACID/K 18-600-40
1 CAPSULE ORAL DAILY
Status: ON HOLD | COMMUNITY

## 2023-06-13 RX ORDER — M-VIT,TX,IRON,MINS/CALC/FOLIC 27MG-0.4MG
1 TABLET ORAL DAILY
Status: ON HOLD | COMMUNITY

## 2023-06-13 RX ORDER — CALCIUM CARBONATE 500(1250)
500 TABLET ORAL DAILY
Status: ON HOLD | COMMUNITY

## 2023-06-13 NOTE — PROGRESS NOTES
[facility] PRE-SURGICAL TESTING INSTRUCTIONS                      PRIOR TO PROCEDURE DATE:    1. PLEASE FOLLOW ANY INSTRUCTIONS GIVEN TO YOU PER YOUR SURGEON. 2. Arrange for someone to drive you home and be with you for the first 24 hours after discharge for your safety after your procedure for which you received sedation. Ensure it is someone we can share information with regarding your discharge. NOTE: At this time ONLY 2 ADULTS may accompany you   One person ENCOURAGED to stay at hospital entire time if outpatient surgery      3. You must contact your surgeon for instructions IF:  You are taking any blood thinners, aspirin, anti-inflammatory or vitamins. There is a change in your physical condition such as a cold, fever, rash, cuts, sores, or any other infection, especially near your surgical site. 4. Do not drink alcohol the day before or day of your procedure. Do not use any recreational marijuana at least 24 hours or street drugs (heroin, cocaine) at minimum 5 days prior to your procedure. 5. A Pre-Surgical History and Physical MUST be completed WITHIN 30 DAYS OR LESS prior to your procedure. by your Physician or an Urgent Care        THE DAY OF YOUR PROCEDURE:  1. Follow instructions for ARRIVAL TIME as DIRECTED BY YOUR SURGEON. 2. Enter the MAIN entrance from [place] and follow the signs to the free Parking [employer] or [employer] (offered free of charge 7 am-5pm). 3. Enter the Main Entrance of the hospital (do not enter from the lower level of the parking garage). Upon entrance, check in with the  at the surgical information desk on your LEFT. Bring your insurance card and photo ID to register      4. DO NOT EAT ANYTHING 8 hours prior to arrival for surgery. You may have up to 8 ounces of water 4 hours prior to your arrival for surgery.    NOTE: ALL Gastric, Bariatric & Bowel surgery patients - you MUST follow your surgeon's instructions regarding

## 2023-06-13 NOTE — PROGRESS NOTES
Place patient label inside box (if no patient label, complete below)  Name:  :  MR#:   Jeni Si / PROCEDURE  I (we), Horacio Mcclellan (Patient Name) authorize DR. Nahed Jaimes  (Provider / Luis Enrique Campbell) and/or such assistants as may be selected by him/her, to perform the following operation/procedure(s): PORT PLACEMENT        Note: If unable to obtain consent prior to an emergent procedure, document the emergent reason in the medical record. This procedure has been explained to my (our) satisfaction and included in the explanation was: The intended benefit, nature, and extent of the procedure to be performed; The significant risks involved and the probability of success; Alternative procedures and methods of treatment; The dangers and probable consequences of such alternatives (including no procedure or treatment); The expected consequences of the procedure on my future health; Whether other qualified individuals would be performing important surgical tasks and/or whether  would be present to advise or support the procedure. I (we) understand that there are other risks of infection and other serious complications in the pre-operative/procedural and postoperative/procedural stages of my (our) care. I (we) have asked all of the questions which I (we) thought were important in deciding whether or not to undergo treatment or diagnosis. These questions have been answered to my (our) satisfaction. I (we) understand that no assurance can be given that the procedure will be a success, and no guarantee or warranty of success has been given to me (us). It has been explained to me (us) that during the course of the operation/procedure, unforeseen conditions may be revealed that necessitate extension of the original procedure(s) or different procedure(s) than those set forth in Paragraph 1.  I (we) authorize and request that the ______________________________________________________________________________________________    If a phone consent is obtained, consent will be documented by using two health care professionals, each affirming that the consenting party has no questions and gives consent for the procedure discussed with the physician/provider.   _____________________          ____________________       _____/_____am/pm   2nd witness to phone consent        Printed name           Date / Time    Informed Consent:  I have provided the explanation described above in section 1 to the patient and/or legal representative.  I have provided the patient and/or legal representative with an opportunity to ask any questions about the proposed operation/procedure.   ___________________________          ____________________         ____/____am/pm  Provider / Proceduralist                            Printed name            Date / Time  Revised 8/2/2021                                                                      Page 2 of 2

## 2023-06-14 ENCOUNTER — APPOINTMENT (OUTPATIENT)
Dept: GENERAL RADIOLOGY | Age: 73
End: 2023-06-14
Attending: SURGERY
Payer: MEDICARE

## 2023-06-14 ENCOUNTER — HOSPITAL ENCOUNTER (OUTPATIENT)
Age: 73
Setting detail: OUTPATIENT SURGERY
Discharge: HOME | End: 2023-06-14
Attending: SURGERY | Admitting: SURGERY
Payer: MEDICARE

## 2023-06-14 VITALS
WEIGHT: 127.8 LBS | HEIGHT: 69 IN | SYSTOLIC BLOOD PRESSURE: 123 MMHG | HEART RATE: 71 BPM | DIASTOLIC BLOOD PRESSURE: 70 MMHG | OXYGEN SATURATION: 100 % | RESPIRATION RATE: 16 BRPM | BODY MASS INDEX: 18.93 KG/M2 | TEMPERATURE: 97.2 F

## 2023-06-14 PROBLEM — C77.9 MALIGNANT NEOPLASM METASTATIC TO LYMPH NODES (HCC): Status: ACTIVE | Noted: 2023-06-14

## 2023-06-14 PROBLEM — G89.3 PAIN DUE TO NEOPLASM: Status: ACTIVE | Noted: 2023-06-14

## 2023-06-14 PROBLEM — K25.9 GASTRIC ULCER: Status: ACTIVE | Noted: 2023-06-14

## 2023-06-14 PROBLEM — N40.0 BENIGN PROSTATIC HYPERPLASIA: Status: ACTIVE | Noted: 2023-06-14

## 2023-06-14 PROBLEM — N52.9 MALE ERECTILE DYSFUNCTION, UNSPECIFIED: Status: ACTIVE | Noted: 2023-06-14

## 2023-06-14 PROBLEM — R30.0 DYSURIA: Status: ACTIVE | Noted: 2023-06-14

## 2023-06-14 PROBLEM — C78.7 MALIGNANT NEOPLASM METASTATIC TO LIVER (HCC): Status: ACTIVE | Noted: 2023-06-10

## 2023-06-14 PROBLEM — M54.9 BACKACHE: Status: ACTIVE | Noted: 2019-01-29

## 2023-06-14 PROBLEM — R39.15 URINARY URGENCY: Status: ACTIVE | Noted: 2023-06-14

## 2023-06-14 PROBLEM — H40.9 GLAUCOMA: Status: ACTIVE | Noted: 2023-06-14

## 2023-06-14 PROBLEM — R97.20 HIGH PROSTATE SPECIFIC ANTIGEN (PSA): Status: ACTIVE | Noted: 2023-01-01

## 2023-06-14 LAB
ALBUMIN SERPL-MCNC: 3.7 G/DL (ref 3.4–5)
ALBUMIN/GLOB SERPL: 1.4 {RATIO} (ref 1.1–2.2)
ALP SERPL-CCNC: 289 U/L (ref 40–129)
ALT SERPL-CCNC: 67 U/L (ref 10–40)
ANION GAP SERPL CALCULATED.3IONS-SCNC: 14 MMOL/L (ref 3–16)
AST SERPL-CCNC: 85 U/L (ref 15–37)
BILIRUB SERPL-MCNC: 0.4 MG/DL (ref 0–1)
BUN SERPL-MCNC: 30 MG/DL (ref 7–20)
CALCIUM SERPL-MCNC: 10.6 MG/DL (ref 8.3–10.6)
CHLORIDE SERPL-SCNC: 97 MMOL/L (ref 99–110)
CO2 SERPL-SCNC: 28 MMOL/L (ref 21–32)
CREAT SERPL-MCNC: 0.9 MG/DL (ref 0.8–1.3)
DEPRECATED RDW RBC AUTO: 14.5 % (ref 12.4–15.4)
GFR SERPLBLD CREATININE-BSD FMLA CKD-EPI: >60 ML/MIN/{1.73_M2}
GLUCOSE SERPL-MCNC: 109 MG/DL (ref 70–99)
HCT VFR BLD AUTO: 33.3 % (ref 40.5–52.5)
HGB BLD-MCNC: 11.3 G/DL (ref 13.5–17.5)
MCH RBC QN AUTO: 30.7 PG (ref 26–34)
MCHC RBC AUTO-ENTMCNC: 33.9 G/DL (ref 31–36)
MCV RBC AUTO: 90.5 FL (ref 80–100)
PLATELET # BLD AUTO: 55 K/UL (ref 135–450)
PMV BLD AUTO: 7.4 FL (ref 5–10.5)
POTASSIUM SERPL-SCNC: 4.5 MMOL/L (ref 3.5–5.1)
PROT SERPL-MCNC: 6.4 G/DL (ref 6.4–8.2)
RBC # BLD AUTO: 3.68 M/UL (ref 4.2–5.9)
SODIUM SERPL-SCNC: 139 MMOL/L (ref 136–145)
WBC # BLD AUTO: 3.7 K/UL (ref 4–11)

## 2023-06-14 PROCEDURE — 2500000003 HC RX 250 WO HCPCS: Performed by: SURGERY

## 2023-06-14 PROCEDURE — 6360000002 HC RX W HCPCS: Performed by: SURGERY

## 2023-06-14 PROCEDURE — 2709999900 HC NON-CHARGEABLE SUPPLY: Performed by: SURGERY

## 2023-06-14 PROCEDURE — 77001 FLUOROGUIDE FOR VEIN DEVICE: CPT

## 2023-06-14 PROCEDURE — 3600000012 HC SURGERY LEVEL 2 ADDTL 15MIN: Performed by: SURGERY

## 2023-06-14 PROCEDURE — 36561 INSERT TUNNELED CV CATH: CPT | Performed by: SURGERY

## 2023-06-14 PROCEDURE — 7100000001 HC PACU RECOVERY - ADDTL 15 MIN: Performed by: SURGERY

## 2023-06-14 PROCEDURE — 76937 US GUIDE VASCULAR ACCESS: CPT | Performed by: SURGERY

## 2023-06-14 PROCEDURE — 85027 COMPLETE CBC AUTOMATED: CPT

## 2023-06-14 PROCEDURE — 7100000000 HC PACU RECOVERY - FIRST 15 MIN: Performed by: SURGERY

## 2023-06-14 PROCEDURE — 3700000000 HC ANESTHESIA ATTENDED CARE: Performed by: SURGERY

## 2023-06-14 PROCEDURE — 3700000001 HC ADD 15 MINUTES (ANESTHESIA): Performed by: SURGERY

## 2023-06-14 PROCEDURE — 7100000010 HC PHASE II RECOVERY - FIRST 15 MIN: Performed by: SURGERY

## 2023-06-14 PROCEDURE — 80053 COMPREHEN METABOLIC PANEL: CPT

## 2023-06-14 PROCEDURE — C1788 PORT, INDWELLING, IMP: HCPCS | Performed by: SURGERY

## 2023-06-14 PROCEDURE — 71045 X-RAY EXAM CHEST 1 VIEW: CPT

## 2023-06-14 PROCEDURE — 3600000002 HC SURGERY LEVEL 2 BASE: Performed by: SURGERY

## 2023-06-14 PROCEDURE — 77001 FLUOROGUIDE FOR VEIN DEVICE: CPT | Performed by: SURGERY

## 2023-06-14 DEVICE — PORT INFUS SGL LUMN ATTCH POLYUR OPN END CATH 8FR POWERPRT: Type: IMPLANTABLE DEVICE | Status: FUNCTIONAL

## 2023-06-14 RX ORDER — DIPHENHYDRAMINE HYDROCHLORIDE 50 MG/ML
12.5 INJECTION INTRAMUSCULAR; INTRAVENOUS
Status: DISCONTINUED | OUTPATIENT
Start: 2023-06-14 | End: 2023-06-14 | Stop reason: HOSPADM

## 2023-06-14 RX ORDER — HYDRALAZINE HYDROCHLORIDE 20 MG/ML
10 INJECTION INTRAMUSCULAR; INTRAVENOUS
Status: DISCONTINUED | OUTPATIENT
Start: 2023-06-14 | End: 2023-06-14 | Stop reason: HOSPADM

## 2023-06-14 RX ORDER — BUPIVACAINE HYDROCHLORIDE 5 MG/ML
INJECTION, SOLUTION EPIDURAL; INTRACAUDAL PRN
Status: DISCONTINUED | OUTPATIENT
Start: 2023-06-14 | End: 2023-06-14 | Stop reason: ALTCHOICE

## 2023-06-14 RX ORDER — LABETALOL HYDROCHLORIDE 5 MG/ML
10 INJECTION, SOLUTION INTRAVENOUS
Status: DISCONTINUED | OUTPATIENT
Start: 2023-06-14 | End: 2023-06-14 | Stop reason: HOSPADM

## 2023-06-14 RX ORDER — MEPERIDINE HYDROCHLORIDE 25 MG/ML
12.5 INJECTION INTRAMUSCULAR; INTRAVENOUS; SUBCUTANEOUS EVERY 5 MIN PRN
Status: DISCONTINUED | OUTPATIENT
Start: 2023-06-14 | End: 2023-06-14 | Stop reason: HOSPADM

## 2023-06-14 RX ORDER — HEPARIN 100 UNIT/ML
SYRINGE INTRAVENOUS PRN
Status: DISCONTINUED | OUTPATIENT
Start: 2023-06-14 | End: 2023-06-14 | Stop reason: ALTCHOICE

## 2023-06-14 RX ORDER — HYDROMORPHONE HYDROCHLORIDE 1 MG/ML
0.5 INJECTION, SOLUTION INTRAMUSCULAR; INTRAVENOUS; SUBCUTANEOUS EVERY 10 MIN PRN
Status: DISCONTINUED | OUTPATIENT
Start: 2023-06-14 | End: 2023-06-14 | Stop reason: HOSPADM

## 2023-06-14 RX ORDER — HYDROMORPHONE HYDROCHLORIDE 1 MG/ML
0.5 INJECTION, SOLUTION INTRAMUSCULAR; INTRAVENOUS; SUBCUTANEOUS EVERY 5 MIN PRN
Status: DISCONTINUED | OUTPATIENT
Start: 2023-06-14 | End: 2023-06-14 | Stop reason: HOSPADM

## 2023-06-14 RX ORDER — SODIUM CHLORIDE 9 MG/ML
INJECTION, SOLUTION INTRAVENOUS PRN
Status: DISCONTINUED | OUTPATIENT
Start: 2023-06-14 | End: 2023-06-14 | Stop reason: HOSPADM

## 2023-06-14 RX ORDER — SODIUM CHLORIDE 0.9 % (FLUSH) 0.9 %
5-40 SYRINGE (ML) INJECTION EVERY 12 HOURS SCHEDULED
Status: DISCONTINUED | OUTPATIENT
Start: 2023-06-14 | End: 2023-06-14 | Stop reason: HOSPADM

## 2023-06-14 RX ORDER — METOCLOPRAMIDE HYDROCHLORIDE 5 MG/ML
10 INJECTION INTRAMUSCULAR; INTRAVENOUS
Status: DISCONTINUED | OUTPATIENT
Start: 2023-06-14 | End: 2023-06-14 | Stop reason: HOSPADM

## 2023-06-14 RX ORDER — SODIUM CHLORIDE 0.9 % (FLUSH) 0.9 %
5-40 SYRINGE (ML) INJECTION PRN
Status: DISCONTINUED | OUTPATIENT
Start: 2023-06-14 | End: 2023-06-14 | Stop reason: HOSPADM

## 2023-06-14 ASSESSMENT — PAIN DESCRIPTION - DESCRIPTORS: DESCRIPTORS: ACHING

## 2023-06-14 ASSESSMENT — PAIN - FUNCTIONAL ASSESSMENT
PAIN_FUNCTIONAL_ASSESSMENT: 0-10
PAIN_FUNCTIONAL_ASSESSMENT: ACTIVITIES ARE NOT PREVENTED

## 2023-06-14 NOTE — H&P
I have reviewed the H&P and examined this patient. I have reviewed with the patient and /or family the risks, benefits and alternatives to this procedure and they seem to understand and agree to proceed.   Travis Banerjee MD

## 2023-06-14 NOTE — PROGRESS NOTES
PACU Discharge Note  PORT PLACEMENT, RIGHT INTERNAL JUGULAR   Dr Turner Auguste  Current Allergies: Patient has no known allergies. Pt meets criteria for discharge to home per [facility] and ASPAN standards. Spoke with Dr Jayant Flores about Port placement X--ray results in. Okay to discharge. Discussed with patient and responsible individual receiving instructions how to measure pain per numerical scale and when to contact doctor if prescribed medications are not helping with post operative pain    Discharge instructions reviewed with patient and family. Both verbalized understanding of instructions. Gave patient and family opportunity to ask questions. All questions reviewed and answered. Documents signed and copy of discharge instructions given. Vitals:    06/14/23 1215   BP: 123/70   Pulse: 71   Resp: 16   Temp: 97.2 °F (36.2 °C)   SpO2: 100%            Intake/Output Summary (Last 24 hours) at 6/14/2023 1248  Last data filed at 6/14/2023 1230  Gross per 24 hour   Intake 347 ml   Output --   Net 347 ml     Tolerating pepsi    Pain assessment:  none  Pain Level: 2    Offered patient opportunity to use restroom prior to discharge.  Patient declined     Patient discharged to home/self care via wheel chair by RN with a responsible individual. Lg Lynne        6/14/2023 12:48 PM

## 2023-06-14 NOTE — DISCHARGE INSTRUCTIONS
Diet:   May resume regular diet    Wound Care:   Patient may shower. Do not soak or scrub area of incision for 7-10 days. Tissue adhesive will peel off within 10 days. If tissue adhesive does not peel off in 10 days, may use petroleum jelly to remove. [facility] will care for and maintain your dressing, this is to remain in place. Activity:   May resume regular activity    Pain management:   Please take over the counter tylenol or ibuprofen for pain relief as needed. Return if:   Call/ Return to ED for increased redness, shortness of breath, worsening pain, drainage from wound, or fevers greater than 101.5 F. Please follow up with Dr. Rollen Sever as needed. Please call [phone] to make an appointment. PORT PLACEMENT, Valentina Hyde           6/14/2023  Current Allergies: NKDA    [facility]    There are potential side effects of anesthesia or sedation you may experience for the first 24 hours. These side effects include:    Confusion or Memory loss, Dizziness, or Delayed Reaction Times   [x]A responsible person should be with you for the next 24 hours. Do not operate any vehicles (automobiles, bicycles, motorcycles) or power tools or machinery for 24 hours. Do not sign any legal documents or make any legal decisions for 24 hours. Do not drink alcohol for 24 hours or while taking narcotic pain medication. Nausea/Diet  [x] Nausea is not uncommon after having general anesthesia. Start with light diet and progress to your normal diet as you feel like eating. However, if you experience nausea or repeated episodes of vomiting which persist beyond 12-24 hours, notify your physician. Once nausea has passed, remember to keep drinking fluids. Difficulty Passing Urine  [x]Drink extra amounts of fluid today. Notify your physician if you have not urinated within 8 hours after your procedure or you feel uncomfortable. hand rub before and after visiting you. If you do not see them clean their hands, ask them to clean their hands. What do I need to do when I go home from the hospital?  Before you go home, your doctor nurses should explain everything you need to know about taking care of your wound. Make sure you understand how to care for your wound before you leave the hospital.    Always clean your hands before and after caring for your wound. Before you go home, make sure you know who to contact if you have questions or problems after you get home. If you have any symptoms of an infection, such as redness and pain at the surgery site, drainage, or fever, call your doctor immediately. If you have additional questions, please ask your doctor or nurse.

## 2023-06-14 NOTE — PROGRESS NOTES
Geoffrey Beck    Chest X--ray  results:    FINDINGS: Right upper chest portable port with tip in the midsuperior vena cava. No effusion or consolidation. Normal heart size. No evidence of pneumothorax. No acute osseous abnormality. IMPRESSION:  1. Right upper chest power injectable port as described. No acute abnormality.

## 2023-06-14 NOTE — BRIEF OP NOTE
Brief Postoperative Note      Patient: Omar Brumfield  YOB: 1950  MRN: [id number]    Date of Procedure: 6/14/2023    Pre-Op Diagnosis Codes:     * Prostate cancer ([address] [facility] Amador[facility]ohana    Post-Op Diagnosis: Same       Procedure(s):  PORT PLACEMENT, RIGHT INTERNAL JUGULAR    Surgeon(s):  René Dillard MD    Assistant:  Surgical Assistant: Isabel Hsieholic  Resident: Criss Pham DO    Anesthesia: Monitor Anesthesia Care    Estimated Blood Loss (mL): Minimal    Complications: None    Specimens:   * No specimens in log *    Implants:  * No implants in log *      Drains: * No LDAs found *    Findings: Right internal jugular vein port-a-cath under US and with fluoroscopy with adequate placement. Will get a postop CXR to confirm no PTX.         Electronically signed by Daniel Gomez DO on 6/14/2023 at 11:07 AM

## 2023-06-21 ENCOUNTER — CARE COORDINATION (OUTPATIENT)
Dept: CASE MANAGEMENT | Age: 73
End: 2023-06-21

## 2023-06-21 NOTE — CARE COORDINATION
[facility] Transitions Initial Follow Up Call    Patient:  David Matthews Patient :  1950  MRN:  [id number]   Reason for Admission:  back pain   Discharge Date:  23  RARS: 14      Transitions of Care Initial Call    Was this an external facility discharge? no    Discharge Facility: [address] [place] [address] to be reviewed by the provider   Additional needs identified to be addressed with provider:    smooth    AMB CC Provider Discharge Needs: none            [facility] attempt to reach Pt regarding recent hospital discharge. CTC left voice recording with call back number requesting a call back. Follow up appointments:    Future Appointments   Date Time Provider Andrew Lugo   2023 10:00 AM Pio Carballo MD [facility] [address]  GILBERTO PachecoN, RN  Care Transition Coordinator  Contact Number:  [phone]

## 2023-06-25 NOTE — OP NOTE
Operative Note      Patient: Isabella Schrader  YOB: 1950  MRN: [id number]    Date of Procedure: 6/14/2023    Pre-Op Diagnosis Codes:     * Prostate cancer ([address]) [facility]    Post-Op Diagnosis: Same       Procedure(s):  PORT PLACEMENT, RIGHT INTERNAL JUGULAR    Surgeon(s):  Edmar Hummel MD    Assistant:   Surgical Assistant: Len Marx  Resident: Finn Boykin DO    Anesthesia: Monitor Anesthesia Care    Estimated Blood Loss (mL): Minimal    Complications: None    Specimens:   * No specimens in log *    Implants:  Implant Name Type Inv. Item Serial No.  Lot No. LRB No. Used Action   PORT INFUS SGL LUMN ATTCH POLYUR OPN END CATH 8FR POWERPRT - [id number]  PORT INFUS SGL LUMN ATTCH POLYUR OPN END CATH 8FR POWERPRT  HAUL-[id number] Right 1 Implanted     Op findings: right internal jugular vein port-a-cath under US and with fluoroscopy with adequate placement. Will get a postop CXR to confirm no PTX. Procedure Details   After informed consent was obtained, the patient was taken to the operating room and placed in the supine position. Conscious sedation was achieved. The patient was then prepped and draped in standard sterile fashion. Initial attempts at accessing right subclavian vein were not successful. Attention was then turned to the anterior chest wall. Using an 18-gauge needle, the right IJ vein was accessed on the 1st attempt. US image printed and placed in pt permanent medical records. A guidewire was passed and position confirmed with fluoroscopy. An 11-blade scalpel was then used to make a 3 cm incision parallel to the clavicle at vein access site. Using Bovie electrocautery, a pocket was created for the port. The dilator introducer was then introduced without difficulty over guidewire. The dilator and guidewire was subsequently removed and the port-a-cath catheter advanced. Sheath was removed.  Catheter location was checked with

## 2023-06-29 ENCOUNTER — APPOINTMENT (OUTPATIENT)
Dept: GENERAL RADIOLOGY | Age: 73
End: 2023-06-29
Payer: MEDICARE

## 2023-06-29 ENCOUNTER — HOSPITAL ENCOUNTER (INPATIENT)
Age: 73
LOS: 8 days | Discharge: NURSING, REHAB OR LONG-TERM CARE | End: 2023-07-07
Attending: EMERGENCY MEDICINE | Admitting: HOSPITALIST
Payer: MEDICARE

## 2023-06-29 DIAGNOSIS — I48.92 ATRIAL FLUTTER, UNSPECIFIED TYPE (HCC): Primary | ICD-10-CM

## 2023-06-29 PROBLEM — E87.1 HYPONATREMIA: Status: ACTIVE | Noted: 2023-06-29

## 2023-06-29 PROBLEM — D61.818 PANCYTOPENIA (HCC): Status: ACTIVE | Noted: 2023-06-29

## 2023-06-29 PROBLEM — A41.9 SEPSIS WITH ACUTE RENAL FAILURE WITHOUT SEPTIC SHOCK (HCC): Status: ACTIVE | Noted: 2023-06-29

## 2023-06-29 PROBLEM — I47.1 SVT (SUPRAVENTRICULAR TACHYCARDIA) (HCC): Status: ACTIVE | Noted: 2023-06-29

## 2023-06-29 PROBLEM — I47.10 SVT (SUPRAVENTRICULAR TACHYCARDIA): Status: ACTIVE | Noted: 2023-01-01

## 2023-06-29 PROBLEM — R65.20 SEPSIS WITH ACUTE RENAL FAILURE WITHOUT SEPTIC SHOCK (HCC): Status: ACTIVE | Noted: 2023-06-29

## 2023-06-29 PROBLEM — N17.9 SEPSIS WITH ACUTE RENAL FAILURE WITHOUT SEPTIC SHOCK (HCC): Status: ACTIVE | Noted: 2023-06-29

## 2023-06-29 LAB
ALBUMIN SERPL-MCNC: 3.1 G/DL (ref 3.4–5)
ALBUMIN/GLOB SERPL: 0.9 {RATIO} (ref 1.1–2.2)
ALP SERPL-CCNC: 258 U/L (ref 40–129)
ALT SERPL-CCNC: 29 U/L (ref 10–40)
AMORPH SED URNS QL MICRO: NORMAL /HPF
ANION GAP SERPL CALCULATED.3IONS-SCNC: 18 MMOL/L (ref 3–16)
AST SERPL-CCNC: 19 U/L (ref 15–37)
BASE EXCESS BLDV CALC-SCNC: -3.3 MMOL/L (ref -2–3)
BILIRUB SERPL-MCNC: 0.9 MG/DL (ref 0–1)
BILIRUB UR QL STRIP.AUTO: NEGATIVE
BUN SERPL-MCNC: 30 MG/DL (ref 7–20)
CALCIUM SERPL-MCNC: 6.8 MG/DL (ref 8.3–10.6)
CHLORIDE SERPL-SCNC: 93 MMOL/L (ref 99–110)
CLARITY UR: CLEAR
CO2 BLDV-SCNC: 21 MMOL/L
CO2 SERPL-SCNC: 16 MMOL/L (ref 21–32)
COHGB MFR BLDV: 1.3 % (ref 0–1.5)
COLOR UR: YELLOW
CREAT SERPL-MCNC: 1.4 MG/DL (ref 0.8–1.3)
D DIMER: 4.75 UG/ML FEU (ref 0–0.6)
DEPRECATED RDW RBC AUTO: 15.6 % (ref 12.4–15.4)
DO-HGB MFR BLDV: 37.3 %
EKG DIAGNOSIS: NORMAL
EKG Q-T INTERVAL: 230 MS
EKG QRS DURATION: 88 MS
EKG QTC CALCULATION (BAZETT): 410 MS
EKG R AXIS: -10 DEGREES
EKG T AXIS: 5 DEGREES
EKG VENTRICULAR RATE: 191 BPM
GFR SERPLBLD CREATININE-BSD FMLA CKD-EPI: 52 ML/MIN/{1.73_M2}
GLUCOSE SERPL-MCNC: 138 MG/DL (ref 70–99)
GLUCOSE UR STRIP.AUTO-MCNC: NEGATIVE MG/DL
HCO3 BLDV-SCNC: 20.5 MMOL/L (ref 24–28)
HCT VFR BLD AUTO: 24.9 % (ref 40.5–52.5)
HGB BLD-MCNC: 8.4 G/DL (ref 13.5–17.5)
HGB UR QL STRIP.AUTO: ABNORMAL
INR PPP: 1.63 (ref 0.84–1.16)
KETONES UR STRIP.AUTO-MCNC: ABNORMAL MG/DL
LACTATE BLDV-SCNC: 1.6 MMOL/L (ref 0.4–2)
LACTATE BLDV-SCNC: 1.8 MMOL/L (ref 0.4–2)
LACTATE BLDV-SCNC: 3.9 MMOL/L (ref 0.4–1.9)
LEUKOCYTE ESTERASE UR QL STRIP.AUTO: NEGATIVE
LIPASE SERPL-CCNC: 9 U/L (ref 13–60)
MAGNESIUM SERPL-MCNC: 1.9 MG/DL (ref 1.8–2.4)
MCH RBC QN AUTO: 29.9 PG (ref 26–34)
MCHC RBC AUTO-ENTMCNC: 33.6 G/DL (ref 31–36)
MCV RBC AUTO: 88.8 FL (ref 80–100)
METHGB MFR BLDV: 0.2 % (ref 0–1.5)
NITRITE UR QL STRIP.AUTO: NEGATIVE
NT-PROBNP SERPL-MCNC: 2955 PG/ML (ref 0–449)
PCO2 BLDV: 31.1 MMHG (ref 41–51)
PH BLDV: 7.43 [PH] (ref 7.35–7.45)
PH UR STRIP.AUTO: 6 [PH] (ref 5–8)
PLATELET # BLD AUTO: 17 K/UL (ref 135–450)
PLATELET BLD QL SMEAR: ABNORMAL
PMV BLD AUTO: 9.3 FL (ref 5–10.5)
PO2 BLDV: 34.3 MMHG (ref 25–40)
POTASSIUM SERPL-SCNC: 4.9 MMOL/L (ref 3.5–5.1)
PROCALCITONIN SERPL IA-MCNC: 28.05 NG/ML (ref 0–0.15)
PROT SERPL-MCNC: 6.6 G/DL (ref 6.4–8.2)
PROT UR STRIP.AUTO-MCNC: 100 MG/DL
PROTHROMBIN TIME: 19.3 SEC (ref 11.5–14.8)
RBC # BLD AUTO: 2.8 M/UL (ref 4.2–5.9)
RBC #/AREA URNS HPF: NORMAL /HPF (ref 0–4)
SAO2 % BLDV: 62 %
SLIDE REVIEW: ABNORMAL
SODIUM SERPL-SCNC: 127 MMOL/L (ref 136–145)
SP GR UR STRIP.AUTO: 1.02 (ref 1–1.03)
TROPONIN, HIGH SENSITIVITY: 60 NG/L (ref 0–22)
TROPONIN, HIGH SENSITIVITY: 75 NG/L (ref 0–22)
UA COMPLETE W REFLEX CULTURE PNL UR: ABNORMAL
UA DIPSTICK W REFLEX MICRO PNL UR: YES
URN SPEC COLLECT METH UR: ABNORMAL
UROBILINOGEN UR STRIP-ACNC: 0.2 E.U./DL
WBC # BLD AUTO: 0.1 K/UL (ref 4–11)
WBC #/AREA URNS HPF: NORMAL /HPF (ref 0–5)

## 2023-06-29 PROCEDURE — 2580000003 HC RX 258

## 2023-06-29 PROCEDURE — 6360000002 HC RX W HCPCS

## 2023-06-29 PROCEDURE — 80053 COMPREHEN METABOLIC PANEL: CPT

## 2023-06-29 PROCEDURE — 96375 TX/PRO/DX INJ NEW DRUG ADDON: CPT

## 2023-06-29 PROCEDURE — 84145 PROCALCITONIN (PCT): CPT

## 2023-06-29 PROCEDURE — 83735 ASSAY OF MAGNESIUM: CPT

## 2023-06-29 PROCEDURE — 99285 EMERGENCY DEPT VISIT HI MDM: CPT

## 2023-06-29 PROCEDURE — 6360000002 HC RX W HCPCS: Performed by: EMERGENCY MEDICINE

## 2023-06-29 PROCEDURE — 2580000003 HC RX 258: Performed by: STUDENT IN AN ORGANIZED HEALTH CARE EDUCATION/TRAINING PROGRAM

## 2023-06-29 PROCEDURE — 85025 COMPLETE CBC W/AUTO DIFF WBC: CPT

## 2023-06-29 PROCEDURE — 85610 PROTHROMBIN TIME: CPT

## 2023-06-29 PROCEDURE — 99223 1ST HOSP IP/OBS HIGH 75: CPT | Performed by: HOSPITALIST

## 2023-06-29 PROCEDURE — 83880 ASSAY OF NATRIURETIC PEPTIDE: CPT

## 2023-06-29 PROCEDURE — 93005 ELECTROCARDIOGRAM TRACING: CPT | Performed by: EMERGENCY MEDICINE

## 2023-06-29 PROCEDURE — 71045 X-RAY EXAM CHEST 1 VIEW: CPT

## 2023-06-29 PROCEDURE — 6370000000 HC RX 637 (ALT 250 FOR IP)

## 2023-06-29 PROCEDURE — 36415 COLL VENOUS BLD VENIPUNCTURE: CPT

## 2023-06-29 PROCEDURE — 96361 HYDRATE IV INFUSION ADD-ON: CPT

## 2023-06-29 PROCEDURE — 85379 FIBRIN DEGRADATION QUANT: CPT

## 2023-06-29 PROCEDURE — 96365 THER/PROPH/DIAG IV INF INIT: CPT

## 2023-06-29 PROCEDURE — 2580000003 HC RX 258: Performed by: EMERGENCY MEDICINE

## 2023-06-29 PROCEDURE — 83605 ASSAY OF LACTIC ACID: CPT

## 2023-06-29 PROCEDURE — 87040 BLOOD CULTURE FOR BACTERIA: CPT

## 2023-06-29 PROCEDURE — 83690 ASSAY OF LIPASE: CPT

## 2023-06-29 PROCEDURE — 84484 ASSAY OF TROPONIN QUANT: CPT

## 2023-06-29 PROCEDURE — 81001 URINALYSIS AUTO W/SCOPE: CPT

## 2023-06-29 PROCEDURE — 2000000000 HC ICU R&B

## 2023-06-29 PROCEDURE — 82803 BLOOD GASES ANY COMBINATION: CPT

## 2023-06-29 PROCEDURE — 6360000002 HC RX W HCPCS: Performed by: STUDENT IN AN ORGANIZED HEALTH CARE EDUCATION/TRAINING PROGRAM

## 2023-06-29 RX ORDER — MAGNESIUM SULFATE IN WATER 40 MG/ML
2000 INJECTION, SOLUTION INTRAVENOUS ONCE
Status: COMPLETED | OUTPATIENT
Start: 2023-06-29 | End: 2023-06-29

## 2023-06-29 RX ORDER — ONDANSETRON 4 MG/1
4 TABLET, ORALLY DISINTEGRATING ORAL EVERY 8 HOURS PRN
Status: DISCONTINUED | OUTPATIENT
Start: 2023-06-29 | End: 2023-07-07 | Stop reason: HOSPADM

## 2023-06-29 RX ORDER — MORPHINE SULFATE 10 MG/1
10 CAPSULE, EXTENDED RELEASE ORAL DAILY PRN
Status: DISCONTINUED | OUTPATIENT
Start: 2023-06-29 | End: 2023-06-29

## 2023-06-29 RX ORDER — 0.9 % SODIUM CHLORIDE 0.9 %
1000 INTRAVENOUS SOLUTION INTRAVENOUS ONCE
Status: COMPLETED | OUTPATIENT
Start: 2023-06-29 | End: 2023-06-29

## 2023-06-29 RX ORDER — SODIUM CHLORIDE 9 MG/ML
30 INJECTION, SOLUTION INTRAVENOUS ONCE
Status: COMPLETED | OUTPATIENT
Start: 2023-06-29 | End: 2023-06-29

## 2023-06-29 RX ORDER — SODIUM CHLORIDE 0.9 % (FLUSH) 0.9 %
5-40 SYRINGE (ML) INJECTION EVERY 12 HOURS SCHEDULED
Status: DISCONTINUED | OUTPATIENT
Start: 2023-06-29 | End: 2023-07-07 | Stop reason: HOSPADM

## 2023-06-29 RX ORDER — SODIUM CHLORIDE 0.9 % (FLUSH) 0.9 %
5-40 SYRINGE (ML) INJECTION PRN
Status: DISCONTINUED | OUTPATIENT
Start: 2023-06-29 | End: 2023-07-07 | Stop reason: HOSPADM

## 2023-06-29 RX ORDER — SENNA PLUS 8.6 MG/1
2 TABLET ORAL DAILY
Status: DISCONTINUED | OUTPATIENT
Start: 2023-06-29 | End: 2023-07-07 | Stop reason: HOSPADM

## 2023-06-29 RX ORDER — SODIUM CHLORIDE 9 MG/ML
INJECTION, SOLUTION INTRAVENOUS PRN
Status: DISCONTINUED | OUTPATIENT
Start: 2023-06-29 | End: 2023-07-07 | Stop reason: HOSPADM

## 2023-06-29 RX ORDER — ACETAMINOPHEN 325 MG/1
650 TABLET ORAL EVERY 6 HOURS PRN
Status: DISCONTINUED | OUTPATIENT
Start: 2023-06-29 | End: 2023-07-07 | Stop reason: HOSPADM

## 2023-06-29 RX ORDER — MORPHINE SULFATE 10 MG/1
10 CAPSULE, EXTENDED RELEASE ORAL DAILY PRN
Status: DISCONTINUED | OUTPATIENT
Start: 2023-06-29 | End: 2023-06-30 | Stop reason: SDUPTHER

## 2023-06-29 RX ORDER — ONDANSETRON 2 MG/ML
4 INJECTION INTRAMUSCULAR; INTRAVENOUS EVERY 6 HOURS PRN
Status: DISCONTINUED | OUTPATIENT
Start: 2023-06-29 | End: 2023-07-07 | Stop reason: HOSPADM

## 2023-06-29 RX ORDER — DIGOXIN 0.25 MG/ML
250 INJECTION INTRAMUSCULAR; INTRAVENOUS ONCE
Status: COMPLETED | OUTPATIENT
Start: 2023-06-29 | End: 2023-06-29

## 2023-06-29 RX ORDER — DEXAMETHASONE 4 MG/1
4 TABLET ORAL 2 TIMES DAILY WITH MEALS
Status: DISCONTINUED | OUTPATIENT
Start: 2023-06-29 | End: 2023-07-06

## 2023-06-29 RX ORDER — HYDROCODONE BITARTRATE AND ACETAMINOPHEN 5; 325 MG/1; MG/1
2 TABLET ORAL EVERY 4 HOURS PRN
Status: DISCONTINUED | OUTPATIENT
Start: 2023-06-29 | End: 2023-07-07 | Stop reason: HOSPADM

## 2023-06-29 RX ORDER — ACETAMINOPHEN 650 MG/1
650 SUPPOSITORY RECTAL EVERY 6 HOURS PRN
Status: DISCONTINUED | OUTPATIENT
Start: 2023-06-29 | End: 2023-07-07 | Stop reason: HOSPADM

## 2023-06-29 RX ORDER — AMIODARONE HYDROCHLORIDE 50 MG/ML
150 INJECTION, SOLUTION INTRAVENOUS ONCE
Status: COMPLETED | OUTPATIENT
Start: 2023-06-29 | End: 2023-06-29

## 2023-06-29 RX ORDER — METOPROLOL TARTRATE 5 MG/5ML
2.5 INJECTION INTRAVENOUS ONCE
Status: DISCONTINUED | OUTPATIENT
Start: 2023-06-29 | End: 2023-07-01

## 2023-06-29 RX ORDER — POLYETHYLENE GLYCOL 3350 17 G/17G
17 POWDER, FOR SOLUTION ORAL DAILY PRN
Status: DISCONTINUED | OUTPATIENT
Start: 2023-06-29 | End: 2023-07-07 | Stop reason: HOSPADM

## 2023-06-29 RX ORDER — PANTOPRAZOLE SODIUM 40 MG/1
40 TABLET, DELAYED RELEASE ORAL
Status: DISCONTINUED | OUTPATIENT
Start: 2023-06-30 | End: 2023-07-07 | Stop reason: HOSPADM

## 2023-06-29 RX ORDER — SODIUM CHLORIDE, SODIUM LACTATE, POTASSIUM CHLORIDE, AND CALCIUM CHLORIDE .6; .31; .03; .02 G/100ML; G/100ML; G/100ML; G/100ML
500 INJECTION, SOLUTION INTRAVENOUS ONCE
Status: COMPLETED | OUTPATIENT
Start: 2023-06-29 | End: 2023-06-29

## 2023-06-29 RX ADMIN — CEFEPIME 2000 MG: 2 INJECTION, POWDER, FOR SOLUTION INTRAVENOUS at 16:27

## 2023-06-29 RX ADMIN — MAGNESIUM SULFATE HEPTAHYDRATE 2000 MG: 40 INJECTION, SOLUTION INTRAVENOUS at 21:08

## 2023-06-29 RX ADMIN — FILGRASTIM-AAFI 480 MCG: 480 INJECTION, SOLUTION SUBCUTANEOUS at 23:29

## 2023-06-29 RX ADMIN — VANCOMYCIN HYDROCHLORIDE 1500 MG: 10 INJECTION, POWDER, LYOPHILIZED, FOR SOLUTION INTRAVENOUS at 17:00

## 2023-06-29 RX ADMIN — AMIODARONE HYDROCHLORIDE 1 MG/MIN: 50 INJECTION, SOLUTION INTRAVENOUS at 19:51

## 2023-06-29 RX ADMIN — DEXAMETHASONE 4 MG: 4 TABLET ORAL at 21:09

## 2023-06-29 RX ADMIN — SENNOSIDES 17.2 MG: 8.6 TABLET, FILM COATED ORAL at 21:09

## 2023-06-29 RX ADMIN — AMIODARONE HYDROCHLORIDE 150 MG: 50 INJECTION, SOLUTION INTRAVENOUS at 17:47

## 2023-06-29 RX ADMIN — SODIUM CHLORIDE, PRESERVATIVE FREE 10 ML: 5 INJECTION INTRAVENOUS at 22:06

## 2023-06-29 RX ADMIN — SODIUM CHLORIDE, POTASSIUM CHLORIDE, SODIUM LACTATE AND CALCIUM CHLORIDE 500 ML: 600; 310; 30; 20 INJECTION, SOLUTION INTRAVENOUS at 21:55

## 2023-06-29 RX ADMIN — SODIUM CHLORIDE 30 ML/KG/HR: 9 INJECTION, SOLUTION INTRAVENOUS at 16:14

## 2023-06-29 RX ADMIN — DIGOXIN 250 MCG: 0.25 INJECTION INTRAMUSCULAR; INTRAVENOUS at 22:09

## 2023-06-29 RX ADMIN — SODIUM CHLORIDE 1000 ML: 9 INJECTION, SOLUTION INTRAVENOUS at 17:46

## 2023-06-29 ASSESSMENT — ENCOUNTER SYMPTOMS
ABDOMINAL PAIN: 0
CHOKING: 0
SHORTNESS OF BREATH: 1
DIARRHEA: 0
RHINORRHEA: 0
NAUSEA: 1
VOMITING: 0
FACIAL SWELLING: 0
EYE PAIN: 0
EYE DISCHARGE: 0
CHEST TIGHTNESS: 0

## 2023-06-29 ASSESSMENT — PAIN - FUNCTIONAL ASSESSMENT: PAIN_FUNCTIONAL_ASSESSMENT: ACTIVITIES ARE NOT PREVENTED

## 2023-06-29 ASSESSMENT — PAIN DESCRIPTION - ORIENTATION: ORIENTATION: RIGHT

## 2023-06-29 ASSESSMENT — PAIN SCALES - GENERAL
PAINLEVEL_OUTOF10: 3
PAINLEVEL_OUTOF10: 4

## 2023-06-29 ASSESSMENT — PAIN DESCRIPTION - FREQUENCY: FREQUENCY: INTERMITTENT

## 2023-06-29 ASSESSMENT — PAIN DESCRIPTION - PAIN TYPE: TYPE: ACUTE PAIN

## 2023-06-29 ASSESSMENT — PAIN DESCRIPTION - DESCRIPTORS: DESCRIPTORS: SHARP;TENDER

## 2023-06-29 ASSESSMENT — PAIN DESCRIPTION - LOCATION: LOCATION: WRIST

## 2023-06-29 ASSESSMENT — PAIN DESCRIPTION - ONSET: ONSET: ON-GOING

## 2023-06-30 LAB
ABO + RH BLD: NORMAL
ALBUMIN SERPL-MCNC: 2.5 G/DL (ref 3.4–5)
ANION GAP SERPL CALCULATED.3IONS-SCNC: 13 MMOL/L (ref 3–16)
ANISOCYTOSIS BLD QL SMEAR: ABNORMAL
BLD GP AB SCN SERPL QL: NORMAL
BLOOD BANK DISPENSE STATUS: NORMAL
BLOOD BANK PRODUCT CODE: NORMAL
BPU ID: NORMAL
BUN SERPL-MCNC: 22 MG/DL (ref 7–20)
CALCIUM SERPL-MCNC: 5.8 MG/DL (ref 8.3–10.6)
CHLORIDE SERPL-SCNC: 101 MMOL/L (ref 99–110)
CO2 SERPL-SCNC: 19 MMOL/L (ref 21–32)
CREAT SERPL-MCNC: 0.9 MG/DL (ref 0.8–1.3)
DEPRECATED RDW RBC AUTO: 15.6 % (ref 12.4–15.4)
DEPRECATED RDW RBC AUTO: 16 % (ref 12.4–15.4)
DESCRIPTION BLOOD BANK: NORMAL
FERRITIN SERPL IA-MCNC: 5742 NG/ML (ref 30–400)
GFR SERPLBLD CREATININE-BSD FMLA CKD-EPI: >60 ML/MIN/{1.73_M2}
GLUCOSE SERPL-MCNC: 140 MG/DL (ref 70–99)
HCT VFR BLD AUTO: 19.1 % (ref 40.5–52.5)
HCT VFR BLD AUTO: 21.8 % (ref 40.5–52.5)
HCT VFR BLD AUTO: 23 % (ref 40.5–52.5)
HGB BLD-MCNC: 6.5 G/DL (ref 13.5–17.5)
HGB BLD-MCNC: 7.8 G/DL (ref 13.5–17.5)
IMMATURE RETIC FRACT: 0.65 (ref 0.21–0.37)
LDH SERPL L TO P-CCNC: 255 U/L (ref 100–190)
MAGNESIUM SERPL-MCNC: 2.2 MG/DL (ref 1.8–2.4)
MCH RBC QN AUTO: 30.3 PG (ref 26–34)
MCH RBC QN AUTO: 30.3 PG (ref 26–34)
MCHC RBC AUTO-ENTMCNC: 33.7 G/DL (ref 31–36)
MCHC RBC AUTO-ENTMCNC: 33.9 G/DL (ref 31–36)
MCV RBC AUTO: 89.3 FL (ref 80–100)
MCV RBC AUTO: 89.8 FL (ref 80–100)
PHOSPHATE SERPL-MCNC: 1.5 MG/DL (ref 2.5–4.9)
PLATELET # BLD AUTO: 13 K/UL (ref 135–450)
PLATELET # BLD AUTO: 15 K/UL (ref 135–450)
PLATELET BLD QL SMEAR: ABNORMAL
PMV BLD AUTO: 8.8 FL (ref 5–10.5)
PMV BLD AUTO: 8.9 FL (ref 5–10.5)
POLYCHROMASIA BLD QL SMEAR: ABNORMAL
POTASSIUM SERPL-SCNC: 3.7 MMOL/L (ref 3.5–5.1)
RBC # BLD AUTO: 2.14 M/UL (ref 4.2–5.9)
RBC # BLD AUTO: 2.56 M/UL (ref 4.2–5.9)
RETICS # AUTO: 0.02 M/UL
RETICS/RBC NFR AUTO: 0.92 % (ref 0.5–2.18)
SODIUM SERPL-SCNC: 133 MMOL/L (ref 136–145)
WBC # BLD AUTO: 0.1 K/UL (ref 4–11)
WBC # BLD AUTO: 0.1 K/UL (ref 4–11)

## 2023-06-30 PROCEDURE — 82607 VITAMIN B-12: CPT

## 2023-06-30 PROCEDURE — 2580000003 HC RX 258: Performed by: STUDENT IN AN ORGANIZED HEALTH CARE EDUCATION/TRAINING PROGRAM

## 2023-06-30 PROCEDURE — 83735 ASSAY OF MAGNESIUM: CPT

## 2023-06-30 PROCEDURE — 6360000002 HC RX W HCPCS

## 2023-06-30 PROCEDURE — 6360000002 HC RX W HCPCS: Performed by: STUDENT IN AN ORGANIZED HEALTH CARE EDUCATION/TRAINING PROGRAM

## 2023-06-30 PROCEDURE — 82525 ASSAY OF COPPER: CPT

## 2023-06-30 PROCEDURE — 86900 BLOOD TYPING SEROLOGIC ABO: CPT

## 2023-06-30 PROCEDURE — 36430 TRANSFUSION BLD/BLD COMPNT: CPT

## 2023-06-30 PROCEDURE — 86850 RBC ANTIBODY SCREEN: CPT

## 2023-06-30 PROCEDURE — 83540 ASSAY OF IRON: CPT

## 2023-06-30 PROCEDURE — 82728 ASSAY OF FERRITIN: CPT

## 2023-06-30 PROCEDURE — 82668 ASSAY OF ERYTHROPOIETIN: CPT

## 2023-06-30 PROCEDURE — 80069 RENAL FUNCTION PANEL: CPT

## 2023-06-30 PROCEDURE — 86923 COMPATIBILITY TEST ELECTRIC: CPT

## 2023-06-30 PROCEDURE — 85025 COMPLETE CBC W/AUTO DIFF WBC: CPT

## 2023-06-30 PROCEDURE — 99223 1ST HOSP IP/OBS HIGH 75: CPT | Performed by: INTERNAL MEDICINE

## 2023-06-30 PROCEDURE — 85045 AUTOMATED RETICULOCYTE COUNT: CPT

## 2023-06-30 PROCEDURE — 83615 LACTATE (LD) (LDH) ENZYME: CPT

## 2023-06-30 PROCEDURE — 2000000000 HC ICU R&B

## 2023-06-30 PROCEDURE — 82746 ASSAY OF FOLIC ACID SERUM: CPT

## 2023-06-30 PROCEDURE — 6370000000 HC RX 637 (ALT 250 FOR IP): Performed by: STUDENT IN AN ORGANIZED HEALTH CARE EDUCATION/TRAINING PROGRAM

## 2023-06-30 PROCEDURE — 83010 ASSAY OF HAPTOGLOBIN QUANT: CPT

## 2023-06-30 PROCEDURE — 30233N1 TRANSFUSION OF NONAUTOLOGOUS RED BLOOD CELLS INTO PERIPHERAL VEIN, PERCUTANEOUS APPROACH: ICD-10-PCS | Performed by: HOSPITALIST

## 2023-06-30 PROCEDURE — P9016 RBC LEUKOCYTES REDUCED: HCPCS

## 2023-06-30 PROCEDURE — 86901 BLOOD TYPING SEROLOGIC RH(D): CPT

## 2023-06-30 PROCEDURE — 6370000000 HC RX 637 (ALT 250 FOR IP)

## 2023-06-30 PROCEDURE — 2500000003 HC RX 250 WO HCPCS: Performed by: STUDENT IN AN ORGANIZED HEALTH CARE EDUCATION/TRAINING PROGRAM

## 2023-06-30 PROCEDURE — 83550 IRON BINDING TEST: CPT

## 2023-06-30 PROCEDURE — 36415 COLL VENOUS BLD VENIPUNCTURE: CPT

## 2023-06-30 RX ORDER — PROCHLORPERAZINE MALEATE 10 MG
10 TABLET ORAL EVERY 6 HOURS PRN
COMMUNITY

## 2023-06-30 RX ORDER — SODIUM CHLORIDE 9 MG/ML
INJECTION, SOLUTION INTRAVENOUS PRN
Status: DISCONTINUED | OUTPATIENT
Start: 2023-06-30 | End: 2023-07-07 | Stop reason: HOSPADM

## 2023-06-30 RX ORDER — ONDANSETRON HYDROCHLORIDE 8 MG/1
8 TABLET, FILM COATED ORAL EVERY 8 HOURS PRN
COMMUNITY

## 2023-06-30 RX ORDER — MORPHINE SULFATE 15 MG/1
15 TABLET, FILM COATED, EXTENDED RELEASE ORAL 2 TIMES DAILY
Status: DISCONTINUED | OUTPATIENT
Start: 2023-06-30 | End: 2023-07-07 | Stop reason: HOSPADM

## 2023-06-30 RX ORDER — OMEPRAZOLE 20 MG/1
20 CAPSULE, DELAYED RELEASE ORAL DAILY
COMMUNITY

## 2023-06-30 RX ORDER — MORPHINE SULFATE 15 MG/1
15 TABLET, FILM COATED, EXTENDED RELEASE ORAL 2 TIMES DAILY
COMMUNITY

## 2023-06-30 RX ORDER — SENNA PLUS 8.6 MG/1
1 TABLET ORAL NIGHTLY PRN
Status: ON HOLD | COMMUNITY
End: 2023-07-07 | Stop reason: HOSPADM

## 2023-06-30 RX ORDER — SODIUM CHLORIDE 9 MG/ML
INJECTION, SOLUTION INTRAVENOUS PRN
Status: DISCONTINUED | OUTPATIENT
Start: 2023-06-30 | End: 2023-06-30

## 2023-06-30 RX ORDER — CALCIUM GLUCONATE 10 MG/ML
1000 INJECTION, SOLUTION INTRAVENOUS ONCE
Status: COMPLETED | OUTPATIENT
Start: 2023-06-30 | End: 2023-06-30

## 2023-06-30 RX ORDER — SENNA PLUS 8.6 MG/1
1 TABLET ORAL DAILY
COMMUNITY

## 2023-06-30 RX ORDER — LATANOPROST 50 UG/ML
1 SOLUTION/ DROPS OPHTHALMIC NIGHTLY
Status: DISCONTINUED | OUTPATIENT
Start: 2023-06-30 | End: 2023-07-07 | Stop reason: HOSPADM

## 2023-06-30 RX ADMIN — CEFEPIME 2000 MG: 2 INJECTION, POWDER, FOR SOLUTION INTRAVENOUS at 04:22

## 2023-06-30 RX ADMIN — PANTOPRAZOLE SODIUM 40 MG: 40 TABLET, DELAYED RELEASE ORAL at 05:59

## 2023-06-30 RX ADMIN — AMIODARONE HYDROCHLORIDE 0.5 MG/MIN: 50 INJECTION, SOLUTION INTRAVENOUS at 20:41

## 2023-06-30 RX ADMIN — MORPHINE SULFATE 15 MG: 15 TABLET, FILM COATED, EXTENDED RELEASE ORAL at 11:44

## 2023-06-30 RX ADMIN — DEXAMETHASONE 4 MG: 4 TABLET ORAL at 18:57

## 2023-06-30 RX ADMIN — POTASSIUM BICARBONATE 40 MEQ: 782 TABLET, EFFERVESCENT ORAL at 09:31

## 2023-06-30 RX ADMIN — VANCOMYCIN HYDROCHLORIDE 1250 MG: 10 INJECTION, POWDER, LYOPHILIZED, FOR SOLUTION INTRAVENOUS at 11:26

## 2023-06-30 RX ADMIN — AMIODARONE HYDROCHLORIDE 0.5 MG/MIN: 50 INJECTION, SOLUTION INTRAVENOUS at 01:02

## 2023-06-30 RX ADMIN — FILGRASTIM-AAFI 480 MCG: 480 INJECTION, SOLUTION SUBCUTANEOUS at 09:40

## 2023-06-30 RX ADMIN — DEXAMETHASONE 4 MG: 4 TABLET ORAL at 09:31

## 2023-06-30 RX ADMIN — MORPHINE SULFATE 15 MG: 15 TABLET, FILM COATED, EXTENDED RELEASE ORAL at 20:40

## 2023-06-30 RX ADMIN — LATANOPROST 1 DROP: 50 SOLUTION OPHTHALMIC at 20:40

## 2023-06-30 RX ADMIN — SODIUM CHLORIDE, PRESERVATIVE FREE 10 ML: 5 INJECTION INTRAVENOUS at 20:43

## 2023-06-30 RX ADMIN — CEFEPIME 2000 MG: 2 INJECTION, POWDER, FOR SOLUTION INTRAVENOUS at 18:59

## 2023-06-30 RX ADMIN — ACETAMINOPHEN 650 MG: 325 TABLET ORAL at 06:10

## 2023-06-30 RX ADMIN — CALCIUM GLUCONATE 1000 MG: 10 INJECTION, SOLUTION INTRAVENOUS at 09:33

## 2023-06-30 RX ADMIN — SENNOSIDES 17.2 MG: 8.6 TABLET, FILM COATED ORAL at 09:31

## 2023-06-30 RX ADMIN — SODIUM PHOSPHATE, MONOBASIC, MONOHYDRATE AND SODIUM PHOSPHATE, DIBASIC, ANHYDROUS 15 MMOL: 142; 276 INJECTION, SOLUTION INTRAVENOUS at 13:10

## 2023-06-30 RX ADMIN — AMIODARONE HYDROCHLORIDE 0.5 MG/MIN: 50 INJECTION, SOLUTION INTRAVENOUS at 06:08

## 2023-06-30 ASSESSMENT — ENCOUNTER SYMPTOMS
SHORTNESS OF BREATH: 0
VOMITING: 0
ABDOMINAL PAIN: 0
NAUSEA: 1
BACK PAIN: 0

## 2023-06-30 ASSESSMENT — PAIN SCALES - GENERAL
PAINLEVEL_OUTOF10: 3
PAINLEVEL_OUTOF10: 5
PAINLEVEL_OUTOF10: 4
PAINLEVEL_OUTOF10: 2
PAINLEVEL_OUTOF10: 0
PAINLEVEL_OUTOF10: 3
PAINLEVEL_OUTOF10: 0
PAINLEVEL_OUTOF10: 3

## 2023-06-30 ASSESSMENT — PAIN DESCRIPTION - DESCRIPTORS: DESCRIPTORS: ACHING

## 2023-06-30 ASSESSMENT — PAIN DESCRIPTION - LOCATION: LOCATION: WRIST

## 2023-06-30 ASSESSMENT — PAIN DESCRIPTION - ORIENTATION: ORIENTATION: RIGHT;DISTAL

## 2023-07-01 ENCOUNTER — APPOINTMENT (OUTPATIENT)
Dept: GENERAL RADIOLOGY | Age: 73
End: 2023-07-01
Payer: MEDICARE

## 2023-07-01 PROBLEM — E86.1 HYPOTENSION DUE TO HYPOVOLEMIA: Status: ACTIVE | Noted: 2023-07-01

## 2023-07-01 PROBLEM — T45.1X5A CHEMOTHERAPY-INDUCED NEUTROPENIA (HCC): Status: ACTIVE | Noted: 2023-07-01

## 2023-07-01 PROBLEM — C7A.8: Status: ACTIVE | Noted: 2023-07-01

## 2023-07-01 PROBLEM — D70.1 CHEMOTHERAPY-INDUCED NEUTROPENIA (HCC): Status: ACTIVE | Noted: 2023-07-01

## 2023-07-01 PROBLEM — I95.89 HYPOTENSION DUE TO HYPOVOLEMIA: Status: ACTIVE | Noted: 2023-07-01

## 2023-07-01 LAB
ALBUMIN SERPL-MCNC: 2.3 G/DL (ref 3.4–5)
ANION GAP SERPL CALCULATED.3IONS-SCNC: 13 MMOL/L (ref 3–16)
BUN SERPL-MCNC: 20 MG/DL (ref 7–20)
CALCIUM SERPL-MCNC: 5.9 MG/DL (ref 8.3–10.6)
CHLORIDE SERPL-SCNC: 99 MMOL/L (ref 99–110)
CO2 SERPL-SCNC: 18 MMOL/L (ref 21–32)
CREAT SERPL-MCNC: 0.8 MG/DL (ref 0.8–1.3)
CRP SERPL-MCNC: 350.7 MG/L (ref 0–5.1)
DEPRECATED RDW RBC AUTO: 15.7 % (ref 12.4–15.4)
ERYTHROCYTE [SEDIMENTATION RATE] IN BLOOD BY WESTERGREN METHOD: 103 MM/HR (ref 0–20)
FOLATE SERPL-MCNC: 11.7 NG/ML (ref 4.78–24.2)
GFR SERPLBLD CREATININE-BSD FMLA CKD-EPI: >60 ML/MIN/{1.73_M2}
GLUCOSE SERPL-MCNC: 154 MG/DL (ref 70–99)
HAPTOGLOB SERPL-MCNC: 336 MG/DL (ref 30–200)
HCT VFR BLD AUTO: 22.8 % (ref 40.5–52.5)
HGB BLD-MCNC: 7.9 G/DL (ref 13.5–17.5)
IRON SATN MFR SERPL: 53 % (ref 20–50)
IRON SERPL-MCNC: 67 UG/DL (ref 59–158)
LV EF: 50 %
LVEF MODALITY: NORMAL
MAGNESIUM SERPL-MCNC: 1.7 MG/DL (ref 1.8–2.4)
MCH RBC QN AUTO: 30.4 PG (ref 26–34)
MCHC RBC AUTO-ENTMCNC: 34.6 G/DL (ref 31–36)
MCV RBC AUTO: 87.8 FL (ref 80–100)
OSMOLALITY SERPL: 284 MOSM/KG (ref 278–305)
PHOSPHATE SERPL-MCNC: 1.2 MG/DL (ref 2.5–4.9)
PLATELET # BLD AUTO: 13 K/UL (ref 135–450)
PMV BLD AUTO: 8.9 FL (ref 5–10.5)
POTASSIUM SERPL-SCNC: 4.1 MMOL/L (ref 3.5–5.1)
RBC # BLD AUTO: 2.59 M/UL (ref 4.2–5.9)
SODIUM SERPL-SCNC: 130 MMOL/L (ref 136–145)
TIBC SERPL-MCNC: 127 UG/DL (ref 260–445)
VIT B12 SERPL-MCNC: 773 PG/ML (ref 211–911)
WBC # BLD AUTO: 0.2 K/UL (ref 4–11)

## 2023-07-01 PROCEDURE — 6360000002 HC RX W HCPCS

## 2023-07-01 PROCEDURE — 86140 C-REACTIVE PROTEIN: CPT

## 2023-07-01 PROCEDURE — 2000000000 HC ICU R&B

## 2023-07-01 PROCEDURE — 93005 ELECTROCARDIOGRAM TRACING: CPT

## 2023-07-01 PROCEDURE — 6360000002 HC RX W HCPCS: Performed by: STUDENT IN AN ORGANIZED HEALTH CARE EDUCATION/TRAINING PROGRAM

## 2023-07-01 PROCEDURE — 83735 ASSAY OF MAGNESIUM: CPT

## 2023-07-01 PROCEDURE — 2580000003 HC RX 258: Performed by: STUDENT IN AN ORGANIZED HEALTH CARE EDUCATION/TRAINING PROGRAM

## 2023-07-01 PROCEDURE — 83930 ASSAY OF BLOOD OSMOLALITY: CPT

## 2023-07-01 PROCEDURE — 6370000000 HC RX 637 (ALT 250 FOR IP)

## 2023-07-01 PROCEDURE — 36415 COLL VENOUS BLD VENIPUNCTURE: CPT

## 2023-07-01 PROCEDURE — 73110 X-RAY EXAM OF WRIST: CPT

## 2023-07-01 PROCEDURE — 80069 RENAL FUNCTION PANEL: CPT

## 2023-07-01 PROCEDURE — 99233 SBSQ HOSP IP/OBS HIGH 50: CPT | Performed by: NURSE PRACTITIONER

## 2023-07-01 PROCEDURE — 99233 SBSQ HOSP IP/OBS HIGH 50: CPT | Performed by: INTERNAL MEDICINE

## 2023-07-01 PROCEDURE — 94761 N-INVAS EAR/PLS OXIMETRY MLT: CPT

## 2023-07-01 PROCEDURE — 85652 RBC SED RATE AUTOMATED: CPT

## 2023-07-01 PROCEDURE — 93306 TTE W/DOPPLER COMPLETE: CPT

## 2023-07-01 PROCEDURE — 85025 COMPLETE CBC W/AUTO DIFF WBC: CPT

## 2023-07-01 RX ORDER — CALCIUM GLUCONATE 10 MG/ML
1000 INJECTION, SOLUTION INTRAVENOUS ONCE
Status: COMPLETED | OUTPATIENT
Start: 2023-07-01 | End: 2023-07-01

## 2023-07-01 RX ORDER — METOPROLOL SUCCINATE 25 MG/1
25 TABLET, EXTENDED RELEASE ORAL ONCE
Status: COMPLETED | OUTPATIENT
Start: 2023-07-01 | End: 2023-07-01

## 2023-07-01 RX ORDER — METOPROLOL SUCCINATE 25 MG/1
25 TABLET, EXTENDED RELEASE ORAL DAILY
Status: DISCONTINUED | OUTPATIENT
Start: 2023-07-01 | End: 2023-07-01

## 2023-07-01 RX ORDER — METOPROLOL SUCCINATE 50 MG/1
50 TABLET, EXTENDED RELEASE ORAL DAILY
Status: DISCONTINUED | OUTPATIENT
Start: 2023-07-02 | End: 2023-07-07 | Stop reason: HOSPADM

## 2023-07-01 RX ORDER — MAGNESIUM SULFATE IN WATER 40 MG/ML
4000 INJECTION, SOLUTION INTRAVENOUS ONCE
Status: COMPLETED | OUTPATIENT
Start: 2023-07-01 | End: 2023-07-01

## 2023-07-01 RX ADMIN — CALCIUM GLUCONATE 1000 MG: 10 INJECTION, SOLUTION INTRAVENOUS at 19:00

## 2023-07-01 RX ADMIN — FILGRASTIM-AAFI 480 MCG: 480 INJECTION, SOLUTION SUBCUTANEOUS at 09:34

## 2023-07-01 RX ADMIN — MAGNESIUM SULFATE HEPTAHYDRATE 4000 MG: 40 INJECTION, SOLUTION INTRAVENOUS at 09:45

## 2023-07-01 RX ADMIN — LATANOPROST 1 DROP: 50 SOLUTION OPHTHALMIC at 19:51

## 2023-07-01 RX ADMIN — METOPROLOL SUCCINATE 25 MG: 25 TABLET, EXTENDED RELEASE ORAL at 09:43

## 2023-07-01 RX ADMIN — CEFEPIME 2000 MG: 2 INJECTION, POWDER, FOR SOLUTION INTRAVENOUS at 15:02

## 2023-07-01 RX ADMIN — DEXAMETHASONE 4 MG: 4 TABLET ORAL at 09:34

## 2023-07-01 RX ADMIN — AMIODARONE HYDROCHLORIDE 0.5 MG/MIN: 50 INJECTION, SOLUTION INTRAVENOUS at 13:13

## 2023-07-01 RX ADMIN — VANCOMYCIN HYDROCHLORIDE 1250 MG: 10 INJECTION, POWDER, LYOPHILIZED, FOR SOLUTION INTRAVENOUS at 13:10

## 2023-07-01 RX ADMIN — SENNOSIDES 17.2 MG: 8.6 TABLET, FILM COATED ORAL at 09:34

## 2023-07-01 RX ADMIN — SODIUM CHLORIDE, PRESERVATIVE FREE 10 ML: 5 INJECTION INTRAVENOUS at 19:54

## 2023-07-01 RX ADMIN — MORPHINE SULFATE 15 MG: 15 TABLET, FILM COATED, EXTENDED RELEASE ORAL at 09:34

## 2023-07-01 RX ADMIN — DEXAMETHASONE 4 MG: 4 TABLET ORAL at 18:59

## 2023-07-01 RX ADMIN — CEFEPIME 2000 MG: 2 INJECTION, POWDER, FOR SOLUTION INTRAVENOUS at 21:07

## 2023-07-01 RX ADMIN — MORPHINE SULFATE 15 MG: 15 TABLET, FILM COATED, EXTENDED RELEASE ORAL at 20:00

## 2023-07-01 RX ADMIN — PANTOPRAZOLE SODIUM 40 MG: 40 TABLET, DELAYED RELEASE ORAL at 05:09

## 2023-07-01 RX ADMIN — METOPROLOL SUCCINATE 25 MG: 25 TABLET, EXTENDED RELEASE ORAL at 13:13

## 2023-07-01 RX ADMIN — CEFEPIME 2000 MG: 2 INJECTION, POWDER, FOR SOLUTION INTRAVENOUS at 05:09

## 2023-07-01 ASSESSMENT — PAIN SCALES - GENERAL
PAINLEVEL_OUTOF10: 0

## 2023-07-01 ASSESSMENT — PAIN - FUNCTIONAL ASSESSMENT: PAIN_FUNCTIONAL_ASSESSMENT: ACTIVITIES ARE NOT PREVENTED

## 2023-07-01 ASSESSMENT — ENCOUNTER SYMPTOMS
VOMITING: 0
SHORTNESS OF BREATH: 0
NAUSEA: 0
ABDOMINAL PAIN: 0

## 2023-07-02 LAB
ALBUMIN SERPL-MCNC: 2.2 G/DL (ref 3.4–5)
ALBUMIN SERPL-MCNC: 2.3 G/DL (ref 3.4–5)
ANION GAP SERPL CALCULATED.3IONS-SCNC: 13 MMOL/L (ref 3–16)
ANION GAP SERPL CALCULATED.3IONS-SCNC: 13 MMOL/L (ref 3–16)
ANISOCYTOSIS BLD QL SMEAR: ABNORMAL
BASOPHILS # BLD: 0 K/UL (ref 0–0.2)
BASOPHILS NFR BLD: 0 %
BUN SERPL-MCNC: 25 MG/DL (ref 7–20)
BUN SERPL-MCNC: 26 MG/DL (ref 7–20)
CA-I BLD-SCNC: 0.77 MMOL/L (ref 1.12–1.32)
CALCIUM SERPL-MCNC: 6.1 MG/DL (ref 8.3–10.6)
CALCIUM SERPL-MCNC: 6.1 MG/DL (ref 8.3–10.6)
CHLORIDE SERPL-SCNC: 95 MMOL/L (ref 99–110)
CHLORIDE SERPL-SCNC: 96 MMOL/L (ref 99–110)
CO2 SERPL-SCNC: 19 MMOL/L (ref 21–32)
CO2 SERPL-SCNC: 20 MMOL/L (ref 21–32)
CREAT SERPL-MCNC: 0.9 MG/DL (ref 0.8–1.3)
CREAT SERPL-MCNC: 0.9 MG/DL (ref 0.8–1.3)
DEPRECATED RDW RBC AUTO: 16.2 % (ref 12.4–15.4)
EKG ATRIAL RATE: 105 BPM
EKG DIAGNOSIS: NORMAL
EKG P AXIS: 78 DEGREES
EKG P-R INTERVAL: 120 MS
EKG Q-T INTERVAL: 334 MS
EKG QRS DURATION: 80 MS
EKG QTC CALCULATION (BAZETT): 441 MS
EKG R AXIS: 61 DEGREES
EKG T AXIS: 68 DEGREES
EKG VENTRICULAR RATE: 105 BPM
EOSINOPHIL # BLD: 0 K/UL (ref 0–0.6)
EOSINOPHIL NFR BLD: 0 %
EPO SERPL-ACNC: 318 MU/ML (ref 4–27)
GFR SERPLBLD CREATININE-BSD FMLA CKD-EPI: >60 ML/MIN/{1.73_M2}
GFR SERPLBLD CREATININE-BSD FMLA CKD-EPI: >60 ML/MIN/{1.73_M2}
GLUCOSE SERPL-MCNC: 154 MG/DL (ref 70–99)
GLUCOSE SERPL-MCNC: 156 MG/DL (ref 70–99)
HCT VFR BLD AUTO: 22.5 % (ref 40.5–52.5)
HGB BLD-MCNC: 7.8 G/DL (ref 13.5–17.5)
LYMPHOCYTES # BLD: 0.1 K/UL (ref 1–5.1)
LYMPHOCYTES NFR BLD: 14 %
MAGNESIUM SERPL-MCNC: 2.3 MG/DL (ref 1.8–2.4)
MCH RBC QN AUTO: 31 PG (ref 26–34)
MCHC RBC AUTO-ENTMCNC: 34.9 G/DL (ref 31–36)
MCV RBC AUTO: 88.9 FL (ref 80–100)
MONOCYTES # BLD: 0 K/UL (ref 0–1.3)
MONOCYTES NFR BLD: 2 %
NEUTROPHILS # BLD: 0.5 K/UL (ref 1.7–7.7)
NEUTROPHILS NFR BLD: 80 %
NEUTS BAND NFR BLD MANUAL: 4 % (ref 0–7)
NRBC BLD-RTO: 12 /100 WBC
PH VENOUS: 7.41 (ref 7.35–7.45)
PHOSPHATE SERPL-MCNC: 1.3 MG/DL (ref 2.5–4.9)
PHOSPHATE SERPL-MCNC: 1.3 MG/DL (ref 2.5–4.9)
PLATELET # BLD AUTO: 14 K/UL (ref 135–450)
PMV BLD AUTO: 8.5 FL (ref 5–10.5)
POLYCHROMASIA BLD QL SMEAR: ABNORMAL
POTASSIUM SERPL-SCNC: 4.3 MMOL/L (ref 3.5–5.1)
POTASSIUM SERPL-SCNC: 4.7 MMOL/L (ref 3.5–5.1)
RBC # BLD AUTO: 2.53 M/UL (ref 4.2–5.9)
RBC MORPH BLD: NORMAL
SODIUM SERPL-SCNC: 128 MMOL/L (ref 136–145)
SODIUM SERPL-SCNC: 128 MMOL/L (ref 136–145)
VANCOMYCIN SERPL-MCNC: 9.6 UG/ML
WBC # BLD AUTO: 0.6 K/UL (ref 4–11)

## 2023-07-02 PROCEDURE — 94761 N-INVAS EAR/PLS OXIMETRY MLT: CPT

## 2023-07-02 PROCEDURE — 6360000002 HC RX W HCPCS: Performed by: STUDENT IN AN ORGANIZED HEALTH CARE EDUCATION/TRAINING PROGRAM

## 2023-07-02 PROCEDURE — 6370000000 HC RX 637 (ALT 250 FOR IP)

## 2023-07-02 PROCEDURE — 2580000003 HC RX 258: Performed by: STUDENT IN AN ORGANIZED HEALTH CARE EDUCATION/TRAINING PROGRAM

## 2023-07-02 PROCEDURE — 80202 ASSAY OF VANCOMYCIN: CPT

## 2023-07-02 PROCEDURE — 82330 ASSAY OF CALCIUM: CPT

## 2023-07-02 PROCEDURE — 99233 SBSQ HOSP IP/OBS HIGH 50: CPT | Performed by: INTERNAL MEDICINE

## 2023-07-02 PROCEDURE — 99233 SBSQ HOSP IP/OBS HIGH 50: CPT | Performed by: NURSE PRACTITIONER

## 2023-07-02 PROCEDURE — 93010 ELECTROCARDIOGRAM REPORT: CPT | Performed by: INTERNAL MEDICINE

## 2023-07-02 PROCEDURE — 6360000002 HC RX W HCPCS: Performed by: INTERNAL MEDICINE

## 2023-07-02 PROCEDURE — 6370000000 HC RX 637 (ALT 250 FOR IP): Performed by: STUDENT IN AN ORGANIZED HEALTH CARE EDUCATION/TRAINING PROGRAM

## 2023-07-02 PROCEDURE — 2500000003 HC RX 250 WO HCPCS: Performed by: STUDENT IN AN ORGANIZED HEALTH CARE EDUCATION/TRAINING PROGRAM

## 2023-07-02 PROCEDURE — 85025 COMPLETE CBC W/AUTO DIFF WBC: CPT

## 2023-07-02 PROCEDURE — 6370000000 HC RX 637 (ALT 250 FOR IP): Performed by: INTERNAL MEDICINE

## 2023-07-02 PROCEDURE — 6360000002 HC RX W HCPCS

## 2023-07-02 PROCEDURE — 80069 RENAL FUNCTION PANEL: CPT

## 2023-07-02 PROCEDURE — 1200000000 HC SEMI PRIVATE

## 2023-07-02 PROCEDURE — 83735 ASSAY OF MAGNESIUM: CPT

## 2023-07-02 PROCEDURE — 2580000003 HC RX 258: Performed by: INTERNAL MEDICINE

## 2023-07-02 RX ORDER — MAGNESIUM SULFATE IN WATER 40 MG/ML
2000 INJECTION, SOLUTION INTRAVENOUS PRN
Status: DISCONTINUED | OUTPATIENT
Start: 2023-07-02 | End: 2023-07-07 | Stop reason: HOSPADM

## 2023-07-02 RX ORDER — AMIODARONE HYDROCHLORIDE 200 MG/1
200 TABLET ORAL DAILY
Status: DISCONTINUED | OUTPATIENT
Start: 2023-07-16 | End: 2023-07-07 | Stop reason: HOSPADM

## 2023-07-02 RX ORDER — POTASSIUM CHLORIDE 20 MEQ/1
40 TABLET, EXTENDED RELEASE ORAL PRN
Status: DISCONTINUED | OUTPATIENT
Start: 2023-07-02 | End: 2023-07-07 | Stop reason: HOSPADM

## 2023-07-02 RX ORDER — CALCIUM GLUCONATE 10 MG/ML
1000 INJECTION, SOLUTION INTRAVENOUS ONCE
Status: COMPLETED | OUTPATIENT
Start: 2023-07-02 | End: 2023-07-02

## 2023-07-02 RX ORDER — POTASSIUM CHLORIDE 7.45 MG/ML
10 INJECTION INTRAVENOUS PRN
Status: DISCONTINUED | OUTPATIENT
Start: 2023-07-02 | End: 2023-07-07 | Stop reason: HOSPADM

## 2023-07-02 RX ORDER — AMIODARONE HYDROCHLORIDE 200 MG/1
200 TABLET ORAL 2 TIMES DAILY
Status: DISCONTINUED | OUTPATIENT
Start: 2023-07-02 | End: 2023-07-07 | Stop reason: HOSPADM

## 2023-07-02 RX ADMIN — AMIODARONE HYDROCHLORIDE 200 MG: 200 TABLET ORAL at 09:03

## 2023-07-02 RX ADMIN — SODIUM PHOSPHATE, MONOBASIC, MONOHYDRATE AND SODIUM PHOSPHATE, DIBASIC, ANHYDROUS 20 MMOL: 142; 276 INJECTION, SOLUTION INTRAVENOUS at 08:30

## 2023-07-02 RX ADMIN — DEXAMETHASONE 4 MG: 4 TABLET ORAL at 08:39

## 2023-07-02 RX ADMIN — SODIUM CHLORIDE 1500 MG: 900 INJECTION, SOLUTION INTRAVENOUS at 10:47

## 2023-07-02 RX ADMIN — LATANOPROST 1 DROP: 50 SOLUTION OPHTHALMIC at 20:52

## 2023-07-02 RX ADMIN — METOPROLOL SUCCINATE 50 MG: 50 TABLET, EXTENDED RELEASE ORAL at 08:38

## 2023-07-02 RX ADMIN — DEXAMETHASONE 4 MG: 4 TABLET ORAL at 17:05

## 2023-07-02 RX ADMIN — MORPHINE SULFATE 15 MG: 15 TABLET, FILM COATED, EXTENDED RELEASE ORAL at 19:07

## 2023-07-02 RX ADMIN — AMIODARONE HYDROCHLORIDE 200 MG: 200 TABLET ORAL at 20:51

## 2023-07-02 RX ADMIN — CEFEPIME 2000 MG: 2 INJECTION, POWDER, FOR SOLUTION INTRAVENOUS at 20:51

## 2023-07-02 RX ADMIN — SODIUM CHLORIDE, PRESERVATIVE FREE 10 ML: 5 INJECTION INTRAVENOUS at 20:53

## 2023-07-02 RX ADMIN — PANTOPRAZOLE SODIUM 40 MG: 40 TABLET, DELAYED RELEASE ORAL at 06:14

## 2023-07-02 RX ADMIN — FILGRASTIM-AAFI 480 MCG: 480 INJECTION, SOLUTION SUBCUTANEOUS at 08:39

## 2023-07-02 RX ADMIN — MORPHINE SULFATE 15 MG: 15 TABLET, FILM COATED, EXTENDED RELEASE ORAL at 08:38

## 2023-07-02 RX ADMIN — CEFEPIME 2000 MG: 2 INJECTION, POWDER, FOR SOLUTION INTRAVENOUS at 05:33

## 2023-07-02 RX ADMIN — AMIODARONE HYDROCHLORIDE 0.5 MG/MIN: 50 INJECTION, SOLUTION INTRAVENOUS at 06:19

## 2023-07-02 RX ADMIN — CEFEPIME 2000 MG: 2 INJECTION, POWDER, FOR SOLUTION INTRAVENOUS at 12:38

## 2023-07-02 RX ADMIN — CALCIUM GLUCONATE 1000 MG: 10 INJECTION, SOLUTION INTRAVENOUS at 12:32

## 2023-07-02 ASSESSMENT — PAIN DESCRIPTION - ORIENTATION: ORIENTATION: LEFT;RIGHT

## 2023-07-02 ASSESSMENT — ENCOUNTER SYMPTOMS
ABDOMINAL PAIN: 0
APNEA: 0
SHORTNESS OF BREATH: 0
COLOR CHANGE: 0
CHEST TIGHTNESS: 0
ABDOMINAL DISTENTION: 1

## 2023-07-02 ASSESSMENT — PAIN SCALES - GENERAL
PAINLEVEL_OUTOF10: 3
PAINLEVEL_OUTOF10: 0
PAINLEVEL_OUTOF10: 1

## 2023-07-02 ASSESSMENT — PAIN DESCRIPTION - LOCATION: LOCATION: BUTTOCKS

## 2023-07-02 ASSESSMENT — PAIN DESCRIPTION - DESCRIPTORS: DESCRIPTORS: SORE

## 2023-07-02 ASSESSMENT — PAIN SCALES - WONG BAKER: WONGBAKER_NUMERICALRESPONSE: 0

## 2023-07-02 ASSESSMENT — PAIN - FUNCTIONAL ASSESSMENT: PAIN_FUNCTIONAL_ASSESSMENT: PREVENTS OR INTERFERES SOME ACTIVE ACTIVITIES AND ADLS

## 2023-07-03 PROBLEM — R53.81 PHYSICAL DECONDITIONING: Status: ACTIVE | Noted: 2023-07-03

## 2023-07-03 LAB
ACANTHOCYTES BLD QL SMEAR: ABNORMAL
ALBUMIN SERPL-MCNC: 2.2 G/DL (ref 3.4–5)
ANION GAP SERPL CALCULATED.3IONS-SCNC: 10 MMOL/L (ref 3–16)
ANISOCYTOSIS BLD QL SMEAR: ABNORMAL
BACTERIA BLD CULT ORG #2: NORMAL
BACTERIA BLD CULT: NORMAL
BASOPHILS # BLD: 0 K/UL (ref 0–0.2)
BASOPHILS NFR BLD: 0 %
BUN SERPL-MCNC: 33 MG/DL (ref 7–20)
CA-I BLD-SCNC: 0.77 MMOL/L (ref 1.12–1.32)
CALCIUM SERPL-MCNC: 5.7 MG/DL (ref 8.3–10.6)
CHLORIDE SERPL-SCNC: 100 MMOL/L (ref 99–110)
CO2 SERPL-SCNC: 20 MMOL/L (ref 21–32)
COPPER SERPL-MCNC: 156.7 UG/DL (ref 70–140)
CREAT SERPL-MCNC: 0.9 MG/DL (ref 0.8–1.3)
DACRYOCYTES BLD QL SMEAR: ABNORMAL
DEPRECATED RDW RBC AUTO: 16.1 % (ref 12.4–15.4)
EOSINOPHIL # BLD: 0 K/UL (ref 0–0.6)
EOSINOPHIL NFR BLD: 0 %
GFR SERPLBLD CREATININE-BSD FMLA CKD-EPI: >60 ML/MIN/{1.73_M2}
GLUCOSE SERPL-MCNC: 151 MG/DL (ref 70–99)
HCT VFR BLD AUTO: 24.1 % (ref 40.5–52.5)
HGB BLD-MCNC: 8.1 G/DL (ref 13.5–17.5)
LYMPHOCYTES # BLD: 0.1 K/UL (ref 1–5.1)
LYMPHOCYTES NFR BLD: 6 %
MCH RBC QN AUTO: 30.4 PG (ref 26–34)
MCHC RBC AUTO-ENTMCNC: 33.7 G/DL (ref 31–36)
MCV RBC AUTO: 90.3 FL (ref 80–100)
MICROCYTES BLD QL SMEAR: ABNORMAL
MONOCYTES # BLD: 0 K/UL (ref 0–1.3)
MONOCYTES NFR BLD: 2 %
NEUTROPHILS # BLD: 1.8 K/UL (ref 1.7–7.7)
NEUTROPHILS NFR BLD: 86 %
NEUTS BAND NFR BLD MANUAL: 6 % (ref 0–7)
OVALOCYTES BLD QL SMEAR: ABNORMAL
PATH INTERP BLD-IMP: NORMAL
PH VENOUS: 7.35 (ref 7.35–7.45)
PHOSPHATE SERPL-MCNC: 1.7 MG/DL (ref 2.5–4.9)
PLATELET # BLD AUTO: 14 K/UL (ref 135–450)
PLATELET BLD QL SMEAR: ABNORMAL
PMV BLD AUTO: 9.6 FL (ref 5–10.5)
POTASSIUM SERPL-SCNC: 4.4 MMOL/L (ref 3.5–5.1)
RBC # BLD AUTO: 2.67 M/UL (ref 4.2–5.9)
SCHISTOCYTES BLD QL SMEAR: ABNORMAL
SLIDE REVIEW: ABNORMAL
SODIUM SERPL-SCNC: 130 MMOL/L (ref 136–145)
WBC # BLD AUTO: 2 K/UL (ref 4–11)

## 2023-07-03 PROCEDURE — 97162 PT EVAL MOD COMPLEX 30 MIN: CPT

## 2023-07-03 PROCEDURE — 97116 GAIT TRAINING THERAPY: CPT

## 2023-07-03 PROCEDURE — 2580000003 HC RX 258: Performed by: STUDENT IN AN ORGANIZED HEALTH CARE EDUCATION/TRAINING PROGRAM

## 2023-07-03 PROCEDURE — 85025 COMPLETE CBC W/AUTO DIFF WBC: CPT

## 2023-07-03 PROCEDURE — 99232 SBSQ HOSP IP/OBS MODERATE 35: CPT | Performed by: HOSPITALIST

## 2023-07-03 PROCEDURE — 6370000000 HC RX 637 (ALT 250 FOR IP): Performed by: STUDENT IN AN ORGANIZED HEALTH CARE EDUCATION/TRAINING PROGRAM

## 2023-07-03 PROCEDURE — 2500000003 HC RX 250 WO HCPCS: Performed by: STUDENT IN AN ORGANIZED HEALTH CARE EDUCATION/TRAINING PROGRAM

## 2023-07-03 PROCEDURE — 2060000000 HC ICU INTERMEDIATE R&B

## 2023-07-03 PROCEDURE — 97530 THERAPEUTIC ACTIVITIES: CPT

## 2023-07-03 PROCEDURE — 97535 SELF CARE MNGMENT TRAINING: CPT

## 2023-07-03 PROCEDURE — 99232 SBSQ HOSP IP/OBS MODERATE 35: CPT | Performed by: INTERNAL MEDICINE

## 2023-07-03 PROCEDURE — 80069 RENAL FUNCTION PANEL: CPT

## 2023-07-03 PROCEDURE — 97165 OT EVAL LOW COMPLEX 30 MIN: CPT

## 2023-07-03 PROCEDURE — 6360000002 HC RX W HCPCS: Performed by: STUDENT IN AN ORGANIZED HEALTH CARE EDUCATION/TRAINING PROGRAM

## 2023-07-03 PROCEDURE — 6360000002 HC RX W HCPCS

## 2023-07-03 RX ORDER — CALCIUM GLUCONATE 10 MG/ML
1000 INJECTION, SOLUTION INTRAVENOUS ONCE
Status: COMPLETED | OUTPATIENT
Start: 2023-07-03 | End: 2023-07-04

## 2023-07-03 RX ADMIN — DEXAMETHASONE 4 MG: 4 TABLET ORAL at 08:10

## 2023-07-03 RX ADMIN — AMIODARONE HYDROCHLORIDE 200 MG: 200 TABLET ORAL at 21:30

## 2023-07-03 RX ADMIN — SODIUM CHLORIDE 25 ML: 9 INJECTION, SOLUTION INTRAVENOUS at 23:01

## 2023-07-03 RX ADMIN — SENNOSIDES 17.2 MG: 8.6 TABLET, FILM COATED ORAL at 08:10

## 2023-07-03 RX ADMIN — MORPHINE SULFATE 15 MG: 15 TABLET, FILM COATED, EXTENDED RELEASE ORAL at 08:10

## 2023-07-03 RX ADMIN — SODIUM CHLORIDE, PRESERVATIVE FREE 10 ML: 5 INJECTION INTRAVENOUS at 08:12

## 2023-07-03 RX ADMIN — METOPROLOL SUCCINATE 50 MG: 50 TABLET, EXTENDED RELEASE ORAL at 08:10

## 2023-07-03 RX ADMIN — PANTOPRAZOLE SODIUM 40 MG: 40 TABLET, DELAYED RELEASE ORAL at 05:20

## 2023-07-03 RX ADMIN — MORPHINE SULFATE 15 MG: 15 TABLET, FILM COATED, EXTENDED RELEASE ORAL at 21:30

## 2023-07-03 RX ADMIN — DEXAMETHASONE 4 MG: 4 TABLET ORAL at 17:32

## 2023-07-03 RX ADMIN — CALCIUM GLUCONATE 1000 MG: 10 INJECTION, SOLUTION INTRAVENOUS at 23:02

## 2023-07-03 RX ADMIN — SODIUM PHOSPHATE, MONOBASIC, MONOHYDRATE AND SODIUM PHOSPHATE, DIBASIC, ANHYDROUS 15 MMOL: 142; 276 INJECTION, SOLUTION INTRAVENOUS at 08:20

## 2023-07-03 RX ADMIN — LATANOPROST 1 DROP: 50 SOLUTION OPHTHALMIC at 23:02

## 2023-07-03 RX ADMIN — SODIUM CHLORIDE 25 ML: 9 INJECTION, SOLUTION INTRAVENOUS at 05:04

## 2023-07-03 RX ADMIN — AMIODARONE HYDROCHLORIDE 200 MG: 200 TABLET ORAL at 08:10

## 2023-07-03 RX ADMIN — CEFEPIME 2000 MG: 2 INJECTION, POWDER, FOR SOLUTION INTRAVENOUS at 05:04

## 2023-07-03 RX ADMIN — SODIUM CHLORIDE, PRESERVATIVE FREE 10 ML: 5 INJECTION INTRAVENOUS at 21:30

## 2023-07-03 ASSESSMENT — PAIN DESCRIPTION - ONSET: ONSET: ON-GOING

## 2023-07-03 ASSESSMENT — PAIN DESCRIPTION - ORIENTATION: ORIENTATION: LEFT;RIGHT

## 2023-07-03 ASSESSMENT — PAIN SCALES - WONG BAKER
WONGBAKER_NUMERICALRESPONSE: 0
WONGBAKER_NUMERICALRESPONSE: 8

## 2023-07-03 ASSESSMENT — PAIN DESCRIPTION - FREQUENCY: FREQUENCY: INTERMITTENT

## 2023-07-03 ASSESSMENT — PAIN DESCRIPTION - LOCATION: LOCATION: BUTTOCKS

## 2023-07-03 ASSESSMENT — PAIN SCALES - GENERAL
PAINLEVEL_OUTOF10: 0
PAINLEVEL_OUTOF10: 7

## 2023-07-03 ASSESSMENT — PAIN DESCRIPTION - DESCRIPTORS: DESCRIPTORS: SORE

## 2023-07-03 ASSESSMENT — PAIN - FUNCTIONAL ASSESSMENT: PAIN_FUNCTIONAL_ASSESSMENT: PREVENTS OR INTERFERES SOME ACTIVE ACTIVITIES AND ADLS

## 2023-07-03 ASSESSMENT — PAIN DESCRIPTION - PAIN TYPE: TYPE: ACUTE PAIN

## 2023-07-03 NOTE — CONSULTS
Clinical Pharmacy Progress Note    Vancomycin has been discontinued - Pharmacy will sign off on dosing  If resumed, please re-consult Pharmacy     Please call with questions:  [phone] ([address] [place] [address])    Spring GARAY BCPS    7/3/2023 9:12 AM

## 2023-07-03 NOTE — PLAN OF CARE
Problem: Discharge Planning  Goal: Discharge to home or other facility with appropriate resources  7/3/2023 1548 by Bhumika Estrada RN  Outcome: Progressing  Flowsheets (Taken 7/3/2023 0323 by Neena Rogers RN)  Discharge to home or other facility with appropriate resources: Identify barriers to discharge with patient and caregiver  7/3/2023 0323 by Neena Rogers RN  Outcome: Progressing  Flowsheets (Taken 7/3/2023 0323)  Discharge to home or other facility with appropriate resources: Identify barriers to discharge with patient and caregiver     Problem: Pain  Goal: Verbalizes/displays adequate comfort level or baseline comfort level  7/3/2023 1548 by Bhumika Estrada RN  Outcome: Progressing  Flowsheets (Taken 7/3/2023 0323 by Neena Rogers RN)  Verbalizes/displays adequate comfort level or baseline comfort level:   Encourage patient to monitor pain and request assistance   Assess pain using appropriate pain scale   Implement non-pharmacological measures as appropriate and evaluate response   Consider cultural and social influences on pain and pain management   Notify Licensed Independent Practitioner if interventions unsuccessful or patient reports new pain  7/3/2023 0323 by Neena Rogers RN  Outcome: Progressing  Flowsheets (Taken 7/3/2023 0323)  Verbalizes/displays adequate comfort level or baseline comfort level:   Encourage patient to monitor pain and request assistance   Assess pain using appropriate pain scale   Implement non-pharmacological measures as appropriate and evaluate response   Consider cultural and social influences on pain and pain management   Notify Licensed Independent Practitioner if interventions unsuccessful or patient reports new pain     Problem: ABCDS Injury Assessment  Goal: Absence of physical injury  7/3/2023 1548 by Bhumika Estrada RN  Outcome: Progressing  Flowsheets (Taken 7/3/2023 0323 by Neena Rogers RN)  Absence of Physical Injury: Implement safety measures based on

## 2023-07-03 NOTE — PLAN OF CARE
Problem: Discharge Planning  Goal: Discharge to home or other facility with appropriate resources  7/3/2023 0323 by Kvng Delgado RN  Outcome: Progressing  Flowsheets (Taken 7/3/2023 6350)  Discharge to home or other facility with appropriate resources: Identify barriers to discharge with patient and caregiver     Problem: Pain  Goal: Verbalizes/displays adequate comfort level or baseline comfort level  7/3/2023 0323 by Kvng Delgado RN  Outcome: Progressing  Flowsheets (Taken 7/3/2023 0323)  Verbalizes/displays adequate comfort level or baseline comfort level:   Encourage patient to monitor pain and request assistance   Assess pain using appropriate pain scale   Implement non-pharmacological measures as appropriate and evaluate response   Consider cultural and social influences on pain and pain management   Notify Licensed Independent Practitioner if interventions unsuccessful or patient reports new pain     Problem: ABCDS Injury Assessment  Goal: Absence of physical injury  7/3/2023 0323 by Kvng Delgado RN  Outcome: Progressing  Flowsheets (Taken 7/3/2023 0323)  Absence of Physical Injury: Implement safety measures based on patient assessment     Problem: Safety - Adult  Goal: Free from fall injury  7/3/2023 0323 by Kvng Delgado RN  Outcome: Progressing  Note: Pt is a Fall Risk. See Keren Potters Fall Risk Score. Pt bed in low position and side rails up. Call light and belongings in reach. Pt encouraged to call for assistance. Will continue with hourly rounds for PO intake, pain needs, toileting, and repositioning as needed. Fall precautions and neutropenic precautions placed.       Problem: Cardiovascular - Adult  Goal: Maintains optimal cardiac output and hemodynamic stability  Outcome: Progressing  Flowsheets (Taken 7/3/2023 0325)  Maintains optimal cardiac output and hemodynamic stability:   Monitor blood pressure and heart rate   Monitor urine output and notify Licensed Independent Practitioner

## 2023-07-03 NOTE — PLAN OF CARE
Problem: Discharge Planning  Goal: Discharge to home or other facility with appropriate resources  7/2/2023 2307 by Noa Mclain RN  Outcome: Progressing  7/2/2023 1727 by John Urbina RN  Outcome: Progressing  Flowsheets (Taken 7/2/2023 0800)  Discharge to home or other facility with appropriate resources:   Identify barriers to discharge with patient and caregiver   Arrange for needed discharge resources and transportation as appropriate   Identify discharge learning needs (meds, wound care, etc)     Problem: Pain  Goal: Verbalizes/displays adequate comfort level or baseline comfort level  7/2/2023 2307 by Noa Mclain RN  Outcome: Progressing  7/2/2023 1727 by John Urbina RN  Outcome: Progressing  Flowsheets (Taken 7/2/2023 0800)  Verbalizes/displays adequate comfort level or baseline comfort level:   Encourage patient to monitor pain and request assistance   Assess pain using appropriate pain scale   Administer analgesics based on type and severity of pain and evaluate response   Implement non-pharmacological measures as appropriate and evaluate response   Consider cultural and social influences on pain and pain management     Problem: ABCDS Injury Assessment  Goal: Absence of physical injury  7/2/2023 2307 by Noa Mclain RN  Outcome: Progressing  7/2/2023 1727 by John Urbina RN  Outcome: Progressing  Flowsheets (Taken 7/2/2023 1119)  Absence of Physical Injury: Implement safety measures based on patient assessment     Problem: Safety - Adult  Goal: Free from fall injury  7/2/2023 2307 by Noa Mclain RN  Outcome: Progressing  7/2/2023 1727 by John Urbina RN  Outcome: Progressing     Problem: Chronic Conditions and Co-morbidities  Goal: Patient's chronic conditions and co-morbidity symptoms are monitored and maintained or improved  7/2/2023 2307 by Noa Mclain RN  Outcome: Progressing  7/2/2023 1727 by John Urbina RN  Outcome: Progressing  Flowsheets (Taken

## 2023-07-04 LAB
ALBUMIN SERPL-MCNC: 2.4 G/DL (ref 3.4–5)
ALBUMIN SERPL-MCNC: 2.4 G/DL (ref 3.4–5)
ALBUMIN/GLOB SERPL: 0.9 {RATIO} (ref 1.1–2.2)
ALP SERPL-CCNC: 190 U/L (ref 40–129)
ALT SERPL-CCNC: 75 U/L (ref 10–40)
ANION GAP SERPL CALCULATED.3IONS-SCNC: 15 MMOL/L (ref 3–16)
ANION GAP SERPL CALCULATED.3IONS-SCNC: 8 MMOL/L (ref 3–16)
ANISOCYTOSIS BLD QL SMEAR: ABNORMAL
AST SERPL-CCNC: 26 U/L (ref 15–37)
BASOPHILS # BLD: 0 K/UL (ref 0–0.2)
BASOPHILS NFR BLD: 0 %
BILIRUB SERPL-MCNC: 0.5 MG/DL (ref 0–1)
BUN SERPL-MCNC: 33 MG/DL (ref 7–20)
BUN SERPL-MCNC: 36 MG/DL (ref 7–20)
CALCIUM SERPL-MCNC: 5.8 MG/DL (ref 8.3–10.6)
CALCIUM SERPL-MCNC: 6.7 MG/DL (ref 8.3–10.6)
CHLORIDE SERPL-SCNC: 100 MMOL/L (ref 99–110)
CHLORIDE SERPL-SCNC: 101 MMOL/L (ref 99–110)
CO2 SERPL-SCNC: 19 MMOL/L (ref 21–32)
CO2 SERPL-SCNC: 23 MMOL/L (ref 21–32)
CREAT SERPL-MCNC: 0.9 MG/DL (ref 0.8–1.3)
CREAT SERPL-MCNC: 1 MG/DL (ref 0.8–1.3)
DEPRECATED RDW RBC AUTO: 16.2 % (ref 12.4–15.4)
DOHLE BOD BLD QL SMEAR: PRESENT
EOSINOPHIL # BLD: 0 K/UL (ref 0–0.6)
EOSINOPHIL NFR BLD: 0 %
GFR SERPLBLD CREATININE-BSD FMLA CKD-EPI: >60 ML/MIN/{1.73_M2}
GFR SERPLBLD CREATININE-BSD FMLA CKD-EPI: >60 ML/MIN/{1.73_M2}
GLUCOSE SERPL-MCNC: 141 MG/DL (ref 70–99)
GLUCOSE SERPL-MCNC: 197 MG/DL (ref 70–99)
HCT VFR BLD AUTO: 22.5 % (ref 40.5–52.5)
HGB BLD-MCNC: 7.8 G/DL (ref 13.5–17.5)
LYMPHOCYTES # BLD: 0.1 K/UL (ref 1–5.1)
LYMPHOCYTES NFR BLD: 3 %
MAGNESIUM SERPL-MCNC: 1.8 MG/DL (ref 1.8–2.4)
MCH RBC QN AUTO: 30.7 PG (ref 26–34)
MCHC RBC AUTO-ENTMCNC: 34.6 G/DL (ref 31–36)
MCV RBC AUTO: 88.6 FL (ref 80–100)
METAMYELOCYTES NFR BLD MANUAL: 8 %
MONOCYTES # BLD: 0.4 K/UL (ref 0–1.3)
MONOCYTES NFR BLD: 11 %
MYELOCYTES NFR BLD MANUAL: 1 %
NEUTROPHILS # BLD: 2.8 K/UL (ref 1.7–7.7)
NEUTROPHILS NFR BLD: 53 %
NEUTS BAND NFR BLD MANUAL: 24 % (ref 0–7)
NRBC BLD-RTO: 3 /100 WBC
PHOSPHATE SERPL-MCNC: 1.6 MG/DL (ref 2.5–4.9)
PLATELET # BLD AUTO: 12 K/UL (ref 135–450)
PLATELET BLD QL SMEAR: ABNORMAL
PMV BLD AUTO: 9.5 FL (ref 5–10.5)
POTASSIUM SERPL-SCNC: 4.5 MMOL/L (ref 3.5–5.1)
POTASSIUM SERPL-SCNC: 4.5 MMOL/L (ref 3.5–5.1)
PROT SERPL-MCNC: 5.2 G/DL (ref 6.4–8.2)
RBC # BLD AUTO: 2.54 M/UL (ref 4.2–5.9)
SLIDE REVIEW: ABNORMAL
SODIUM SERPL-SCNC: 132 MMOL/L (ref 136–145)
SODIUM SERPL-SCNC: 134 MMOL/L (ref 136–145)
TOXIC GRANULES BLD QL SMEAR: PRESENT
WBC # BLD AUTO: 3.3 K/UL (ref 4–11)

## 2023-07-04 PROCEDURE — 99232 SBSQ HOSP IP/OBS MODERATE 35: CPT | Performed by: HOSPITALIST

## 2023-07-04 PROCEDURE — 2580000003 HC RX 258: Performed by: STUDENT IN AN ORGANIZED HEALTH CARE EDUCATION/TRAINING PROGRAM

## 2023-07-04 PROCEDURE — 6360000002 HC RX W HCPCS: Performed by: STUDENT IN AN ORGANIZED HEALTH CARE EDUCATION/TRAINING PROGRAM

## 2023-07-04 PROCEDURE — 83735 ASSAY OF MAGNESIUM: CPT

## 2023-07-04 PROCEDURE — 6370000000 HC RX 637 (ALT 250 FOR IP): Performed by: STUDENT IN AN ORGANIZED HEALTH CARE EDUCATION/TRAINING PROGRAM

## 2023-07-04 PROCEDURE — 80053 COMPREHEN METABOLIC PANEL: CPT

## 2023-07-04 PROCEDURE — 6360000002 HC RX W HCPCS

## 2023-07-04 PROCEDURE — 2060000000 HC ICU INTERMEDIATE R&B

## 2023-07-04 PROCEDURE — 85025 COMPLETE CBC W/AUTO DIFF WBC: CPT

## 2023-07-04 RX ORDER — CALCIUM GLUCONATE 20 MG/ML
2000 INJECTION, SOLUTION INTRAVENOUS ONCE
Status: COMPLETED | OUTPATIENT
Start: 2023-07-04 | End: 2023-07-04

## 2023-07-04 RX ORDER — CALCIUM GLUCONATE 94 MG/ML
2000 INJECTION, SOLUTION INTRAVENOUS ONCE
Status: DISCONTINUED | OUTPATIENT
Start: 2023-07-04 | End: 2023-07-04

## 2023-07-04 RX ADMIN — HYDROCODONE BITARTRATE AND ACETAMINOPHEN 2 TABLET: 5; 325 TABLET ORAL at 06:06

## 2023-07-04 RX ADMIN — CALCIUM GLUCONATE 2000 MG: 20 INJECTION, SOLUTION INTRAVENOUS at 06:32

## 2023-07-04 RX ADMIN — MORPHINE SULFATE 15 MG: 15 TABLET, FILM COATED, EXTENDED RELEASE ORAL at 20:51

## 2023-07-04 RX ADMIN — AMIODARONE HYDROCHLORIDE 200 MG: 200 TABLET ORAL at 20:51

## 2023-07-04 RX ADMIN — SENNOSIDES 17.2 MG: 8.6 TABLET, FILM COATED ORAL at 08:04

## 2023-07-04 RX ADMIN — DEXAMETHASONE 4 MG: 4 TABLET ORAL at 08:04

## 2023-07-04 RX ADMIN — AMIODARONE HYDROCHLORIDE 200 MG: 200 TABLET ORAL at 08:04

## 2023-07-04 RX ADMIN — SODIUM CHLORIDE, PRESERVATIVE FREE 10 ML: 5 INJECTION INTRAVENOUS at 20:51

## 2023-07-04 RX ADMIN — PANTOPRAZOLE SODIUM 40 MG: 40 TABLET, DELAYED RELEASE ORAL at 05:27

## 2023-07-04 RX ADMIN — METOPROLOL SUCCINATE 50 MG: 50 TABLET, EXTENDED RELEASE ORAL at 08:04

## 2023-07-04 RX ADMIN — MORPHINE SULFATE 15 MG: 15 TABLET, FILM COATED, EXTENDED RELEASE ORAL at 08:04

## 2023-07-04 RX ADMIN — DEXAMETHASONE 4 MG: 4 TABLET ORAL at 16:58

## 2023-07-04 RX ADMIN — SODIUM CHLORIDE 5 ML: 9 INJECTION, SOLUTION INTRAVENOUS at 06:32

## 2023-07-04 ASSESSMENT — PAIN DESCRIPTION - PAIN TYPE
TYPE: ACUTE PAIN
TYPE: ACUTE PAIN

## 2023-07-04 ASSESSMENT — PAIN SCALES - GENERAL
PAINLEVEL_OUTOF10: 7
PAINLEVEL_OUTOF10: 3

## 2023-07-04 ASSESSMENT — PAIN DESCRIPTION - LOCATION
LOCATION: BUTTOCKS
LOCATION: BUTTOCKS

## 2023-07-04 ASSESSMENT — PAIN DESCRIPTION - DESCRIPTORS
DESCRIPTORS: SORE
DESCRIPTORS: SORE

## 2023-07-04 ASSESSMENT — PAIN DESCRIPTION - ONSET
ONSET: ON-GOING
ONSET: ON-GOING

## 2023-07-04 ASSESSMENT — PAIN SCALES - WONG BAKER
WONGBAKER_NUMERICALRESPONSE: 2
WONGBAKER_NUMERICALRESPONSE: 8

## 2023-07-04 ASSESSMENT — PAIN DESCRIPTION - FREQUENCY
FREQUENCY: INTERMITTENT
FREQUENCY: INTERMITTENT

## 2023-07-04 ASSESSMENT — PAIN DESCRIPTION - ORIENTATION
ORIENTATION: LEFT;RIGHT
ORIENTATION: LEFT;RIGHT

## 2023-07-04 ASSESSMENT — PAIN - FUNCTIONAL ASSESSMENT
PAIN_FUNCTIONAL_ASSESSMENT: PREVENTS OR INTERFERES SOME ACTIVE ACTIVITIES AND ADLS
PAIN_FUNCTIONAL_ASSESSMENT: PREVENTS OR INTERFERES SOME ACTIVE ACTIVITIES AND ADLS

## 2023-07-04 NOTE — PLAN OF CARE
Problem: Discharge Planning  Goal: Discharge to home or other facility with appropriate resources  7/3/2023 2119 by Zohreh Car RN  Outcome: Progressing  7/3/2023 1548 by Anuja Taylor RN  Outcome: Progressing  Flowsheets (Taken 7/3/2023 0323 by Gayla Eagle RN)  Discharge to home or other facility with appropriate resources: Identify barriers to discharge with patient and caregiver     Problem: Pain  Goal: Verbalizes/displays adequate comfort level or baseline comfort level  7/3/2023 2119 by Zohreh Car RN  Outcome: Progressing  7/3/2023 1548 by Anuja Taylor RN  Outcome: Progressing  Flowsheets (Taken 7/3/2023 0323 by Gayla Eagle, RN)  Verbalizes/displays adequate comfort level or baseline comfort level:   Encourage patient to monitor pain and request assistance   Assess pain using appropriate pain scale   Implement non-pharmacological measures as appropriate and evaluate response   Consider cultural and social influences on pain and pain management   Notify Licensed Independent Practitioner if interventions unsuccessful or patient reports new pain     Problem: ABCDS Injury Assessment  Goal: Absence of physical injury  7/3/2023 2119 by Zohreh Car RN  Outcome: Progressing  7/3/2023 1548 by Anuja Taylor RN  Outcome: Progressing  Flowsheets (Taken 7/3/2023 0323 by Gayla Eagle RN)  Absence of Physical Injury: Implement safety measures based on patient assessment     Problem: Safety - Adult  Goal: Free from fall injury  7/3/2023 2119 by Zohreh Car RN  Outcome: Progressing  7/3/2023 1548 by Anuja Taylor RN  Outcome: Progressing  Flowsheets (Taken 7/3/2023 1548)  Free From Fall Injury: Tigist Hauser family/caregiver on patient safety     Problem: Cardiovascular - Adult  Goal: Maintains optimal cardiac output and hemodynamic stability  7/3/2023 2119 by Zohreh Car RN  Outcome: Progressing  7/3/2023 1548 by Anuja Taylor RN  Outcome: Progressing  Flowsheets (Taken 7/3/2023 0325 by Sheela Dejesus

## 2023-07-05 LAB
ALBUMIN SERPL-MCNC: 2.4 G/DL (ref 3.4–5)
ANION GAP SERPL CALCULATED.3IONS-SCNC: 8 MMOL/L (ref 3–16)
BASOPHILS # BLD: 0 K/UL (ref 0–0.2)
BASOPHILS NFR BLD: 0.1 %
BUN SERPL-MCNC: 34 MG/DL (ref 7–20)
CALCIUM SERPL-MCNC: 6.3 MG/DL (ref 8.3–10.6)
CHLORIDE SERPL-SCNC: 103 MMOL/L (ref 99–110)
CO2 SERPL-SCNC: 23 MMOL/L (ref 21–32)
CREAT SERPL-MCNC: 0.8 MG/DL (ref 0.8–1.3)
DEPRECATED RDW RBC AUTO: 16.9 % (ref 12.4–15.4)
EOSINOPHIL # BLD: 0 K/UL (ref 0–0.6)
EOSINOPHIL NFR BLD: 0 %
GFR SERPLBLD CREATININE-BSD FMLA CKD-EPI: >60 ML/MIN/{1.73_M2}
GLUCOSE SERPL-MCNC: 140 MG/DL (ref 70–99)
HCT VFR BLD AUTO: 23.2 % (ref 40.5–52.5)
HGB BLD-MCNC: 8 G/DL (ref 13.5–17.5)
LYMPHOCYTES # BLD: 0 K/UL (ref 1–5.1)
LYMPHOCYTES NFR BLD: 1.1 %
MAGNESIUM SERPL-MCNC: 1.7 MG/DL (ref 1.8–2.4)
MCH RBC QN AUTO: 31 PG (ref 26–34)
MCHC RBC AUTO-ENTMCNC: 34.4 G/DL (ref 31–36)
MCV RBC AUTO: 90.1 FL (ref 80–100)
MONOCYTES # BLD: 0.2 K/UL (ref 0–1.3)
MONOCYTES NFR BLD: 4.9 %
NEUTROPHILS # BLD: 3 K/UL (ref 1.7–7.7)
NEUTROPHILS NFR BLD: 93.9 %
PHOSPHATE SERPL-MCNC: 2 MG/DL (ref 2.5–4.9)
PLATELET # BLD AUTO: 11 K/UL (ref 135–450)
PMV BLD AUTO: 10.2 FL (ref 5–10.5)
POTASSIUM SERPL-SCNC: 4.6 MMOL/L (ref 3.5–5.1)
RBC # BLD AUTO: 2.58 M/UL (ref 4.2–5.9)
SLIDE REVIEW: ABNORMAL
SODIUM SERPL-SCNC: 134 MMOL/L (ref 136–145)
WBC # BLD AUTO: 3.2 K/UL (ref 4–11)

## 2023-07-05 PROCEDURE — 6370000000 HC RX 637 (ALT 250 FOR IP): Performed by: STUDENT IN AN ORGANIZED HEALTH CARE EDUCATION/TRAINING PROGRAM

## 2023-07-05 PROCEDURE — 6360000002 HC RX W HCPCS

## 2023-07-05 PROCEDURE — 97116 GAIT TRAINING THERAPY: CPT

## 2023-07-05 PROCEDURE — 99232 SBSQ HOSP IP/OBS MODERATE 35: CPT | Performed by: HOSPITALIST

## 2023-07-05 PROCEDURE — 6360000002 HC RX W HCPCS: Performed by: STUDENT IN AN ORGANIZED HEALTH CARE EDUCATION/TRAINING PROGRAM

## 2023-07-05 PROCEDURE — 36592 COLLECT BLOOD FROM PICC: CPT

## 2023-07-05 PROCEDURE — 85025 COMPLETE CBC W/AUTO DIFF WBC: CPT

## 2023-07-05 PROCEDURE — 2580000003 HC RX 258

## 2023-07-05 PROCEDURE — 80069 RENAL FUNCTION PANEL: CPT

## 2023-07-05 PROCEDURE — 2580000003 HC RX 258: Performed by: STUDENT IN AN ORGANIZED HEALTH CARE EDUCATION/TRAINING PROGRAM

## 2023-07-05 PROCEDURE — 2580000003 HC RX 258: Performed by: HOSPITALIST

## 2023-07-05 PROCEDURE — 83735 ASSAY OF MAGNESIUM: CPT

## 2023-07-05 PROCEDURE — 2500000003 HC RX 250 WO HCPCS

## 2023-07-05 PROCEDURE — 2060000000 HC ICU INTERMEDIATE R&B

## 2023-07-05 PROCEDURE — 97530 THERAPEUTIC ACTIVITIES: CPT

## 2023-07-05 RX ORDER — MAGNESIUM SULFATE IN WATER 40 MG/ML
2000 INJECTION, SOLUTION INTRAVENOUS ONCE
Status: COMPLETED | OUTPATIENT
Start: 2023-07-05 | End: 2023-07-05

## 2023-07-05 RX ORDER — 0.9 % SODIUM CHLORIDE 0.9 %
500 INTRAVENOUS SOLUTION INTRAVENOUS ONCE
Status: COMPLETED | OUTPATIENT
Start: 2023-07-05 | End: 2023-07-05

## 2023-07-05 RX ORDER — CALCIUM GLUCONATE 20 MG/ML
2000 INJECTION, SOLUTION INTRAVENOUS ONCE
Status: COMPLETED | OUTPATIENT
Start: 2023-07-05 | End: 2023-07-05

## 2023-07-05 RX ADMIN — AMIODARONE HYDROCHLORIDE 200 MG: 200 TABLET ORAL at 08:24

## 2023-07-05 RX ADMIN — SODIUM CHLORIDE, PRESERVATIVE FREE 10 ML: 5 INJECTION INTRAVENOUS at 21:02

## 2023-07-05 RX ADMIN — SENNOSIDES 17.2 MG: 8.6 TABLET, FILM COATED ORAL at 08:25

## 2023-07-05 RX ADMIN — MAGNESIUM SULFATE HEPTAHYDRATE 2000 MG: 40 INJECTION, SOLUTION INTRAVENOUS at 08:32

## 2023-07-05 RX ADMIN — SODIUM CHLORIDE, PRESERVATIVE FREE 10 ML: 5 INJECTION INTRAVENOUS at 08:25

## 2023-07-05 RX ADMIN — MORPHINE SULFATE 15 MG: 15 TABLET, FILM COATED, EXTENDED RELEASE ORAL at 08:30

## 2023-07-05 RX ADMIN — AMIODARONE HYDROCHLORIDE 200 MG: 200 TABLET ORAL at 21:01

## 2023-07-05 RX ADMIN — DEXAMETHASONE 4 MG: 4 TABLET ORAL at 18:05

## 2023-07-05 RX ADMIN — CALCIUM GLUCONATE 2000 MG: 20 INJECTION, SOLUTION INTRAVENOUS at 12:08

## 2023-07-05 RX ADMIN — SODIUM CHLORIDE 500 ML: 9 INJECTION, SOLUTION INTRAVENOUS at 12:48

## 2023-07-05 RX ADMIN — METOPROLOL SUCCINATE 50 MG: 50 TABLET, EXTENDED RELEASE ORAL at 08:24

## 2023-07-05 RX ADMIN — SODIUM PHOSPHATE, MONOBASIC, MONOHYDRATE AND SODIUM PHOSPHATE, DIBASIC, ANHYDROUS 15 MMOL: 142; 276 INJECTION, SOLUTION INTRAVENOUS at 18:05

## 2023-07-05 RX ADMIN — PANTOPRAZOLE SODIUM 40 MG: 40 TABLET, DELAYED RELEASE ORAL at 05:51

## 2023-07-05 RX ADMIN — MORPHINE SULFATE 15 MG: 15 TABLET, FILM COATED, EXTENDED RELEASE ORAL at 08:25

## 2023-07-05 RX ADMIN — MORPHINE SULFATE 15 MG: 15 TABLET, FILM COATED, EXTENDED RELEASE ORAL at 21:01

## 2023-07-05 RX ADMIN — DEXAMETHASONE 4 MG: 4 TABLET ORAL at 08:25

## 2023-07-05 ASSESSMENT — ENCOUNTER SYMPTOMS
APNEA: 0
CHEST TIGHTNESS: 0
COUGH: 0
SHORTNESS OF BREATH: 0

## 2023-07-05 NOTE — DISCHARGE INSTR - COC
Margins Approximated 06/14/23 1230   Incision Assessment Dry 06/14/23 1230   Drainage Amount None 06/14/23 1230   Odor None 06/14/23 1230   Number of days: 21       Incision 06/14/23 Neck Right (Active)   Dressing Status Clean;Dry; Intact 06/14/23 1230   Dressing/Treatment Surgical glue 06/14/23 1230   Closure Surgical glue 06/14/23 1230   Margins Approximated 06/14/23 1230   Incision Assessment Dry 06/14/23 1230   Drainage Amount None 06/14/23 1230   Odor None 06/14/23 1230   Number of days: 21        Elimination:  Continence: Bowel: No  Bladder: Yes  Urinary Catheter: None   Colostomy/Ileostomy/Ileal Conduit: No       Date of Last BM: 7/7/23    Intake/Output Summary (Last 24 hours) at 7/5/2023 1619  Last data filed at 7/5/2023 1059  Gross per 24 hour   Intake 200 ml   Output --   Net 200 ml     I/O last 3 completed shifts: In: 320 [P.O.:320]  Out: 1160 [Urine:1160]    Safety Concerns:     None    Impairments/Disabilities:      None    Nutrition Therapy:  Current Nutrition Therapy:   - Oral Diet:  General    Routes of Feeding: Oral  Liquids: No Restrictions  Daily Fluid Restriction: no  Last Modified Barium Swallow with Video (Video Swallowing Test): not done    Treatments at the Time of Hospital Discharge:   Respiratory Treatments:   Oxygen Therapy:  is not on home oxygen therapy.   Ventilator:    - No ventilator support    Rehab Therapies: Physical Therapy and Occupational Therapy  Weight Bearing Status/Restrictions: No weight bearing restrictions  Other Medical Equipment (for information only, NOT a DME order):  walker  Other Treatments:     Patient's personal belongings (please select all that are sent with patient):  clothing    RN SIGNATURE:  Electronically signed by Luis Cornejo on 7/7/23 at 10:45 AM EDT    CASE MANAGEMENT/SOCIAL WORK SECTION    Inpatient Status Date: ***    Readmission Risk Assessment Score:  Readmission Risk              Risk of Unplanned Readmission:  34           Discharging to

## 2023-07-05 NOTE — PLAN OF CARE
Problem: Discharge Planning  Goal: Discharge to home or other facility with appropriate resources  7/5/2023 1907 by Viktoria Bowman RN  Outcome: Progressing  7/5/2023 0631 by Ashley Taylor RN  Outcome: Progressing     Problem: Pain  Goal: Verbalizes/displays adequate comfort level or baseline comfort level  7/5/2023 0631 by Ashley Taylor RN  Outcome: Progressing     Problem: ABCDS Injury Assessment  Goal: Absence of physical injury  7/5/2023 0631 by Ashley Taylor RN  Outcome: Progressing     Problem: Safety - Adult  Goal: Free from fall injury  Outcome: Progressing     Problem: Cardiovascular - Adult  Goal: Maintains optimal cardiac output and hemodynamic stability  Outcome: Progressing

## 2023-07-06 PROBLEM — D69.6 THROMBOCYTOPENIA (HCC): Status: ACTIVE | Noted: 2023-01-01

## 2023-07-06 LAB
ALBUMIN SERPL-MCNC: 2.3 G/DL (ref 3.4–5)
ALBUMIN SERPL-MCNC: 2.4 G/DL (ref 3.4–5)
ALBUMIN/GLOB SERPL: 0.9 {RATIO} (ref 1.1–2.2)
ALP SERPL-CCNC: 168 U/L (ref 40–129)
ALT SERPL-CCNC: 43 U/L (ref 10–40)
ANION GAP SERPL CALCULATED.3IONS-SCNC: 15 MMOL/L (ref 3–16)
ANION GAP SERPL CALCULATED.3IONS-SCNC: 9 MMOL/L (ref 3–16)
AST SERPL-CCNC: 17 U/L (ref 15–37)
BASOPHILS # BLD: 0 K/UL (ref 0–0.2)
BASOPHILS NFR BLD: 0 %
BILIRUB SERPL-MCNC: 0.6 MG/DL (ref 0–1)
BUN SERPL-MCNC: 26 MG/DL (ref 7–20)
BUN SERPL-MCNC: 29 MG/DL (ref 7–20)
CALCIUM SERPL-MCNC: 6.5 MG/DL (ref 8.3–10.6)
CALCIUM SERPL-MCNC: 7 MG/DL (ref 8.3–10.6)
CHLORIDE SERPL-SCNC: 100 MMOL/L (ref 99–110)
CHLORIDE SERPL-SCNC: 104 MMOL/L (ref 99–110)
CO2 SERPL-SCNC: 19 MMOL/L (ref 21–32)
CO2 SERPL-SCNC: 24 MMOL/L (ref 21–32)
CREAT SERPL-MCNC: 0.8 MG/DL (ref 0.8–1.3)
CREAT SERPL-MCNC: 0.9 MG/DL (ref 0.8–1.3)
DEPRECATED RDW RBC AUTO: 17.2 % (ref 12.4–15.4)
EOSINOPHIL # BLD: 0 K/UL (ref 0–0.6)
EOSINOPHIL NFR BLD: 0 %
GFR SERPLBLD CREATININE-BSD FMLA CKD-EPI: >60 ML/MIN/{1.73_M2}
GFR SERPLBLD CREATININE-BSD FMLA CKD-EPI: >60 ML/MIN/{1.73_M2}
GLUCOSE SERPL-MCNC: 129 MG/DL (ref 70–99)
GLUCOSE SERPL-MCNC: 187 MG/DL (ref 70–99)
HCT VFR BLD AUTO: 22.3 % (ref 40.5–52.5)
HGB BLD-MCNC: 7.6 G/DL (ref 13.5–17.5)
LYMPHOCYTES # BLD: 0 K/UL (ref 1–5.1)
LYMPHOCYTES NFR BLD: 1 %
MAGNESIUM SERPL-MCNC: 1.7 MG/DL (ref 1.8–2.4)
MCH RBC QN AUTO: 30.8 PG (ref 26–34)
MCHC RBC AUTO-ENTMCNC: 34 G/DL (ref 31–36)
MCV RBC AUTO: 90.6 FL (ref 80–100)
METAMYELOCYTES NFR BLD MANUAL: 1 %
MONOCYTES # BLD: 0 K/UL (ref 0–1.3)
MONOCYTES NFR BLD: 1 %
MYELOCYTES NFR BLD MANUAL: 2 %
NEUTROPHILS # BLD: 3.3 K/UL (ref 1.7–7.7)
NEUTROPHILS NFR BLD: 88 %
NEUTS BAND NFR BLD MANUAL: 7 % (ref 0–7)
PHOSPHATE SERPL-MCNC: 2.4 MG/DL (ref 2.5–4.9)
PLATELET # BLD AUTO: 11 K/UL (ref 135–450)
PLATELET BLD QL SMEAR: ABNORMAL
PMV BLD AUTO: 9.8 FL (ref 5–10.5)
POTASSIUM SERPL-SCNC: 4.6 MMOL/L (ref 3.5–5.1)
POTASSIUM SERPL-SCNC: 5 MMOL/L (ref 3.5–5.1)
PROT SERPL-MCNC: 5.1 G/DL (ref 6.4–8.2)
RBC # BLD AUTO: 2.46 M/UL (ref 4.2–5.9)
RBC MORPH BLD: NORMAL
SLIDE REVIEW: ABNORMAL
SODIUM SERPL-SCNC: 134 MMOL/L (ref 136–145)
SODIUM SERPL-SCNC: 137 MMOL/L (ref 136–145)
WBC # BLD AUTO: 3.4 K/UL (ref 4–11)

## 2023-07-06 PROCEDURE — 97530 THERAPEUTIC ACTIVITIES: CPT

## 2023-07-06 PROCEDURE — 99232 SBSQ HOSP IP/OBS MODERATE 35: CPT | Performed by: HOSPITALIST

## 2023-07-06 PROCEDURE — 2500000003 HC RX 250 WO HCPCS

## 2023-07-06 PROCEDURE — 2580000003 HC RX 258

## 2023-07-06 PROCEDURE — 2580000003 HC RX 258: Performed by: STUDENT IN AN ORGANIZED HEALTH CARE EDUCATION/TRAINING PROGRAM

## 2023-07-06 PROCEDURE — 2060000000 HC ICU INTERMEDIATE R&B

## 2023-07-06 PROCEDURE — 6360000002 HC RX W HCPCS

## 2023-07-06 PROCEDURE — 83735 ASSAY OF MAGNESIUM: CPT

## 2023-07-06 PROCEDURE — 80053 COMPREHEN METABOLIC PANEL: CPT

## 2023-07-06 PROCEDURE — 6360000002 HC RX W HCPCS: Performed by: NURSE PRACTITIONER

## 2023-07-06 PROCEDURE — 6370000000 HC RX 637 (ALT 250 FOR IP): Performed by: STUDENT IN AN ORGANIZED HEALTH CARE EDUCATION/TRAINING PROGRAM

## 2023-07-06 PROCEDURE — 36592 COLLECT BLOOD FROM PICC: CPT

## 2023-07-06 PROCEDURE — 85025 COMPLETE CBC W/AUTO DIFF WBC: CPT

## 2023-07-06 PROCEDURE — 97535 SELF CARE MNGMENT TRAINING: CPT

## 2023-07-06 RX ORDER — DEXAMETHASONE 4 MG/1
40 TABLET ORAL DAILY
Status: DISCONTINUED | OUTPATIENT
Start: 2023-07-06 | End: 2023-07-07 | Stop reason: HOSPADM

## 2023-07-06 RX ORDER — MAGNESIUM SULFATE IN WATER 40 MG/ML
2000 INJECTION, SOLUTION INTRAVENOUS ONCE
Status: COMPLETED | OUTPATIENT
Start: 2023-07-06 | End: 2023-07-06

## 2023-07-06 RX ADMIN — SODIUM CHLORIDE, PRESERVATIVE FREE 10 ML: 5 INJECTION INTRAVENOUS at 08:47

## 2023-07-06 RX ADMIN — SODIUM CHLORIDE, PRESERVATIVE FREE 5 ML: 5 INJECTION INTRAVENOUS at 22:00

## 2023-07-06 RX ADMIN — MAGNESIUM SULFATE HEPTAHYDRATE 2000 MG: 40 INJECTION, SOLUTION INTRAVENOUS at 11:18

## 2023-07-06 RX ADMIN — PANTOPRAZOLE SODIUM 40 MG: 40 TABLET, DELAYED RELEASE ORAL at 05:14

## 2023-07-06 RX ADMIN — SODIUM PHOSPHATE, MONOBASIC, MONOHYDRATE AND SODIUM PHOSPHATE, DIBASIC, ANHYDROUS 20 MMOL: 142; 276 INJECTION, SOLUTION INTRAVENOUS at 11:16

## 2023-07-06 RX ADMIN — AMIODARONE HYDROCHLORIDE 200 MG: 200 TABLET ORAL at 19:56

## 2023-07-06 RX ADMIN — AMIODARONE HYDROCHLORIDE 200 MG: 200 TABLET ORAL at 08:46

## 2023-07-06 RX ADMIN — MORPHINE SULFATE 15 MG: 15 TABLET, FILM COATED, EXTENDED RELEASE ORAL at 08:46

## 2023-07-06 RX ADMIN — SODIUM CHLORIDE 25 ML: 9 INJECTION, SOLUTION INTRAVENOUS at 11:16

## 2023-07-06 RX ADMIN — DEXAMETHASONE 40 MG: 4 TABLET ORAL at 08:52

## 2023-07-06 RX ADMIN — LATANOPROST 1 DROP: 50 SOLUTION OPHTHALMIC at 22:45

## 2023-07-06 RX ADMIN — SODIUM CHLORIDE 25 ML: 9 INJECTION, SOLUTION INTRAVENOUS at 11:17

## 2023-07-06 RX ADMIN — MORPHINE SULFATE 15 MG: 15 TABLET, FILM COATED, EXTENDED RELEASE ORAL at 19:56

## 2023-07-06 ASSESSMENT — ENCOUNTER SYMPTOMS
APNEA: 0
COUGH: 0
SHORTNESS OF BREATH: 0
CHEST TIGHTNESS: 0

## 2023-07-06 ASSESSMENT — PAIN SCALES - GENERAL
PAINLEVEL_OUTOF10: 0

## 2023-07-06 ASSESSMENT — PAIN SCALES - WONG BAKER
WONGBAKER_NUMERICALRESPONSE: 0

## 2023-07-06 NOTE — PLAN OF CARE
Problem: Discharge Planning  Goal: Discharge to home or other facility with appropriate resources  7/5/2023 1907 by Lida Lennox, RN  Outcome: Progressing     Problem: Pain  Goal: Verbalizes/displays adequate comfort level or baseline comfort level  Outcome: Progressing     Problem: ABCDS Injury Assessment  Goal: Absence of physical injury  Outcome: Progressing     Problem: Safety - Adult  Goal: Free from fall injury  7/6/2023 0451 by Kaylee Laboy RN  Outcome: Progressing  7/5/2023 1907 by Lida Lennox, RN  Outcome: Progressing     Problem: Cardiovascular - Adult  Goal: Maintains optimal cardiac output and hemodynamic stability  7/5/2023 1907 by Lida Lennox, RN  Outcome: Progressing

## 2023-07-06 NOTE — PLAN OF CARE
Problem: ABCDS Injury Assessment  Goal: Absence of physical injury  7/6/2023 1810 by Maura Rios RN  Outcome: Progressing  Flowsheets (Taken 7/6/2023 1810)  Absence of Physical Injury: Implement safety measures based on patient assessment     Problem: Safety - Adult  Goal: Free from fall injury  7/6/2023 1810 by Maura Rios RN  Outcome: Progressing  Flowsheets (Taken 7/6/2023 1810)  Free From Fall Injury:   Instruct family/caregiver on patient safety   Based on caregiver fall risk screen, instruct family/caregiver to ask for assistance with transferring infant if caregiver noted to have fall risk factors     Problem: Cardiovascular - Adult  Goal: Maintains optimal cardiac output and hemodynamic stability  Outcome: Progressing  Flowsheets (Taken 7/6/2023 1810)  Maintains optimal cardiac output and hemodynamic stability:   Monitor blood pressure and heart rate   Assess for signs of decreased cardiac output   Monitor urine output and notify Licensed Independent Practitioner for values outside of normal range     Problem: Cardiovascular - Adult  Goal: Absence of cardiac dysrhythmias or at baseline  Outcome: Progressing  Flowsheets (Taken 7/6/2023 1810)  Absence of cardiac dysrhythmias or at baseline:   Monitor cardiac rate and rhythm   Assess for signs of decreased cardiac output   Administer antiarrhythmia medication and electrolyte replacement as ordered     Problem: Chronic Conditions and Co-morbidities  Goal: Patient's chronic conditions and co-morbidity symptoms are monitored and maintained or improved  Outcome: Progressing  Flowsheets (Taken 7/6/2023 1810)  Care Plan - Patient's Chronic Conditions and Co-Morbidity Symptoms are Monitored and Maintained or Improved:   Monitor and assess patient's chronic conditions and comorbid symptoms for stability, deterioration, or improvement   Collaborate with multidisciplinary team to address chronic and comorbid conditions and prevent exacerbation or deterioration

## 2023-07-07 VITALS
TEMPERATURE: 98 F | BODY MASS INDEX: 18.38 KG/M2 | OXYGEN SATURATION: 99 % | DIASTOLIC BLOOD PRESSURE: 69 MMHG | SYSTOLIC BLOOD PRESSURE: 117 MMHG | HEART RATE: 80 BPM | HEIGHT: 69 IN | WEIGHT: 124.12 LBS | RESPIRATION RATE: 20 BRPM

## 2023-07-07 LAB
ALBUMIN SERPL-MCNC: 2.3 G/DL (ref 3.4–5)
ANION GAP SERPL CALCULATED.3IONS-SCNC: 9 MMOL/L (ref 3–16)
BASOPHILS # BLD: 0 K/UL (ref 0–0.2)
BASOPHILS NFR BLD: 0 %
BUN SERPL-MCNC: 29 MG/DL (ref 7–20)
CALCIUM SERPL-MCNC: 6.6 MG/DL (ref 8.3–10.6)
CHLORIDE SERPL-SCNC: 102 MMOL/L (ref 99–110)
CO2 SERPL-SCNC: 22 MMOL/L (ref 21–32)
CREAT SERPL-MCNC: 0.8 MG/DL (ref 0.8–1.3)
DEPRECATED RDW RBC AUTO: 17.2 % (ref 12.4–15.4)
DOHLE BOD BLD QL SMEAR: PRESENT
EOSINOPHIL # BLD: 0 K/UL (ref 0–0.6)
EOSINOPHIL NFR BLD: 0 %
GFR SERPLBLD CREATININE-BSD FMLA CKD-EPI: >60 ML/MIN/{1.73_M2}
GLUCOSE SERPL-MCNC: 153 MG/DL (ref 70–99)
HCT VFR BLD AUTO: 23.3 % (ref 40.5–52.5)
HGB BLD-MCNC: 7.8 G/DL (ref 13.5–17.5)
LYMPHOCYTES # BLD: 0.2 K/UL (ref 1–5.1)
LYMPHOCYTES NFR BLD: 7 %
MAGNESIUM SERPL-MCNC: 1.9 MG/DL (ref 1.8–2.4)
MCH RBC QN AUTO: 30.8 PG (ref 26–34)
MCHC RBC AUTO-ENTMCNC: 33.5 G/DL (ref 31–36)
MCV RBC AUTO: 92 FL (ref 80–100)
METAMYELOCYTES NFR BLD MANUAL: 1 %
MONOCYTES # BLD: 0.1 K/UL (ref 0–1.3)
MONOCYTES NFR BLD: 4 %
MYELOCYTES NFR BLD MANUAL: 1 %
NEUTROPHILS # BLD: 3.1 K/UL (ref 1.7–7.7)
NEUTROPHILS NFR BLD: 87 %
NRBC BLD-RTO: 8 /100 WBC
PHOSPHATE SERPL-MCNC: 2.7 MG/DL (ref 2.5–4.9)
PLATELET # BLD AUTO: 14 K/UL (ref 135–450)
PMV BLD AUTO: 9.5 FL (ref 5–10.5)
POTASSIUM SERPL-SCNC: 4.9 MMOL/L (ref 3.5–5.1)
RBC # BLD AUTO: 2.54 M/UL (ref 4.2–5.9)
RBC MORPH BLD: NORMAL
SODIUM SERPL-SCNC: 133 MMOL/L (ref 136–145)
TOXIC GRANULES BLD QL SMEAR: PRESENT
WBC # BLD AUTO: 3.5 K/UL (ref 4–11)

## 2023-07-07 PROCEDURE — 80069 RENAL FUNCTION PANEL: CPT

## 2023-07-07 PROCEDURE — 2580000003 HC RX 258: Performed by: STUDENT IN AN ORGANIZED HEALTH CARE EDUCATION/TRAINING PROGRAM

## 2023-07-07 PROCEDURE — 6370000000 HC RX 637 (ALT 250 FOR IP): Performed by: STUDENT IN AN ORGANIZED HEALTH CARE EDUCATION/TRAINING PROGRAM

## 2023-07-07 PROCEDURE — 99239 HOSP IP/OBS DSCHRG MGMT >30: CPT | Performed by: HOSPITALIST

## 2023-07-07 PROCEDURE — 6360000002 HC RX W HCPCS: Performed by: NURSE PRACTITIONER

## 2023-07-07 PROCEDURE — 6360000002 HC RX W HCPCS

## 2023-07-07 PROCEDURE — 85025 COMPLETE CBC W/AUTO DIFF WBC: CPT

## 2023-07-07 PROCEDURE — 83735 ASSAY OF MAGNESIUM: CPT

## 2023-07-07 RX ORDER — DEXAMETHASONE 4 MG/1
TABLET ORAL
Qty: 20 TABLET | Refills: 0 | Status: SHIPPED | OUTPATIENT
Start: 2023-07-07

## 2023-07-07 RX ORDER — AMIODARONE HYDROCHLORIDE 200 MG/1
200 TABLET ORAL DAILY
Qty: 30 TABLET | Refills: 2 | Status: ON HOLD | OUTPATIENT
Start: 2023-07-16 | End: 2023-07-15 | Stop reason: HOSPADM

## 2023-07-07 RX ORDER — HEPARIN SODIUM 100 [USP'U]/ML
100 INJECTION, SOLUTION INTRAVENOUS PRN
Status: DISCONTINUED | OUTPATIENT
Start: 2023-07-07 | End: 2023-07-07 | Stop reason: HOSPADM

## 2023-07-07 RX ORDER — METOPROLOL SUCCINATE 50 MG/1
50 TABLET, EXTENDED RELEASE ORAL DAILY
Qty: 30 TABLET | Refills: 3 | Status: SHIPPED | OUTPATIENT
Start: 2023-07-08

## 2023-07-07 RX ORDER — AMIODARONE HYDROCHLORIDE 200 MG/1
200 TABLET ORAL 2 TIMES DAILY
Qty: 17 TABLET | Refills: 0 | Status: SHIPPED | OUTPATIENT
Start: 2023-07-07 | End: 2023-07-16

## 2023-07-07 RX ADMIN — DEXAMETHASONE 40 MG: 4 TABLET ORAL at 08:44

## 2023-07-07 RX ADMIN — PANTOPRAZOLE SODIUM 40 MG: 40 TABLET, DELAYED RELEASE ORAL at 08:42

## 2023-07-07 RX ADMIN — SODIUM CHLORIDE, PRESERVATIVE FREE 10 ML: 5 INJECTION INTRAVENOUS at 08:44

## 2023-07-07 RX ADMIN — Medication 100 UNITS: at 12:01

## 2023-07-07 RX ADMIN — MORPHINE SULFATE 15 MG: 15 TABLET, FILM COATED, EXTENDED RELEASE ORAL at 08:44

## 2023-07-07 RX ADMIN — METOPROLOL SUCCINATE 50 MG: 50 TABLET, EXTENDED RELEASE ORAL at 08:44

## 2023-07-07 RX ADMIN — AMIODARONE HYDROCHLORIDE 200 MG: 200 TABLET ORAL at 08:44

## 2023-07-07 ASSESSMENT — PAIN SCALES - GENERAL
PAINLEVEL_OUTOF10: 0
PAINLEVEL_OUTOF10: 0

## 2023-07-07 ASSESSMENT — PAIN SCALES - WONG BAKER
WONGBAKER_NUMERICALRESPONSE: 0
WONGBAKER_NUMERICALRESPONSE: 0

## 2023-07-07 NOTE — DISCHARGE INSTRUCTIONS
Please continue the steroid dexamethasone 40 mg daily for 2 days. Please take 2 of the 20 mg tablets daily for 2 days. Please stop taking your dexamethasone 4 mg daily dosage while you are taking the 40 mg daily for the next two days. Please resume your home dexamethasone 4 mg daily after 2 days. Please  continue amiodarone 200 mg twice a day until 7/16/23 and then take the amiodarone 200 mg daily. Please take weekly blood labs to monitor your blood and platelet counts. Please follow-up with cardiology in 1 month. Please follow-up with [facility] by calling to schedule an appointment. Please follow up with your primary care doctor in 1 week.

## 2023-07-07 NOTE — DISCHARGE SUMMARY
INTERNAL MEDICINE DEPARTMENT AT [address] [place] [address]  DISCHARGE SUMMARY    Patient ID: Brian Nick                                             Discharge Date: 7/7/2023   Patient's PCP: Mary Beckford MD                                          Discharge Physician: Nadia Sales MD MD  Admit Date: 6/29/2023   Admitting Physician: Shanna Yu MD    PROBLEMS DURING HOSPITALIZATION:  Present on Admission:   SVT (supraventricular tachycardia) ([address])   Prostate cancer metastatic to multiple sites 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 male with PMHx Relapsed/metastatic prostate cancer s/p recent chemotx. Presents from home after feeling lightheadedness after standing, this began Tuesday. Notably, 2d ago he also had a clinic visit where his HR was in the 60s, but was HDS. He endorses palpitations during these episodes of , but denies SOB, CP, dizziness, N/V. He also denies falling or other trauma. In the ED:  HR 180s, BP initially 90s and then 80s. EP consulted stat, determined rhythm to be Aflutter/RVR. Started on sepsis bolus given lactic acidemia, hypotension and immunosuppressed state (WBC 0.1). Also, started on Amio 150 bolus. BP improved, did not require vasopressors. Labs with hyponatremia 127 (recently wnl), Cr 1.4 (bl 0.9) WBC 0.1 (no diff), Hgb 8.4 (11.3 2wk ago). Plt 17k  LA 3.2, Trop 75. CXR not significant change from 2wks ago.       Additional Hx  Hx prostate cancer, dx 2014, tx
tablet  Commonly known as: OSCAL     HYDROcodone-acetaminophen 5-325 MG per tablet  Commonly known as: NORCO     latanoprost 0.005 % ophthalmic solution  Commonly known as: XALATAN     MS Contin 15 MG extended release tablet  Generic drug: morphine     omeprazole 20 MG delayed release capsule  Commonly known as: PRILOSEC     ondansetron 8 MG tablet  Commonly known as: ZOFRAN     prochlorperazine 10 MG tablet  Commonly known as: COMPAZINE     therapeutic multivitamin-minerals tablet     vitamin D 50 MCG (2000 UT) Caps capsule            STOP taking these medications      abiraterone acetate 250 MG tablet  Commonly known as: ZYTIGA               Where to Get Your Medications        These medications were sent to [facility] [id number] [facility], [place] - [address] - P [phone] - F [phone]  [address] [place] [address], [address],[address]      Phone: [phone]   amiodarone 200 MG tablet  amiodarone 200 MG tablet  Dexamethasone 20 MG Tabs  dexamethasone 4 MG tablet  metoprolol succinate 50 MG extended release tablet          Activity: activity as tolerated  Diet: regular diet  Wound Care: as directed    Time Spent on discharge is more than 30 minutes    Signed:  ***

## 2023-07-07 NOTE — CARE COORDINATION
ADIS submitted pre-cert for [facility], authorization request [id number]. Pre-cert is approved. This authorization is valid for admission 07/07/2023 through 07/09/2023. Pt can DC tomorrow to SNF.     Electronically signed by CONNIE Velásquez, TROY on 7/6/2023 at 4:48 PM  [phone]
CM continues to follow for DC planning and needs. Patient in agreement with rehab at [place]. Patient provided SNF list for review, interested in [facility] as it is closer to his home. Friend at bedside also mentioned ARU, per review of therapy notes, patient does not appear to be able to tolerate requirements of ARU currently, nursing in agreement. CM explained to patient currently not likely to be a good candidate for therapy due to limited endurance currently, patient would like to work with therapy again to see if he can do better. CM has provided SNF list and also sent referral to [facility] for review.      Thank you,  Wendy MACIASN, RN,   4th Floor Progressive Care Unit  [phone]
CM continues to follow for DCP. Plan to DC to [facility] [place]. Pt will need [employer] Medicare precert once close to DC.      Thank you  Cat Alexsandra Howard RN, BSN, [facility]   [phone]
AM-PAC Score: 17 /24      DISCHARGE Disposition: [address] (SNF): [address] Phone: [phone] Fax: [phone]    LOC at discharge: Skilled  KIM Completed: Yes    Notification completed in HENS/PAS?:  Yes : CM has completed HENS online through secure website for SNF admission at [facility]. Document ID #: [id number]    IMM Completed:   Yes, Case management has presented and reviewed IMM letter #2 to the patient and/or family/ POA. Patient and/or family/POA verbalized understanding of their medicare rights and appeal process if needed. Patient and/or family/POA has signed and placed today's date (7/6/2023) and time [phone]) on letter #2 on the the appropriate lines. Patient and/or family/POA, provided copy of signed letter and they are aware that this original copy of IMM letter #2 is available prior to discharge from the paper chart on the unit. Electronic documentation has been entered into epic for IMM letter #2 and original paper copy has been added to the paper chart at the nurses station.      IMM Letter given to Patient/Family/Significant other/Guardian/POA/by[de-identified] to patient by Romana Gandhi RN, BSN, CM - refused to sign  IMM Letter date given[de-identified] 07/06/23  IMM Letter time given[de-identified] 1720      Transportation:  Transportation PLAN for discharge: EMS transportation   Mode of Transport: Ambulance stretcher - BLS  Reason for medical transport: Severe muscular weakness and de-conditioned state due to CHF, hx of cancer and requires ambulance transport due to high fall risk and Unable to sit in a chair or wheelchair due to grade II or greater decubitus ulcers on the buttocks  Name of Transport Company: [employer]: [phone]  Time of Transport: 1230    Transport form completed: Yes    Home Care:  Home Care ordered at discharge: Not [address]: Not Applicable  Orders faxed: No    Durable Medical Equipment:  DME Provider:   Equipment obtained during

## 2023-07-11 ENCOUNTER — APPOINTMENT (OUTPATIENT)
Dept: CT IMAGING | Age: 73
DRG: 871 | End: 2023-07-11
Payer: MEDICARE

## 2023-07-11 ENCOUNTER — HOSPITAL ENCOUNTER (INPATIENT)
Age: 73
LOS: 4 days | Discharge: NURSING, REHAB OR LONG-TERM CARE | DRG: 871 | End: 2023-07-15
Attending: EMERGENCY MEDICINE | Admitting: HOSPITALIST
Payer: MEDICARE

## 2023-07-11 DIAGNOSIS — C61 PROSTATE CANCER METASTATIC TO MULTIPLE SITES (HCC): ICD-10-CM

## 2023-07-11 DIAGNOSIS — D61.818 PANCYTOPENIA (HCC): ICD-10-CM

## 2023-07-11 DIAGNOSIS — E87.1 HYPONATREMIA: ICD-10-CM

## 2023-07-11 DIAGNOSIS — Z79.899 PATIENT ON ANTINEOPLASTIC CHEMOTHERAPY REGIMEN: ICD-10-CM

## 2023-07-11 DIAGNOSIS — A41.9 SEPSIS WITH ACUTE ORGAN DYSFUNCTION WITHOUT SEPTIC SHOCK, DUE TO UNSPECIFIED ORGANISM, UNSPECIFIED TYPE (HCC): Primary | ICD-10-CM

## 2023-07-11 DIAGNOSIS — J18.9 MULTIFOCAL PNEUMONIA: ICD-10-CM

## 2023-07-11 DIAGNOSIS — R65.20 SEPSIS WITH ACUTE ORGAN DYSFUNCTION WITHOUT SEPTIC SHOCK, DUE TO UNSPECIFIED ORGANISM, UNSPECIFIED TYPE (HCC): Primary | ICD-10-CM

## 2023-07-11 DIAGNOSIS — E83.51 HYPOCALCEMIA: ICD-10-CM

## 2023-07-11 LAB
ABO + RH BLD: NORMAL
ALBUMIN SERPL-MCNC: 2.2 G/DL (ref 3.4–5)
ALBUMIN/GLOB SERPL: 0.7 {RATIO} (ref 1.1–2.2)
ALP SERPL-CCNC: 235 U/L (ref 40–129)
ALT SERPL-CCNC: 50 U/L (ref 10–40)
AMORPH SED URNS QL MICRO: ABNORMAL /HPF
ANION GAP SERPL CALCULATED.3IONS-SCNC: 10 MMOL/L (ref 3–16)
ANION GAP SERPL CALCULATED.3IONS-SCNC: 21 MMOL/L (ref 3–16)
ANISOCYTOSIS BLD QL SMEAR: ABNORMAL
AST SERPL-CCNC: 50 U/L (ref 15–37)
BACTERIA URNS QL MICRO: ABNORMAL /HPF
BASE EXCESS BLDV CALC-SCNC: -2.8 MMOL/L (ref -2–3)
BASOPHILS # BLD: 0 K/UL (ref 0–0.2)
BASOPHILS NFR BLD: 0 %
BILIRUB DIRECT SERPL-MCNC: <0.2 MG/DL (ref 0–0.3)
BILIRUB INDIRECT SERPL-MCNC: NORMAL MG/DL (ref 0–1)
BILIRUB SERPL-MCNC: 0.6 MG/DL (ref 0–1)
BILIRUB UR QL STRIP.AUTO: ABNORMAL
BLD GP AB SCN SERPL QL: NORMAL
BUN SERPL-MCNC: 30 MG/DL (ref 7–20)
BUN SERPL-MCNC: 31 MG/DL (ref 7–20)
CALCIUM SERPL-MCNC: 7 MG/DL (ref 8.3–10.6)
CALCIUM SERPL-MCNC: 7.4 MG/DL (ref 8.3–10.6)
CHLORIDE SERPL-SCNC: 95 MMOL/L (ref 99–110)
CHLORIDE SERPL-SCNC: 99 MMOL/L (ref 99–110)
CLARITY UR: CLEAR
CO2 BLDV-SCNC: 23 MMOL/L
CO2 SERPL-SCNC: 12 MMOL/L (ref 21–32)
CO2 SERPL-SCNC: 22 MMOL/L (ref 21–32)
COHGB MFR BLDV: 1.7 % (ref 0–1.5)
COLOR UR: YELLOW
CREAT SERPL-MCNC: 1 MG/DL (ref 0.8–1.3)
CREAT SERPL-MCNC: 1.1 MG/DL (ref 0.8–1.3)
DEPRECATED RDW RBC AUTO: 18.6 % (ref 12.4–15.4)
DO-HGB MFR BLDV: 57.5 %
EOSINOPHIL # BLD: 0 K/UL (ref 0–0.6)
EOSINOPHIL NFR BLD: 0 %
FINE GRAN CASTS #/AREA URNS HPF: ABNORMAL /LPF (ref 0–2)
GFR SERPLBLD CREATININE-BSD FMLA CKD-EPI: >60 ML/MIN/{1.73_M2}
GFR SERPLBLD CREATININE-BSD FMLA CKD-EPI: >60 ML/MIN/{1.73_M2}
GLUCOSE SERPL-MCNC: 140 MG/DL (ref 70–99)
GLUCOSE SERPL-MCNC: 216 MG/DL (ref 70–99)
GLUCOSE UR STRIP.AUTO-MCNC: NEGATIVE MG/DL
HCO3 BLDV-SCNC: 21.8 MMOL/L (ref 24–28)
HCT VFR BLD AUTO: 22.6 % (ref 40.5–52.5)
HGB BLD-MCNC: 7.4 G/DL (ref 13.5–17.5)
HGB UR QL STRIP.AUTO: ABNORMAL
HYALINE CASTS #/AREA URNS LPF: ABNORMAL /LPF (ref 0–2)
KETONES UR STRIP.AUTO-MCNC: NEGATIVE MG/DL
LACTATE BLDV-SCNC: 5.2 MMOL/L (ref 0.4–1.9)
LACTATE BLDV-SCNC: 5.7 MMOL/L (ref 0.4–1.9)
LEUKOCYTE ESTERASE UR QL STRIP.AUTO: NEGATIVE
LIPASE SERPL-CCNC: 30 U/L (ref 13–60)
LYMPHOCYTES # BLD: 0 K/UL (ref 1–5.1)
LYMPHOCYTES NFR BLD: 1 %
MACROCYTES BLD QL SMEAR: ABNORMAL
MCH RBC QN AUTO: 30.7 PG (ref 26–34)
MCHC RBC AUTO-ENTMCNC: 32.8 G/DL (ref 31–36)
MCV RBC AUTO: 93.6 FL (ref 80–100)
METHGB MFR BLDV: 1.5 % (ref 0–1.5)
MICROCYTES BLD QL SMEAR: ABNORMAL
MONOCYTES # BLD: 0.1 K/UL (ref 0–1.3)
MONOCYTES NFR BLD: 3 %
MUCOUS THREADS #/AREA URNS LPF: ABNORMAL /LPF
MYELOCYTES NFR BLD MANUAL: 2 %
NEUTROPHILS # BLD: 3.7 K/UL (ref 1.7–7.7)
NEUTROPHILS NFR BLD: 86 %
NEUTS BAND NFR BLD MANUAL: 8 % (ref 0–7)
NITRITE UR QL STRIP.AUTO: NEGATIVE
NRBC BLD-RTO: 3 /100 WBC
OVALOCYTES BLD QL SMEAR: ABNORMAL
PCO2 BLDV: 34.7 MMHG (ref 41–51)
PH BLDV: 7.41 [PH] (ref 7.35–7.45)
PH UR STRIP.AUTO: 6 [PH] (ref 5–8)
PLATELET # BLD AUTO: 27 K/UL (ref 135–450)
PLATELET BLD QL SMEAR: ABNORMAL
PMV BLD AUTO: 10.6 FL (ref 5–10.5)
PO2 BLDV: <30 MMHG (ref 25–40)
POLYCHROMASIA BLD QL SMEAR: ABNORMAL
POTASSIUM SERPL-SCNC: 4.7 MMOL/L (ref 3.5–5.1)
POTASSIUM SERPL-SCNC: 4.9 MMOL/L (ref 3.5–5.1)
PROT SERPL-MCNC: 5.2 G/DL (ref 6.4–8.2)
PROT UR STRIP.AUTO-MCNC: 100 MG/DL
RBC # BLD AUTO: 2.42 M/UL (ref 4.2–5.9)
RBC #/AREA URNS HPF: ABNORMAL /HPF (ref 0–4)
SAO2 % BLDV: 41 %
SLIDE REVIEW: ABNORMAL
SODIUM SERPL-SCNC: 128 MMOL/L (ref 136–145)
SODIUM SERPL-SCNC: 131 MMOL/L (ref 136–145)
SP GR UR STRIP.AUTO: 1.02 (ref 1–1.03)
UA DIPSTICK W REFLEX MICRO PNL UR: YES
URN SPEC COLLECT METH UR: ABNORMAL
UROBILINOGEN UR STRIP-ACNC: 2 E.U./DL
WBC # BLD AUTO: 3.9 K/UL (ref 4–11)
WBC #/AREA URNS HPF: ABNORMAL /HPF (ref 0–5)

## 2023-07-11 PROCEDURE — 2060000000 HC ICU INTERMEDIATE R&B

## 2023-07-11 PROCEDURE — 6360000004 HC RX CONTRAST MEDICATION: Performed by: STUDENT IN AN ORGANIZED HEALTH CARE EDUCATION/TRAINING PROGRAM

## 2023-07-11 PROCEDURE — 86901 BLOOD TYPING SEROLOGIC RH(D): CPT

## 2023-07-11 PROCEDURE — 93005 ELECTROCARDIOGRAM TRACING: CPT | Performed by: STUDENT IN AN ORGANIZED HEALTH CARE EDUCATION/TRAINING PROGRAM

## 2023-07-11 PROCEDURE — 85025 COMPLETE CBC W/AUTO DIFF WBC: CPT

## 2023-07-11 PROCEDURE — 36415 COLL VENOUS BLD VENIPUNCTURE: CPT

## 2023-07-11 PROCEDURE — 86900 BLOOD TYPING SEROLOGIC ABO: CPT

## 2023-07-11 PROCEDURE — 96367 TX/PROPH/DG ADDL SEQ IV INF: CPT

## 2023-07-11 PROCEDURE — 74177 CT ABD & PELVIS W/CONTRAST: CPT

## 2023-07-11 PROCEDURE — 86923 COMPATIBILITY TEST ELECTRIC: CPT

## 2023-07-11 PROCEDURE — 80053 COMPREHEN METABOLIC PANEL: CPT

## 2023-07-11 PROCEDURE — 83605 ASSAY OF LACTIC ACID: CPT

## 2023-07-11 PROCEDURE — 87040 BLOOD CULTURE FOR BACTERIA: CPT

## 2023-07-11 PROCEDURE — 82803 BLOOD GASES ANY COMBINATION: CPT

## 2023-07-11 PROCEDURE — 99285 EMERGENCY DEPT VISIT HI MDM: CPT

## 2023-07-11 PROCEDURE — P9016 RBC LEUKOCYTES REDUCED: HCPCS

## 2023-07-11 PROCEDURE — 71260 CT THORAX DX C+: CPT

## 2023-07-11 PROCEDURE — 83690 ASSAY OF LIPASE: CPT

## 2023-07-11 PROCEDURE — 81001 URINALYSIS AUTO W/SCOPE: CPT

## 2023-07-11 PROCEDURE — 2580000003 HC RX 258: Performed by: STUDENT IN AN ORGANIZED HEALTH CARE EDUCATION/TRAINING PROGRAM

## 2023-07-11 PROCEDURE — 6360000002 HC RX W HCPCS: Performed by: STUDENT IN AN ORGANIZED HEALTH CARE EDUCATION/TRAINING PROGRAM

## 2023-07-11 PROCEDURE — 96365 THER/PROPH/DIAG IV INF INIT: CPT

## 2023-07-11 PROCEDURE — 86850 RBC ANTIBODY SCREEN: CPT

## 2023-07-11 RX ORDER — SODIUM CHLORIDE, SODIUM LACTATE, POTASSIUM CHLORIDE, AND CALCIUM CHLORIDE .6; .31; .03; .02 G/100ML; G/100ML; G/100ML; G/100ML
1000 INJECTION, SOLUTION INTRAVENOUS ONCE
Status: COMPLETED | OUTPATIENT
Start: 2023-07-11 | End: 2023-07-11

## 2023-07-11 RX ORDER — SODIUM CHLORIDE, SODIUM LACTATE, POTASSIUM CHLORIDE, AND CALCIUM CHLORIDE .6; .31; .03; .02 G/100ML; G/100ML; G/100ML; G/100ML
800 INJECTION, SOLUTION INTRAVENOUS ONCE
Status: COMPLETED | OUTPATIENT
Start: 2023-07-11 | End: 2023-07-11

## 2023-07-11 RX ADMIN — VANCOMYCIN HYDROCHLORIDE 1250 MG: 10 INJECTION, POWDER, LYOPHILIZED, FOR SOLUTION INTRAVENOUS at 20:52

## 2023-07-11 RX ADMIN — CEFTRIAXONE 1000 MG: 1 INJECTION, POWDER, FOR SOLUTION INTRAMUSCULAR; INTRAVENOUS at 19:51

## 2023-07-11 RX ADMIN — SODIUM CHLORIDE, POTASSIUM CHLORIDE, SODIUM LACTATE AND CALCIUM CHLORIDE 1000 ML: 600; 310; 30; 20 INJECTION, SOLUTION INTRAVENOUS at 17:43

## 2023-07-11 RX ADMIN — IOPAMIDOL 75 ML: 755 INJECTION, SOLUTION INTRAVENOUS at 19:35

## 2023-07-11 RX ADMIN — SODIUM CHLORIDE, POTASSIUM CHLORIDE, SODIUM LACTATE AND CALCIUM CHLORIDE 800 ML: 600; 310; 30; 20 INJECTION, SOLUTION INTRAVENOUS at 20:10

## 2023-07-11 ASSESSMENT — LIFESTYLE VARIABLES
HOW OFTEN DO YOU HAVE A DRINK CONTAINING ALCOHOL: NEVER
HOW MANY STANDARD DRINKS CONTAINING ALCOHOL DO YOU HAVE ON A TYPICAL DAY: PATIENT DOES NOT DRINK

## 2023-07-11 ASSESSMENT — PAIN - FUNCTIONAL ASSESSMENT
PAIN_FUNCTIONAL_ASSESSMENT: NONE - DENIES PAIN
PAIN_FUNCTIONAL_ASSESSMENT: NONE - DENIES PAIN

## 2023-07-12 PROBLEM — E46 MALNUTRITION (HCC): Chronic | Status: ACTIVE | Noted: 2023-07-12

## 2023-07-12 LAB
ANION GAP SERPL CALCULATED.3IONS-SCNC: 9 MMOL/L (ref 3–16)
BASOPHILS # BLD: 0 K/UL (ref 0–0.2)
BASOPHILS NFR BLD: 0.4 %
BLOOD BANK DISPENSE STATUS: NORMAL
BLOOD BANK PRODUCT CODE: NORMAL
BPU ID: NORMAL
BUN SERPL-MCNC: 30 MG/DL (ref 7–20)
CA-I BLD-SCNC: 1 MMOL/L (ref 1.12–1.32)
CALCIUM SERPL-MCNC: 7.4 MG/DL (ref 8.3–10.6)
CHLORIDE SERPL-SCNC: 101 MMOL/L (ref 99–110)
CO2 SERPL-SCNC: 23 MMOL/L (ref 21–32)
CREAT SERPL-MCNC: 0.8 MG/DL (ref 0.8–1.3)
DEPRECATED RDW RBC AUTO: 18.2 % (ref 12.4–15.4)
DESCRIPTION BLOOD BANK: NORMAL
EKG ATRIAL RATE: 352 BPM
EKG DIAGNOSIS: NORMAL
EKG Q-T INTERVAL: 340 MS
EKG QRS DURATION: 86 MS
EKG QTC CALCULATION (BAZETT): 451 MS
EKG R AXIS: 10 DEGREES
EKG T AXIS: 48 DEGREES
EKG VENTRICULAR RATE: 106 BPM
EOSINOPHIL # BLD: 0 K/UL (ref 0–0.6)
EOSINOPHIL NFR BLD: 0.3 %
GFR SERPLBLD CREATININE-BSD FMLA CKD-EPI: >60 ML/MIN/{1.73_M2}
GLUCOSE SERPL-MCNC: 131 MG/DL (ref 70–99)
HCT VFR BLD AUTO: 17.6 % (ref 40.5–52.5)
HCT VFR BLD AUTO: 25.9 % (ref 40.5–52.5)
HGB BLD-MCNC: 6 G/DL (ref 13.5–17.5)
HGB BLD-MCNC: 8.7 G/DL (ref 13.5–17.5)
LACTATE BLDV-SCNC: 1 MMOL/L (ref 0.4–2)
LACTATE BLDV-SCNC: 1.2 MMOL/L (ref 0.4–2)
LYMPHOCYTES # BLD: 0 K/UL (ref 1–5.1)
LYMPHOCYTES NFR BLD: 2 %
MCH RBC QN AUTO: 31.3 PG (ref 26–34)
MCHC RBC AUTO-ENTMCNC: 34.2 G/DL (ref 31–36)
MCV RBC AUTO: 91.4 FL (ref 80–100)
MONOCYTES # BLD: 0.1 K/UL (ref 0–1.3)
MONOCYTES NFR BLD: 5.1 %
NEUTROPHILS # BLD: 1.5 K/UL (ref 1.7–7.7)
NEUTROPHILS NFR BLD: 92.2 %
PH VENOUS: 7.46 (ref 7.35–7.45)
PLATELET # BLD AUTO: 19 K/UL (ref 135–450)
PMV BLD AUTO: 9.1 FL (ref 5–10.5)
POTASSIUM SERPL-SCNC: 4.4 MMOL/L (ref 3.5–5.1)
RBC # BLD AUTO: 1.93 M/UL (ref 4.2–5.9)
SODIUM SERPL-SCNC: 133 MMOL/L (ref 136–145)
TSH SERPL DL<=0.005 MIU/L-ACNC: 1.32 UIU/ML (ref 0.27–4.2)
WBC # BLD AUTO: 1.7 K/UL (ref 4–11)

## 2023-07-12 PROCEDURE — 93005 ELECTROCARDIOGRAM TRACING: CPT

## 2023-07-12 PROCEDURE — 87070 CULTURE OTHR SPECIMN AEROBIC: CPT

## 2023-07-12 PROCEDURE — 85018 HEMOGLOBIN: CPT

## 2023-07-12 PROCEDURE — 2060000000 HC ICU INTERMEDIATE R&B

## 2023-07-12 PROCEDURE — 99222 1ST HOSP IP/OBS MODERATE 55: CPT | Performed by: INTERNAL MEDICINE

## 2023-07-12 PROCEDURE — 2580000003 HC RX 258

## 2023-07-12 PROCEDURE — 6370000000 HC RX 637 (ALT 250 FOR IP)

## 2023-07-12 PROCEDURE — 6360000002 HC RX W HCPCS

## 2023-07-12 PROCEDURE — 84443 ASSAY THYROID STIM HORMONE: CPT

## 2023-07-12 PROCEDURE — 36430 TRANSFUSION BLD/BLD COMPNT: CPT

## 2023-07-12 PROCEDURE — 85025 COMPLETE CBC W/AUTO DIFF WBC: CPT

## 2023-07-12 PROCEDURE — 83605 ASSAY OF LACTIC ACID: CPT

## 2023-07-12 PROCEDURE — 87205 SMEAR GRAM STAIN: CPT

## 2023-07-12 PROCEDURE — 86403 PARTICLE AGGLUT ANTBDY SCRN: CPT

## 2023-07-12 PROCEDURE — 93010 ELECTROCARDIOGRAM REPORT: CPT | Performed by: INTERNAL MEDICINE

## 2023-07-12 PROCEDURE — 80048 BASIC METABOLIC PNL TOTAL CA: CPT

## 2023-07-12 PROCEDURE — 87081 CULTURE SCREEN ONLY: CPT

## 2023-07-12 PROCEDURE — 36415 COLL VENOUS BLD VENIPUNCTURE: CPT

## 2023-07-12 PROCEDURE — 87449 NOS EACH ORGANISM AG IA: CPT

## 2023-07-12 PROCEDURE — 85014 HEMATOCRIT: CPT

## 2023-07-12 RX ORDER — ENOXAPARIN SODIUM 100 MG/ML
40 INJECTION SUBCUTANEOUS DAILY
Status: DISCONTINUED | OUTPATIENT
Start: 2023-07-12 | End: 2023-07-15 | Stop reason: HOSPADM

## 2023-07-12 RX ORDER — SODIUM CHLORIDE 9 MG/ML
INJECTION, SOLUTION INTRAVENOUS PRN
Status: DISCONTINUED | OUTPATIENT
Start: 2023-07-11 | End: 2023-07-15 | Stop reason: HOSPADM

## 2023-07-12 RX ORDER — DEXAMETHASONE 4 MG/1
4 TABLET ORAL DAILY
Status: DISCONTINUED | OUTPATIENT
Start: 2023-07-12 | End: 2023-07-12

## 2023-07-12 RX ORDER — ONDANSETRON 2 MG/ML
4 INJECTION INTRAMUSCULAR; INTRAVENOUS EVERY 6 HOURS PRN
Status: DISCONTINUED | OUTPATIENT
Start: 2023-07-11 | End: 2023-07-15 | Stop reason: HOSPADM

## 2023-07-12 RX ORDER — PANTOPRAZOLE SODIUM 40 MG/1
40 TABLET, DELAYED RELEASE ORAL
Status: DISCONTINUED | OUTPATIENT
Start: 2023-07-12 | End: 2023-07-15 | Stop reason: HOSPADM

## 2023-07-12 RX ORDER — SODIUM CHLORIDE 0.9 % (FLUSH) 0.9 %
5-40 SYRINGE (ML) INJECTION PRN
Status: DISCONTINUED | OUTPATIENT
Start: 2023-07-11 | End: 2023-07-15 | Stop reason: HOSPADM

## 2023-07-12 RX ORDER — ACETAMINOPHEN 650 MG/1
650 SUPPOSITORY RECTAL EVERY 6 HOURS PRN
Status: DISCONTINUED | OUTPATIENT
Start: 2023-07-11 | End: 2023-07-15 | Stop reason: HOSPADM

## 2023-07-12 RX ORDER — METOPROLOL SUCCINATE 50 MG/1
50 TABLET, EXTENDED RELEASE ORAL DAILY
Status: DISCONTINUED | OUTPATIENT
Start: 2023-07-12 | End: 2023-07-15 | Stop reason: HOSPADM

## 2023-07-12 RX ORDER — SODIUM CHLORIDE 9 MG/ML
INJECTION, SOLUTION INTRAVENOUS PRN
Status: DISCONTINUED | OUTPATIENT
Start: 2023-07-12 | End: 2023-07-12

## 2023-07-12 RX ORDER — POLYETHYLENE GLYCOL 3350 17 G/17G
17 POWDER, FOR SOLUTION ORAL DAILY PRN
Status: DISCONTINUED | OUTPATIENT
Start: 2023-07-11 | End: 2023-07-15 | Stop reason: HOSPADM

## 2023-07-12 RX ORDER — AMIODARONE HYDROCHLORIDE 200 MG/1
200 TABLET ORAL 2 TIMES DAILY
Status: DISCONTINUED | OUTPATIENT
Start: 2023-07-12 | End: 2023-07-12

## 2023-07-12 RX ORDER — ACETAMINOPHEN 325 MG/1
650 TABLET ORAL EVERY 6 HOURS PRN
Status: DISCONTINUED | OUTPATIENT
Start: 2023-07-11 | End: 2023-07-15 | Stop reason: HOSPADM

## 2023-07-12 RX ORDER — SODIUM CHLORIDE 9 MG/ML
INJECTION, SOLUTION INTRAVENOUS PRN
Status: DISCONTINUED | OUTPATIENT
Start: 2023-07-12 | End: 2023-07-15 | Stop reason: HOSPADM

## 2023-07-12 RX ORDER — SENNA PLUS 8.6 MG/1
1 TABLET ORAL DAILY
Status: DISCONTINUED | OUTPATIENT
Start: 2023-07-12 | End: 2023-07-15 | Stop reason: HOSPADM

## 2023-07-12 RX ORDER — CALCIUM CARBONATE 500(1250)
500 TABLET ORAL DAILY
Status: DISCONTINUED | OUTPATIENT
Start: 2023-07-12 | End: 2023-07-15 | Stop reason: HOSPADM

## 2023-07-12 RX ORDER — SODIUM CHLORIDE 9 MG/ML
INJECTION, SOLUTION INTRAVENOUS CONTINUOUS
Status: ACTIVE | OUTPATIENT
Start: 2023-07-12 | End: 2023-07-12

## 2023-07-12 RX ORDER — AMIODARONE HYDROCHLORIDE 200 MG/1
200 TABLET ORAL DAILY
Status: DISCONTINUED | OUTPATIENT
Start: 2023-07-12 | End: 2023-07-15 | Stop reason: HOSPADM

## 2023-07-12 RX ORDER — SODIUM CHLORIDE 0.9 % (FLUSH) 0.9 %
5-40 SYRINGE (ML) INJECTION EVERY 12 HOURS SCHEDULED
Status: DISCONTINUED | OUTPATIENT
Start: 2023-07-12 | End: 2023-07-15 | Stop reason: HOSPADM

## 2023-07-12 RX ORDER — DEXAMETHASONE 4 MG/1
4 TABLET ORAL EVERY 12 HOURS SCHEDULED
Status: DISCONTINUED | OUTPATIENT
Start: 2023-07-12 | End: 2023-07-15 | Stop reason: HOSPADM

## 2023-07-12 RX ORDER — VITAMIN B COMPLEX
2 TABLET ORAL DAILY
Status: DISCONTINUED | OUTPATIENT
Start: 2023-07-12 | End: 2023-07-15 | Stop reason: HOSPADM

## 2023-07-12 RX ORDER — ONDANSETRON 4 MG/1
4 TABLET, ORALLY DISINTEGRATING ORAL EVERY 8 HOURS PRN
Status: DISCONTINUED | OUTPATIENT
Start: 2023-07-11 | End: 2023-07-15 | Stop reason: HOSPADM

## 2023-07-12 RX ORDER — LATANOPROST 50 UG/ML
1 SOLUTION/ DROPS OPHTHALMIC NIGHTLY
Status: DISCONTINUED | OUTPATIENT
Start: 2023-07-12 | End: 2023-07-15 | Stop reason: HOSPADM

## 2023-07-12 RX ADMIN — AMIODARONE HYDROCHLORIDE 200 MG: 200 TABLET ORAL at 09:59

## 2023-07-12 RX ADMIN — VANCOMYCIN HYDROCHLORIDE 1500 MG: 10 INJECTION, POWDER, LYOPHILIZED, FOR SOLUTION INTRAVENOUS at 07:40

## 2023-07-12 RX ADMIN — SODIUM CHLORIDE, PRESERVATIVE FREE 10 ML: 5 INJECTION INTRAVENOUS at 10:00

## 2023-07-12 RX ADMIN — PANTOPRAZOLE SODIUM 40 MG: 40 TABLET, DELAYED RELEASE ORAL at 10:00

## 2023-07-12 RX ADMIN — DEXAMETHASONE 4 MG: 4 TABLET ORAL at 21:06

## 2023-07-12 RX ADMIN — DEXAMETHASONE 4 MG: 4 TABLET ORAL at 09:59

## 2023-07-12 RX ADMIN — LATANOPROST 1 DROP: 50 SOLUTION OPHTHALMIC at 21:07

## 2023-07-12 RX ADMIN — SODIUM CHLORIDE: 9 INJECTION, SOLUTION INTRAVENOUS at 01:01

## 2023-07-12 RX ADMIN — SENNOSIDES 8.6 MG: 8.6 TABLET, FILM COATED ORAL at 09:59

## 2023-07-12 RX ADMIN — PIPERACILLIN AND TAZOBACTAM 4500 MG: 4; .5 INJECTION, POWDER, LYOPHILIZED, FOR SOLUTION INTRAVENOUS at 17:34

## 2023-07-12 RX ADMIN — PIPERACILLIN AND TAZOBACTAM 4500 MG: 4; .5 INJECTION, POWDER, LYOPHILIZED, FOR SOLUTION INTRAVENOUS at 11:44

## 2023-07-12 RX ADMIN — SODIUM CHLORIDE 25 ML: 9 INJECTION, SOLUTION INTRAVENOUS at 07:39

## 2023-07-12 RX ADMIN — CALCIUM 500 MG: 500 TABLET ORAL at 09:59

## 2023-07-12 RX ADMIN — Medication 2000 UNITS: at 09:59

## 2023-07-12 ASSESSMENT — PAIN SCALES - GENERAL
PAINLEVEL_OUTOF10: 0

## 2023-07-12 ASSESSMENT — ENCOUNTER SYMPTOMS
DIARRHEA: 0
COUGH: 0
ABDOMINAL DISTENTION: 0
SHORTNESS OF BREATH: 1
CHEST TIGHTNESS: 0
CONSTIPATION: 0
CHOKING: 0
BLOOD IN STOOL: 0
VOMITING: 0
ABDOMINAL PAIN: 0
NAUSEA: 0
EYES NEGATIVE: 1

## 2023-07-12 NOTE — PLAN OF CARE
Problem: Discharge Planning  Goal: Discharge to home or other facility with appropriate resources  7/12/2023 0601 by Corrie Reynolds RN  Outcome: Progressing  Flowsheets (Taken 7/12/2023 0601)  Discharge to home or other facility with appropriate resources:   Identify barriers to discharge with patient and caregiver   Arrange for needed discharge resources and transportation as appropriate   Identify discharge learning needs (meds, wound care, etc)     Problem: Safety - Adult  Goal: Free from fall injury  Outcome: Progressing  Flowsheets (Taken 7/12/2023 0601)  Free From Fall Injury: Instruct family/caregiver on patient safety

## 2023-07-12 NOTE — ED NOTES
ED TO INPATIENT SBAR HANDOFF    Patient Name: Pascual Hauser   :  1950  74 y.o. MRN:  [id number]  Preferred Name  Alana Pastrana  ED Room #:  F13/Q54-40  Family/Caregiver Present no   Restraints no   Sitter no   Sepsis Risk Score Sepsis Risk Score: 7.08    Situation  Code Status: Prior No additional code details. Allergies: Patient has no known allergies. Weight: Patient Vitals for the past 96 hrs (Last 3 readings):   Weight   23 1746 133 lb 11.2 oz (60.6 kg)     Arrived from: nursing home  Chief Complaint:   Chief Complaint   Patient presents with    Abnormal Lab     Pt has Hemoglobin of 6.8 and c/o shortness of breath on exertion. Hospital Problem/Diagnosis:  Principal Problem:    Sepsis ([address])  Resolved Problems:    * No resolved hospital problems. *    Imaging:   CT ABDOMEN PELVIS W IV CONTRAST Additional Contrast? None   Final Result      CHEST:   No evidence of pulmonary embolism. Dense consolidation in the left lower lobe as well as minimal patchy   consolidation in the bilateral upper and right middle lobes. This is nonspecific   but concerning for pneumonia. Recommend follow-up to resolution. Widespread osseous metastatic disease. Small to moderate left and trace right pleural effusions with associated passive   atelectasis. ABDOMEN AND PELVIS:   Questionable wall thickening and mucosal hyperenhancement involving the sigmoid   colon and rectum, which could represent a nonspecific proctocolitis. Increased mild ascites. Decreased size of hepatic metastases. Widespread osseous metastatic disease. Progressive pathologic compression   fracture of L2 with 6 mm retropulsion causing moderate spinal canal compromise. Note that evaluation for any areas of potential spinal canal compromise are   limited by CT. CT CHEST PULMONARY EMBOLISM W CONTRAST   Final Result      CHEST:   No evidence of pulmonary embolism.       Dense consolidation in the left lower

## 2023-07-12 NOTE — CONSENT
Informed Consent for Blood Component Transfusion Note    I have discussed with the patient the rationale for blood component transfusion; its benefits in treating or preventing fatigue, organ damage, or death; and its risk which includes mild transfusion reactions, rare risk of blood borne infection, or more serious but rare reactions. I have discussed the alternatives to transfusion, including the risk and consequences of not receiving transfusion. The patient had an opportunity to ask questions and had agreed to proceed with transfusion of blood components. Of note, blood consent was obtained by the resident night team prior to pRBC administration prior to sign-out.  Blood consent was also confirmed and obtained by resident day team.     Electronically signed by Al Bee MD on 7/12/23 at 11:40 AM EDT    Al Bee MD, PGY-2  Internal Medicine

## 2023-07-12 NOTE — PLAN OF CARE
Problem: Nutrition Deficit:  Goal: Optimize nutritional status  Outcome: Progressing  Flowsheets (Taken 7/12/2023 1810)  Nutrient intake appropriate for improving, restoring, or maintaining nutritional needs:   Monitor oral intake, labs, and treatment plans   Assess nutritional status and recommend course of action  Note: Appetite poor to fair. Will continue to encourage oral intake.      Problem: Safety - Adult  Goal: Free from fall injury  7/12/2023 1810 by Natalie Alvarenga RN  Outcome: Progressing  Flowsheets (Taken 7/12/2023 1810)  Free From Fall Injury:   Instruct family/caregiver on patient safety   Based on caregiver fall risk screen, instruct family/caregiver to ask for assistance with transferring infant if caregiver noted to have fall risk factors

## 2023-07-12 NOTE — H&P
pneumonia  Patient afebrile, hypotensive in the late 80s, tachycardic in the late 90s, lactic acid elevated at 5.7. Multiple prior admissions  Vancomycin and ceftriaxone ordered to treat pneumonia  Pharmacy consulted for vancomycin dosage  Viral panel, urine Legionella, strep ordered to determine cause of pneumonia    Anemia  Patient presented to ED as routine labs facility revealed hemoglobin of 7.4  Patient had his last chemo 2 weeks ago, pancytopenia. Patient's hemoglobin is low at baseline (around 8)    Pancytopenia  Patient had his last chemo 2 weeks ago, pancytopenia. WBC - 3.9    DVT PPx:  Diet: ADULT DIET;  Regular   Code status:  Full Code     Will discuss with attending physician MD Aly Adam MD  Internal Medicine, PGY-1

## 2023-07-12 NOTE — ED PROVIDER NOTES
[facility] ENCOUNTER          PHYSICIAN ASSISTANT NOTE     Date of evaluation: 7/11/2023    ADDENDUM:      Care of this patient was assumed from Lewis Basurto MD.  The patient was seen for Abnormal Lab (Pt has Hemoglobin of 6.8 and c/o shortness of breath on exertion.)  . The patient's initial evaluation and plan have been discussed with the prior provider who initially evaluated the patient. Nursing Notes, Past Medical Hx, Past Surgical Hx, Social Hx, Allergies, and Family Hx were all reviewed. Diagnostic Results     EKG   Normal sinus rhythm at a rate of 89 with no acute ischemic change or ST elevation. Normal axis. Parable 142 ms, QRS duration 84 ms, QT interval 362 ms, QTc 440 ms. New T wave inversion in V2 compared to prior on July 1, 2023, otherwise no acute changes. Labs included a CBC with a white blood cell count of 3.9, H&H is 7.4 and 22.6, platelets 34,725, all stable compared to labs from 4 days ago at discharge. BMP results a sodium of 128 that corrects to 131 given his glucose of 216. Chloride 95, CO2 12, BUN 31, creatinine 1.1, slightly up from 29 and 0.8 four days ago with an anion gap of 21. Calcium 7.4. Initial lactate is 5.7, down to 5.2 after fluid administration. Liver panel results an albumin of 2.2, alk phos 235, ALT 50, AST 50, slightly up from his liver panel from 5 days ago. Urinalysis results small bilirubin, trace blood, 100 protein, 2.0 urobilinogen with no evidence of infection. CTPA shows no evidence of PE but dense consolidation in the left lower lobe as well as minimal patchy consolidation in bilateral upper and right middle lobes, nonspecific but concerning for pneumonia. Also widespread osseous metastatic disease and small to moderate left and trace right pleural effusions with associated passive atelectasis.   CT of the abdomen and pelvis shows questionable wall thickening and mucosal hyperenhancement involving the sigmoid
[facility]          EM RESIDENT NOTE       Date of evaluation: 7/11/2023    Chief Complaint     Abnormal Lab (Pt has Hemoglobin of 6.8 and c/o shortness of breath on exertion.)    History of Present Illness     Susan Christensen is a 74 y.o. male who presents with shortness of breath for about a week. Patient is experiencing worsening shortness of breath, for about a week at home. He lives alone and has had difficulty completing ADLs for getting around his home secondary to shortness of breath and fatigue. Cycle of chemo 2 weeks ago and has ongoing pancytopenia. Has any chest pain, lightheadedness. No fevers or chills. He has had some abdominal pain for the last couple days. Denies any nausea or vomiting. He is having loose stools, not watery diarrhea, no melena or hematochezia. Review of Systems     Review of Systems  Pertinent positives and negatives reviewed in the HPI. A complete review of was otherwise obtained is negative except as noted above. Past Medical, Surgical, Family, and Social History     He has a past medical history of Cancer ([address]), CHF (congestive heart failure) ([address]), Dupuytrens contracture, Foot pain, left, GERD (gastroesophageal reflux disease), Migraine, Mole (skin), Nocturia, Reflux esophagitis, Shoulder pain, and Sinusitis. He has a past surgical history that includes eye surgery (1999); Finger trigger release (Left, 08/13/2013); Prostate surgery (10/2013); Colonoscopy (2000 & 2013); Colonoscopy (02/08/2018); Endoscopy, colon, diagnostic; Tonsillectomy; CT NEEDLE BIOPSY LIVER PERCUTANEOUS (6/2/2023); and Port Surgery (Right, 6/14/2023). His family history includes Breast Cancer in his maternal grandmother; Breast Cancer (age of onset: 44) in his mother; Cancer in his brother; Heart Attack in his father; Prostate Cancer in his brother; Stroke in his brother. He reports that he quit smoking about 43 years ago.  His smoking use
[address] [place] [address]

## 2023-07-12 NOTE — CONSULTS
[facility]    Consult Note      Requesting Physician:  Yimi Morgan MD     CHIEF COMPLAINT:  Abnormal Lab (Pt has Hemoglobin of 6.8 and c/o shortness of breath on exertion.)      HISTORY OF PRESENT ILLNESS:    Mr. Malryn Mendes  is a 74 y.o. male we are seeing in consultation for Prostate Cancer. He is followed by Dr. Vladimir Akers at [facility] office. Admitted 6/29-7/7/23 due to Afib with RVR. He is known to our service as we followed him during that admission for pancytopenia. Started on dexamethasone 40 mg p.o. daily for 4 days for presumed ITP. He was due to see Dr. Teresa Thompson on Monday for follow up and C2. Instead, we received a call from his skilled facility to report worsening anemia and associated SOB. He was instructed to go to the ED for evaluation. In the ED, patient was hypotensive in the late 80s, tachycardic in the late 90s to 100. Hemoglobin was 7.4 liver enzymes were elevated in the 50s. Lactic acid was elevated at 5.7. Cultures were drawn urinalysis was normal.  CT chest revealed no evidence of pulmonary embolism but did show dense consolidation in the left lower lobe concerning for pneumonia. CT abdomen pelvis revealed some acetic fluid, decreased size of hepatic metastasis. Today he complains of shortness of breath as his biggest complaint. He states this is severely limiting him in his ADLs.           Past Medical History:  Past Medical History:   Diagnosis Date    Cancer [facility]) 2014    prostate Dr Dhruv Domingo s/p rads with cyber knife     CHF (congestive heart failure) ([address] )     Dupuytrens contracture     GERD (gastroesophageal reflux disease)     Nocturia     Reflux esophagitis        Past Surgical History:  Past Surgical History:   Procedure Laterality Date    COLONOSCOPY  2000 & 2013    Dr Maryann Strong repeat 2018    COLONOSCOPY  02/08/2018    Dr. Divya Lester polyp
Results   Component Value Date    TSH 0.68 08/01/2016    TSHREFLEX 0.92 07/01/2022     Lab Results   Component Value Date/Time    ALKPHOS 235 07/11/2023 05:44 PM    ALT 50 07/11/2023 05:44 PM    AST 50 07/11/2023 05:44 PM    PROT 5.2 07/11/2023 05:44 PM    PROT 6.8 02/14/2013 04:18 PM    BILITOT 0.6 07/11/2023 05:44 PM    BILIDIR <0.2 07/11/2023 05:44 PM    LABALBU 2.2 07/11/2023 05:44 PM        EKG 6/29/2023  Atrial flutter    EKG 7/12/2023  Atrial flutter    ECHO 7/1   Summary   Normal left ventricle size, wall thickness, and systolic function with an   estimated ejection fraction of 50%. No regional wall motion abnormalities   are seen. Diastolic filling parameters suggest grade I diastolic   dysfunction. Mild mitral regurgitation is present. The aortic valve is   thickened/calcified with decreased leaflet mobility consistent with aortic   stenosis. Moderate aortic regurgitation is present. Mild aortic stenosis with a peak   velocity of 2.48m/s and a mean pressure gradient of 15mmHg. Mild tricuspid regurgitation. Systolic pulmonary artery pressure (SPAP)   estimated at 31 mmHg (RA pressure 3 mmHg).     Assessment/Plan:     AFL and atrial fibrillation  Pancytopenia  Dyspnea on exertion     - Patient here with pancytopenia, shortness of breath with exertion that has persisted since last hospitalization at the end of June, and atrial flutter on this admission, given that shortness of breath had persisted through the last hospitalization and discharge it is not clearly related to arrhythmia (patient was in sinus rhythm for multiple days towards the end of last admission), he is anemic, CT PE with nonspecific findings possibly representing pneumonia which these may explain his shortness of breath  - should be anticoagulated when feasible for elevated [id number] score and recent cardioversion, currently with pancytopenia and anemia requiring transfusion   - continue amiodarone 200mg daily   - metoprolol held  - EP

## 2023-07-13 PROBLEM — I48.0 PAROXYSMAL ATRIAL FIBRILLATION (HCC): Status: ACTIVE | Noted: 2023-07-13

## 2023-07-13 LAB
ANION GAP SERPL CALCULATED.3IONS-SCNC: 8 MMOL/L (ref 3–16)
ANISOCYTOSIS BLD QL SMEAR: ABNORMAL
BASOPHILS # BLD: 0 K/UL (ref 0–0.2)
BASOPHILS NFR BLD: 0 %
BUN SERPL-MCNC: 28 MG/DL (ref 7–20)
BURR CELLS BLD QL SMEAR: ABNORMAL
CALCIUM SERPL-MCNC: 7.5 MG/DL (ref 8.3–10.6)
CHLORIDE SERPL-SCNC: 103 MMOL/L (ref 99–110)
CO2 SERPL-SCNC: 22 MMOL/L (ref 21–32)
CREAT SERPL-MCNC: 0.7 MG/DL (ref 0.8–1.3)
DEPRECATED RDW RBC AUTO: 21.2 % (ref 12.4–15.4)
EOSINOPHIL # BLD: 0 K/UL (ref 0–0.6)
EOSINOPHIL NFR BLD: 0 %
GFR SERPLBLD CREATININE-BSD FMLA CKD-EPI: >60 ML/MIN/{1.73_M2}
GLUCOSE SERPL-MCNC: 134 MG/DL (ref 70–99)
HCT VFR BLD AUTO: 21.4 % (ref 40.5–52.5)
HCT VFR BLD AUTO: 23.9 % (ref 40.5–52.5)
HCT VFR BLD AUTO: 24.1 % (ref 40.5–52.5)
HGB BLD-MCNC: 7.3 G/DL (ref 13.5–17.5)
HGB BLD-MCNC: 8 G/DL (ref 13.5–17.5)
HGB BLD-MCNC: 8.1 G/DL (ref 13.5–17.5)
LEGIONELLA AG UR QL: NORMAL
LYMPHOCYTES # BLD: 0 K/UL (ref 1–5.1)
LYMPHOCYTES NFR BLD: 2 %
MACROCYTES BLD QL SMEAR: ABNORMAL
MCH RBC QN AUTO: 29.2 PG (ref 26–34)
MCHC RBC AUTO-ENTMCNC: 33.8 G/DL (ref 31–36)
MCV RBC AUTO: 86.4 FL (ref 80–100)
METAMYELOCYTES NFR BLD MANUAL: 1 %
MICROCYTES BLD QL SMEAR: ABNORMAL
MONOCYTES # BLD: 0.1 K/UL (ref 0–1.3)
MONOCYTES NFR BLD: 4 %
MYELOCYTES NFR BLD MANUAL: 1 %
NEUTROPHILS # BLD: 2 K/UL (ref 1.7–7.7)
NEUTROPHILS NFR BLD: 92 %
PLATELET # BLD AUTO: 25 K/UL (ref 135–450)
PLATELET BLD QL SMEAR: ABNORMAL
PMV BLD AUTO: 10.6 FL (ref 5–10.5)
POLYCHROMASIA BLD QL SMEAR: ABNORMAL
POTASSIUM SERPL-SCNC: 4.2 MMOL/L (ref 3.5–5.1)
RBC # BLD AUTO: 2.79 M/UL (ref 4.2–5.9)
S PNEUM AG UR QL: ABNORMAL
SARS-COV-2 RDRP RESP QL NAA+PROBE: NOT DETECTED
SCHISTOCYTES BLD QL SMEAR: ABNORMAL
SLIDE REVIEW: ABNORMAL
SODIUM SERPL-SCNC: 133 MMOL/L (ref 136–145)
VANCOMYCIN SERPL-MCNC: 11.6 UG/ML
WBC # BLD AUTO: 2.1 K/UL (ref 4–11)

## 2023-07-13 PROCEDURE — 87635 SARS-COV-2 COVID-19 AMP PRB: CPT

## 2023-07-13 PROCEDURE — 85025 COMPLETE CBC W/AUTO DIFF WBC: CPT

## 2023-07-13 PROCEDURE — 2060000000 HC ICU INTERMEDIATE R&B

## 2023-07-13 PROCEDURE — 6360000002 HC RX W HCPCS

## 2023-07-13 PROCEDURE — 2580000003 HC RX 258

## 2023-07-13 PROCEDURE — 85014 HEMATOCRIT: CPT

## 2023-07-13 PROCEDURE — 85018 HEMOGLOBIN: CPT

## 2023-07-13 PROCEDURE — 80202 ASSAY OF VANCOMYCIN: CPT

## 2023-07-13 PROCEDURE — 6370000000 HC RX 637 (ALT 250 FOR IP)

## 2023-07-13 PROCEDURE — 80048 BASIC METABOLIC PNL TOTAL CA: CPT

## 2023-07-13 PROCEDURE — 99232 SBSQ HOSP IP/OBS MODERATE 35: CPT | Performed by: INTERNAL MEDICINE

## 2023-07-13 RX ADMIN — VANCOMYCIN HYDROCHLORIDE 750 MG: 10 INJECTION, POWDER, LYOPHILIZED, FOR SOLUTION INTRAVENOUS at 21:49

## 2023-07-13 RX ADMIN — CALCIUM 500 MG: 500 TABLET ORAL at 09:19

## 2023-07-13 RX ADMIN — LATANOPROST 1 DROP: 50 SOLUTION OPHTHALMIC at 21:47

## 2023-07-13 RX ADMIN — PANTOPRAZOLE SODIUM 40 MG: 40 TABLET, DELAYED RELEASE ORAL at 06:30

## 2023-07-13 RX ADMIN — AMIODARONE HYDROCHLORIDE 200 MG: 200 TABLET ORAL at 09:26

## 2023-07-13 RX ADMIN — Medication 2000 UNITS: at 09:19

## 2023-07-13 RX ADMIN — DEXAMETHASONE 4 MG: 4 TABLET ORAL at 09:19

## 2023-07-13 RX ADMIN — DEXAMETHASONE 4 MG: 4 TABLET ORAL at 20:18

## 2023-07-13 RX ADMIN — SODIUM CHLORIDE 25 ML: 9 INJECTION, SOLUTION INTRAVENOUS at 01:42

## 2023-07-13 RX ADMIN — VANCOMYCIN HYDROCHLORIDE 750 MG: 10 INJECTION, POWDER, LYOPHILIZED, FOR SOLUTION INTRAVENOUS at 09:13

## 2023-07-13 RX ADMIN — SENNOSIDES 8.6 MG: 8.6 TABLET, FILM COATED ORAL at 09:18

## 2023-07-13 RX ADMIN — PIPERACILLIN AND TAZOBACTAM 4500 MG: 4; .5 INJECTION, POWDER, LYOPHILIZED, FOR SOLUTION INTRAVENOUS at 18:29

## 2023-07-13 RX ADMIN — PIPERACILLIN AND TAZOBACTAM 4500 MG: 4; .5 INJECTION, POWDER, LYOPHILIZED, FOR SOLUTION INTRAVENOUS at 01:43

## 2023-07-13 RX ADMIN — SODIUM CHLORIDE, PRESERVATIVE FREE 10 ML: 5 INJECTION INTRAVENOUS at 09:19

## 2023-07-13 RX ADMIN — PIPERACILLIN AND TAZOBACTAM 4500 MG: 4; .5 INJECTION, POWDER, LYOPHILIZED, FOR SOLUTION INTRAVENOUS at 10:00

## 2023-07-13 ASSESSMENT — ENCOUNTER SYMPTOMS
CHEST TIGHTNESS: 0
CONSTIPATION: 0
DIARRHEA: 0
BLOOD IN STOOL: 0
ABDOMINAL PAIN: 0
NAUSEA: 0
COUGH: 0
CHOKING: 0
ABDOMINAL DISTENTION: 0
SHORTNESS OF BREATH: 0
VOMITING: 0

## 2023-07-13 ASSESSMENT — PAIN SCALES - GENERAL
PAINLEVEL_OUTOF10: 0
PAINLEVEL_OUTOF10: 1
PAINLEVEL_OUTOF10: 0

## 2023-07-13 NOTE — PLAN OF CARE
Problem: Nutrition Deficit:  Goal: Optimize nutritional status  7/13/2023 1122 by Jean Somers RN  Outcome: Progressing  Flowsheets (Taken 7/13/2023 1122)  Nutrient intake appropriate for improving, restoring, or maintaining nutritional needs:   Monitor oral intake, labs, and treatment plans   Assess nutritional status and recommend course of action  Note: Appetite fair today. Tolerating diet well. Will monitor. Problem: Chronic Conditions and Co-morbidities  Goal: Patient's chronic conditions and co-morbidity symptoms are monitored and maintained or improved  7/13/2023 1122 by Jean Somers RN  Outcome: Progressing  Flowsheets (Taken 7/13/2023 1122)  Care Plan - Patient's Chronic Conditions and Co-Morbidity Symptoms are Monitored and Maintained or Improved:   Monitor and assess patient's chronic conditions and comorbid symptoms for stability, deterioration, or improvement   Collaborate with multidisciplinary team to address chronic and comorbid conditions and prevent exacerbation or deterioration    Problem: Safety - Adult  Goal: Free from fall injury  7/13/2023 1122 by Jean Somers RN  Outcome: Progressing  Note: Patient has remained free of injury. Safety precautions remain in place. Will monitor.

## 2023-07-13 NOTE — CARE COORDINATION
Case Management Assessment  Initial Evaluation    Date/Time of Evaluation: 7/13/2023 10:27 AM  Assessment Completed by: Willie Angel    If patient is discharged prior to next notation, then this note serves as note for discharge by case management. Patient Name: Wendy Baron                   YOB: 1950  Diagnosis: Hyponatremia [E87.1]  Patient on antineoplastic chemotherapy regimen [Z79.899]  Pancytopenia ([address]) [D61.818]  Prostate cancer metastatic to multiple sites ([address]) Rosanne Monroy  Sepsis ([address]) [A41.9]  Multifocal pneumonia [J18.9]  Sepsis with acute organ dysfunction without septic shock, due to unspecified organism, unspecified type ([address]) [A41.9, R65.20]                   Date / Time: 7/11/2023  4:56 PM    Patient Admission Status: Inpatient   Readmission Risk (Low < 19, Mod (19-27), High > 27): Readmission Risk Score: 24.9    Current PCP: Lisandro Cooper MD  PCP verified by CM? No    Chart Reviewed: Yes      History Provided by: Patient, Medical Record  Patient Orientation: Alert and Oriented    Patient Cognition: Alert    Hospitalization in the last 30 days (Readmission):  Yes    If yes, Readmission Assessment in CM Navigator will be completed.     Readmission Assessment  Number of Days since last admission?: 1-7 days  Previous Disposition: SNF  Who is being Interviewed: Patient  What was the patient's/caregiver's perception as to why they think they needed to return back to the hospital?: Other (Comment) (SOB)  Did you visit your Primary Care Physician after you left the hospital, before you returned this time?: No  Why weren't you able to visit your PCP?: Other (Comment) (SNF)  Did you see a specialist, such as Cardiac, Pulmonary, Orthopedic Physician, etc. after you left the hospital?: No  Who advised the patient to return to the hospital?: Skilled Unit  Does the patient report anything that got in the way of taking their medications?: No  In our efforts to provide the best possible care to

## 2023-07-13 NOTE — PLAN OF CARE
Problem: Safety - Adult  Goal: Free from fall injury  7/13/2023 0520 by Dayana Valencia RN  Outcome: Progressing  Flowsheets (Taken 7/13/2023 0520)  Free From Fall Injury: Instruct family/caregiver on patient safety     Problem: Discharge Planning  Goal: Discharge to home or other facility with appropriate resources  7/13/2023 0520 by Dayana Valencia RN  Outcome: Progressing  Flowsheets (Taken 7/13/2023 0520)  Discharge to home or other facility with appropriate resources:   Identify barriers to discharge with patient and caregiver   Arrange for needed discharge resources and transportation as appropriate   Identify discharge learning needs (meds, wound care, etc)

## 2023-07-14 LAB
ACANTHOCYTES BLD QL SMEAR: ABNORMAL
ANION GAP SERPL CALCULATED.3IONS-SCNC: 10 MMOL/L (ref 3–16)
ANISOCYTOSIS BLD QL SMEAR: ABNORMAL
BACTERIA SPEC RESP CULT: NORMAL
BASOPHILS # BLD: 0 K/UL (ref 0–0.2)
BASOPHILS NFR BLD: 0 %
BUN SERPL-MCNC: 29 MG/DL (ref 7–20)
CALCIUM SERPL-MCNC: 7.5 MG/DL (ref 8.3–10.6)
CHLORIDE SERPL-SCNC: 106 MMOL/L (ref 99–110)
CO2 SERPL-SCNC: 20 MMOL/L (ref 21–32)
CREAT SERPL-MCNC: 0.8 MG/DL (ref 0.8–1.3)
DEPRECATED RDW RBC AUTO: 21.6 % (ref 12.4–15.4)
EKG ATRIAL RATE: 89 BPM
EKG DIAGNOSIS: NORMAL
EKG P AXIS: 49 DEGREES
EKG P-R INTERVAL: 142 MS
EKG Q-T INTERVAL: 362 MS
EKG QRS DURATION: 84 MS
EKG QTC CALCULATION (BAZETT): 440 MS
EKG R AXIS: 63 DEGREES
EKG T AXIS: 65 DEGREES
EKG VENTRICULAR RATE: 89 BPM
EOSINOPHIL # BLD: 0 K/UL (ref 0–0.6)
EOSINOPHIL NFR BLD: 0 %
GFR SERPLBLD CREATININE-BSD FMLA CKD-EPI: >60 ML/MIN/{1.73_M2}
GLUCOSE SERPL-MCNC: 132 MG/DL (ref 70–99)
GRAM STN SPEC: NORMAL
HCT VFR BLD AUTO: 22.7 % (ref 40.5–52.5)
HCT VFR BLD AUTO: 23.5 % (ref 40.5–52.5)
HGB BLD-MCNC: 7.5 G/DL (ref 13.5–17.5)
HGB BLD-MCNC: 7.8 G/DL (ref 13.5–17.5)
LYMPHOCYTES # BLD: 0.1 K/UL (ref 1–5.1)
LYMPHOCYTES NFR BLD: 3 %
MACROCYTES BLD QL SMEAR: ABNORMAL
MAGNESIUM SERPL-MCNC: 1.9 MG/DL (ref 1.8–2.4)
MCH RBC QN AUTO: 29 PG (ref 26–34)
MCHC RBC AUTO-ENTMCNC: 33 G/DL (ref 31–36)
MCV RBC AUTO: 87.7 FL (ref 80–100)
METAMYELOCYTES NFR BLD MANUAL: 11 %
MONOCYTES # BLD: 0 K/UL (ref 0–1.3)
MONOCYTES NFR BLD: 2 %
MRSA SPEC QL CULT: NORMAL
NEUTROPHILS # BLD: 1.9 K/UL (ref 1.7–7.7)
NEUTROPHILS NFR BLD: 82 %
NEUTS BAND NFR BLD MANUAL: 2 % (ref 0–7)
NRBC BLD-RTO: 6 /100 WBC
OVALOCYTES BLD QL SMEAR: ABNORMAL
PATH INTERP BLD-IMP: NO
PLATELET # BLD AUTO: 31 K/UL (ref 135–450)
PLATELET BLD QL SMEAR: ABNORMAL
PMV BLD AUTO: 10.3 FL (ref 5–10.5)
POTASSIUM SERPL-SCNC: 4.3 MMOL/L (ref 3.5–5.1)
RBC # BLD AUTO: 2.59 M/UL (ref 4.2–5.9)
SLIDE REVIEW: ABNORMAL
SODIUM SERPL-SCNC: 136 MMOL/L (ref 136–145)
STOMATOCYTES BLD QL SMEAR: ABNORMAL
WBC # BLD AUTO: 2 K/UL (ref 4–11)

## 2023-07-14 PROCEDURE — 80048 BASIC METABOLIC PNL TOTAL CA: CPT

## 2023-07-14 PROCEDURE — 97166 OT EVAL MOD COMPLEX 45 MIN: CPT

## 2023-07-14 PROCEDURE — 85018 HEMOGLOBIN: CPT

## 2023-07-14 PROCEDURE — 97535 SELF CARE MNGMENT TRAINING: CPT

## 2023-07-14 PROCEDURE — 99232 SBSQ HOSP IP/OBS MODERATE 35: CPT | Performed by: INTERNAL MEDICINE

## 2023-07-14 PROCEDURE — 83735 ASSAY OF MAGNESIUM: CPT

## 2023-07-14 PROCEDURE — 85014 HEMATOCRIT: CPT

## 2023-07-14 PROCEDURE — 6370000000 HC RX 637 (ALT 250 FOR IP)

## 2023-07-14 PROCEDURE — 97530 THERAPEUTIC ACTIVITIES: CPT

## 2023-07-14 PROCEDURE — 85025 COMPLETE CBC W/AUTO DIFF WBC: CPT

## 2023-07-14 PROCEDURE — 6360000002 HC RX W HCPCS

## 2023-07-14 PROCEDURE — 97162 PT EVAL MOD COMPLEX 30 MIN: CPT

## 2023-07-14 PROCEDURE — 2580000003 HC RX 258

## 2023-07-14 PROCEDURE — 2060000000 HC ICU INTERMEDIATE R&B

## 2023-07-14 RX ADMIN — DEXAMETHASONE 4 MG: 4 TABLET ORAL at 20:30

## 2023-07-14 RX ADMIN — AZITHROMYCIN MONOHYDRATE 500 MG: 500 INJECTION, POWDER, LYOPHILIZED, FOR SOLUTION INTRAVENOUS at 18:21

## 2023-07-14 RX ADMIN — PIPERACILLIN AND TAZOBACTAM 4500 MG: 4; .5 INJECTION, POWDER, LYOPHILIZED, FOR SOLUTION INTRAVENOUS at 03:14

## 2023-07-14 RX ADMIN — METOPROLOL TARTRATE 25 MG: 25 TABLET, FILM COATED ORAL at 17:31

## 2023-07-14 RX ADMIN — LATANOPROST 1 DROP: 50 SOLUTION OPHTHALMIC at 20:45

## 2023-07-14 RX ADMIN — PANTOPRAZOLE SODIUM 40 MG: 40 TABLET, DELAYED RELEASE ORAL at 09:09

## 2023-07-14 RX ADMIN — SODIUM CHLORIDE, PRESERVATIVE FREE 10 ML: 5 INJECTION INTRAVENOUS at 20:30

## 2023-07-14 RX ADMIN — DEXAMETHASONE 4 MG: 4 TABLET ORAL at 09:09

## 2023-07-14 RX ADMIN — Medication 2000 UNITS: at 09:09

## 2023-07-14 RX ADMIN — AMIODARONE HYDROCHLORIDE 200 MG: 200 TABLET ORAL at 09:09

## 2023-07-14 RX ADMIN — PIPERACILLIN AND TAZOBACTAM 4500 MG: 4; .5 INJECTION, POWDER, LYOPHILIZED, FOR SOLUTION INTRAVENOUS at 10:17

## 2023-07-14 RX ADMIN — CALCIUM 500 MG: 500 TABLET ORAL at 09:09

## 2023-07-14 RX ADMIN — CEFTRIAXONE SODIUM 2000 MG: 2 INJECTION, POWDER, FOR SOLUTION INTRAMUSCULAR; INTRAVENOUS at 19:30

## 2023-07-14 RX ADMIN — VANCOMYCIN HYDROCHLORIDE 750 MG: 10 INJECTION, POWDER, LYOPHILIZED, FOR SOLUTION INTRAVENOUS at 09:09

## 2023-07-14 RX ADMIN — SODIUM CHLORIDE, PRESERVATIVE FREE 10 ML: 5 INJECTION INTRAVENOUS at 13:23

## 2023-07-14 ASSESSMENT — ENCOUNTER SYMPTOMS
ABDOMINAL PAIN: 0
CHEST TIGHTNESS: 0
CONSTIPATION: 0
VOMITING: 0
DIARRHEA: 0
BLOOD IN STOOL: 0
NAUSEA: 0
COUGH: 0
CHOKING: 0
ABDOMINAL DISTENTION: 0
SHORTNESS OF BREATH: 0

## 2023-07-14 NOTE — CARE COORDINATION
PT/OT remain pending. SW spoke with pt at bedside. Pt confirmed he wants to return to [facility] when able and aware he will need insurance authorization again. SW will start precert once PT/OT notes are in. MAKEDA Wagner   for Jennifer ([address])  Office Phone: [phone] [address]: [phone]    Addendum at 2:50pm: PT/OT recommended SNF. Completed Precert on [employer] for [facility]. Pt approved from 7/15-07/17/2023 with authorization request # of [id number]. It also stated \"This authorization is valid for admission through 07/17/2023. If the member does not admit by 07/15/2023, please contact [address] [place] at [phone]. \"    Spoke with El Stone, admissions at [address] [place] [address] notifying of precert approval and she stated she can be reached at [phone] if pt is ready over the weekend. Spoke with pt and pt's friend Segundo Charlton at bedside with pt permission. They were updated on the above and stated agreement with returning to [facility].  They stated pt is concerned with paying his taxes for [employer]; encouraged him to call them back and see if there is a payment, etc he can be put on since he is in the hospital.

## 2023-07-14 NOTE — PLAN OF CARE
Problem: Skin/Tissue Integrity  Goal: Absence of new skin breakdown  Description: 1. Monitor for areas of redness and/or skin breakdown  2. Assess vascular access sites hourly  3. Every 4-6 hours minimum:  Change oxygen saturation probe site  4. Every 4-6 hours:  If on nasal continuous positive airway pressure, respiratory therapy assess nares and determine need for appliance change or resting period. Outcome: Not Progressing  Note: See skin flowsheet. Multiple wounds to buttocks. Offloading every 2 hours. Sacral heart removed due to intermittent incontinence. Zinc applied. Waffle cushion utilized in chair. Specialty bed ordered. Pt wishes to wait until later to move to new bed. Pt has generalized weakness. PT/OT following. Problem: Skin/Tissue Integrity  Goal: Absence of new skin breakdown  Description: 1. Monitor for areas of redness and/or skin breakdown  2. Assess vascular access sites hourly  3. Every 4-6 hours minimum:  Change oxygen saturation probe site  4. Every 4-6 hours:  If on nasal continuous positive airway pressure, respiratory therapy assess nares and determine need for appliance change or resting period. Outcome: Not Progressing  Note: See skin flowsheet. Multiple wounds to buttocks. Offloading every 2 hours. Sacral heart removed due to intermittent incontinence. Zinc applied. Waffle cushion utilized in chair. Specialty bed ordered. Pt wishes to wait until later to move to new bed. Pt has generalized weakness. PT/OT following.

## 2023-07-15 VITALS
WEIGHT: 120.7 LBS | DIASTOLIC BLOOD PRESSURE: 70 MMHG | RESPIRATION RATE: 16 BRPM | OXYGEN SATURATION: 97 % | BODY MASS INDEX: 17.88 KG/M2 | HEIGHT: 69 IN | HEART RATE: 85 BPM | SYSTOLIC BLOOD PRESSURE: 108 MMHG | TEMPERATURE: 97.5 F

## 2023-07-15 LAB
ACANTHOCYTES BLD QL SMEAR: ABNORMAL
ANION GAP SERPL CALCULATED.3IONS-SCNC: 10 MMOL/L (ref 3–16)
ANISOCYTOSIS BLD QL SMEAR: ABNORMAL
BACTERIA BLD CULT ORG #2: NORMAL
BACTERIA BLD CULT: NORMAL
BASOPHILS # BLD: 0 K/UL (ref 0–0.2)
BASOPHILS NFR BLD: 0 %
BUN SERPL-MCNC: 26 MG/DL (ref 7–20)
CALCIUM SERPL-MCNC: 7.6 MG/DL (ref 8.3–10.6)
CHLORIDE SERPL-SCNC: 107 MMOL/L (ref 99–110)
CO2 SERPL-SCNC: 21 MMOL/L (ref 21–32)
CREAT SERPL-MCNC: 0.8 MG/DL (ref 0.8–1.3)
DACRYOCYTES BLD QL SMEAR: ABNORMAL
DEPRECATED RDW RBC AUTO: 20.9 % (ref 12.4–15.4)
EOSINOPHIL # BLD: 0 K/UL (ref 0–0.6)
EOSINOPHIL NFR BLD: 0 %
GFR SERPLBLD CREATININE-BSD FMLA CKD-EPI: >60 ML/MIN/{1.73_M2}
GLUCOSE SERPL-MCNC: 116 MG/DL (ref 70–99)
HCT VFR BLD AUTO: 23 % (ref 40.5–52.5)
HCT VFR BLD AUTO: 23.6 % (ref 40.5–52.5)
HGB BLD-MCNC: 7.8 G/DL (ref 13.5–17.5)
HGB BLD-MCNC: 7.8 G/DL (ref 13.5–17.5)
HYPOCHROMIA BLD QL SMEAR: ABNORMAL
LYMPHOCYTES # BLD: 0 K/UL (ref 1–5.1)
LYMPHOCYTES NFR BLD: 3 %
MAGNESIUM SERPL-MCNC: 1.9 MG/DL (ref 1.8–2.4)
MCH RBC QN AUTO: 29.2 PG (ref 26–34)
MCHC RBC AUTO-ENTMCNC: 33.2 G/DL (ref 31–36)
MCV RBC AUTO: 87.7 FL (ref 80–100)
METAMYELOCYTES NFR BLD MANUAL: 3 %
MICROCYTES BLD QL SMEAR: ABNORMAL
MONOCYTES # BLD: 0.1 K/UL (ref 0–1.3)
MONOCYTES NFR BLD: 7 %
MYELOCYTES NFR BLD MANUAL: 3 %
NEUTROPHILS # BLD: 1.4 K/UL (ref 1.7–7.7)
NEUTROPHILS NFR BLD: 80 %
NEUTS BAND NFR BLD MANUAL: 1 % (ref 0–7)
NRBC BLD-RTO: 4 /100 WBC
OVALOCYTES BLD QL SMEAR: ABNORMAL
PLATELET # BLD AUTO: 27 K/UL (ref 135–450)
PMV BLD AUTO: 9.8 FL (ref 5–10.5)
POIKILOCYTOSIS BLD QL SMEAR: ABNORMAL
POLYCHROMASIA BLD QL SMEAR: ABNORMAL
POTASSIUM SERPL-SCNC: 4.2 MMOL/L (ref 3.5–5.1)
PROMYELOCYTES NFR BLD MANUAL: 3 %
RBC # BLD AUTO: 2.68 M/UL (ref 4.2–5.9)
SCHISTOCYTES BLD QL SMEAR: ABNORMAL
SODIUM SERPL-SCNC: 138 MMOL/L (ref 136–145)
WBC # BLD AUTO: 1.6 K/UL (ref 4–11)

## 2023-07-15 PROCEDURE — 80048 BASIC METABOLIC PNL TOTAL CA: CPT

## 2023-07-15 PROCEDURE — 2580000003 HC RX 258

## 2023-07-15 PROCEDURE — 6370000000 HC RX 637 (ALT 250 FOR IP)

## 2023-07-15 PROCEDURE — 83735 ASSAY OF MAGNESIUM: CPT

## 2023-07-15 PROCEDURE — 6360000002 HC RX W HCPCS

## 2023-07-15 PROCEDURE — 85014 HEMATOCRIT: CPT

## 2023-07-15 PROCEDURE — 99238 HOSP IP/OBS DSCHRG MGMT 30/<: CPT | Performed by: INTERNAL MEDICINE

## 2023-07-15 PROCEDURE — 85025 COMPLETE CBC W/AUTO DIFF WBC: CPT

## 2023-07-15 PROCEDURE — 85018 HEMOGLOBIN: CPT

## 2023-07-15 RX ORDER — CEFUROXIME AXETIL 500 MG/1
500 TABLET ORAL 2 TIMES DAILY
Qty: 14 TABLET | Refills: 0 | Status: SHIPPED | OUTPATIENT
Start: 2023-07-15 | End: 2023-07-22

## 2023-07-15 RX ORDER — MAGNESIUM SULFATE IN WATER 40 MG/ML
2000 INJECTION, SOLUTION INTRAVENOUS ONCE
Status: COMPLETED | OUTPATIENT
Start: 2023-07-15 | End: 2023-07-15

## 2023-07-15 RX ORDER — HEPARIN SODIUM 100 [USP'U]/ML
500 INJECTION, SOLUTION INTRAVENOUS
Status: COMPLETED | OUTPATIENT
Start: 2023-07-15 | End: 2023-07-15

## 2023-07-15 RX ADMIN — Medication 500 UNITS: at 16:34

## 2023-07-15 RX ADMIN — SODIUM CHLORIDE, PRESERVATIVE FREE 10 ML: 5 INJECTION INTRAVENOUS at 08:45

## 2023-07-15 RX ADMIN — MAGNESIUM SULFATE HEPTAHYDRATE 2000 MG: 40 INJECTION, SOLUTION INTRAVENOUS at 09:07

## 2023-07-15 RX ADMIN — AMIODARONE HYDROCHLORIDE 200 MG: 200 TABLET ORAL at 08:45

## 2023-07-15 RX ADMIN — CALCIUM 500 MG: 500 TABLET ORAL at 08:45

## 2023-07-15 RX ADMIN — METOPROLOL TARTRATE 25 MG: 25 TABLET, FILM COATED ORAL at 08:45

## 2023-07-15 RX ADMIN — Medication 2000 UNITS: at 08:45

## 2023-07-15 RX ADMIN — DEXAMETHASONE 4 MG: 4 TABLET ORAL at 08:45

## 2023-07-15 RX ADMIN — PANTOPRAZOLE SODIUM 40 MG: 40 TABLET, DELAYED RELEASE ORAL at 06:51

## 2023-07-15 ASSESSMENT — ENCOUNTER SYMPTOMS
VOMITING: 0
CHEST TIGHTNESS: 0
CHOKING: 0
ABDOMINAL PAIN: 0
ABDOMINAL DISTENTION: 0
CONSTIPATION: 0
BLOOD IN STOOL: 0
DIARRHEA: 0
SHORTNESS OF BREATH: 0
COUGH: 0
NAUSEA: 0

## 2023-07-15 NOTE — PLAN OF CARE
Problem: Discharge Planning  Goal: Discharge to home or other facility with appropriate resources  Outcome: Completed   Pt is being discharged back to facility. Pt verbalizes understanding of discharge instructions and medications. Problem: Safety - Adult  Goal: Free from fall injury  Outcome: Completed   Pt remained free from falls. Used david steady to move pt to chair and back to bed. Pt on bed alarm with call light in reach. Problem: Skin/Tissue Integrity  Goal: Absence of new skin breakdown  Outcome: Completed   Small areas of skin breakdown on coccyx area. Zinc cream was applied to coccyx after every bowel movement to prevent further breakdown. Pt repositioned with pillow support throughout shift.

## 2023-07-15 NOTE — DISCHARGE INSTR - COC
Continuity of Care Form    Patient Name: Jhonny Miguel   :  1950  MRN:  [id number]    Admit date:  2023  Discharge date:  7/15/2023    Code Status Order: Full Code   Advance Directives:     Admitting Physician:  Amna Jonas MD  PCP: Feliberto Neal MD    Discharging Nurse: Melo Schneider, [address] Unit/Room#: 4303/2745-42  Discharging Unit Phone Number: [phone]    Emergency Contact:   Extended Emergency Contact Information  Primary Emergency Contact:  [facility] in [place] of [address] Phone: [phone]  Mobile Phone: [phone]  Relation: Brother/Sister  Secondary Emergency Contact: [address] Phone: [phone]  Relation: Other    Past Surgical History:  Past Surgical History:   Procedure Laterality Date    COLONOSCOPY   &     Dr Flakito Florence repeat 2018    COLONOSCOPY  2018    Dr. Chago Lozano polyp removed0- repeat     CT NEEDLE BIOPSY LIVER PERCUTANEOUS  2023    CT NEEDLE BIOPSY LIVER PERCUTANEOUS 2023 [address] SCAN    ENDOSCOPY, COLON, DIAGNOSTIC      EYE SURGERY      detached retina bilat     FINGER TRIGGER RELEASE Left 2013    middle finger    PORT SURGERY Right 2023    PORT PLACEMENT, RIGHT INTERNAL JUGULAR performed by Angie Vazquez MD at [address] [place] [address]  10/2013    mult Bx  1 +Bx -s/p cyber knife 2014    TONSILLECTOMY         Immunization History:   Immunization History   Administered Date(s) Administered    COVID-19, MODERNA BLUE border, Primary or Immunocompromised, (age 12y+), IM, 100 mcg/0.5mL 2021, 2021, 10/26/2021, 2022    COVID-19, MODERNA Bivalent, (age 12y+), IM, 50 mcg/0.5 mL 2022    Influenza Virus Vaccine 2008, 2009    Influenza, FLUZONE (age 72 y+), High Dose, 0.7mL 2020, 2021, 10/06/2022    Influenza, High Dose (Fluzone 65 yrs and older) 2014, 10/27/2015, 10/14/2016, 10/20/2017, 2018, 10/21/2019    Pneumococcal, PCV-13,

## 2023-07-15 NOTE — DISCHARGE SUMMARY
INTERNAL MEDICINE DEPARTMENT AT [address] [place] [address]  DISCHARGE SUMMARY    Patient ID: Gerardine Frankel Rodner                                             Discharge Date: 7/15/2023   Patient's PCP: Gloria Navarro MD                                          Discharge Physician: Bev Harding MD MD  Admit Date: 7/11/2023   Admitting Physician: Lex Toledo MD    PROBLEMS DURING HOSPITALIZATION:  Present on Admission:   Sepsis ([address])   Paroxysmal atrial fibrillation ([address])      DISCHARGE DIAGNOSES:    HPI:Toyin Nick is a 75-year-old male with past medical history of A-flutter, CHF, GERD, CKD, relapsed metastatic prostate cancer was brought to the ED after routine labs revealed low hemoglobin <7 at his facility accompanied by Shortness of breath. Patient was recently admitted for lightheadedness last week. Patient does complain of shortness of breath which has been going on for 1 week which was prior to discharge. He complains of getting shortness of breath on exertion and on getting out of bed. Patient denies fever or any sick contacts. Patient had cycle of chemo 2 weeks ago and has ongoing pancytopenia. In the ED, patient was hypotensive in the late 80s, tachycardic in the late 90s to 100. Hemoglobin was 7.4 liver enzymes were elevated in the 50s. Lactic acid was elevated at 5.7. Cultures were drawn urinalysis was normal.  CT chest revealed no evidence of pulmonary embolism but did show dense consolidation in the left lower lobe concerning for pneumonia. CT abdomen pelvis revealed some acetic fluid, decreased size of hepatic metastasis. Patient denies any cough, nausea, vomiting, chest pain, abdominal pain, change in urination, bowel habits, pain in extremities. Patient did report loss of appetite weight loss 20 pounds in 6 weeks. Patient quit smoking cigarettes in 1980. His alcohol intake is 2 drinks a month. Patient was admitted for treatment for sepsis.      Additional Hx  Hx prostate cancer, dx

## 2023-07-15 NOTE — CARE COORDINATION
Case Management Assessment            Discharge Note                    Date / Time of Note: 7/15/2023 2:14 PM                  Discharge Note Completed by: CONNIE Jaimes    Patient Name: Mercie Severance   YOB: 1950  Diagnosis: Hyponatremia [E87.1]  Patient on antineoplastic chemotherapy regimen [Z79.899]  Pancytopenia ([address]) [U77.299]  Prostate cancer metastatic to multiple sites ([address]) [facility]  Sepsis ([address]) [A41.9]  Multifocal pneumonia [J18.9]  Sepsis with acute organ dysfunction without septic shock, due to unspecified organism, unspecified type ([address]) [A41.9, R65.20]   Date / Time: 7/11/2023  4:56 PM    Current PCP: Lelo Vidal MD  Clinic patient: No    Hospitalization in the last 30 days: Yes  Readmission Assessment  Number of Days since last admission?: 1-7 days  Previous Disposition: SNF  Who is being Interviewed: Patient  What was the patient's/caregiver's perception as to why they think they needed to return back to the hospital?: Other (Comment) (SOB)  Did you visit your Primary Care Physician after you left the hospital, before you returned this time?: No  Why weren't you able to visit your PCP?: Other (Comment) (SNF)  Did you see a specialist, such as Cardiac, Pulmonary, Orthopedic Physician, etc. after you left the hospital?: No  Who advised the patient to return to the hospital?: Skilled Unit  Does the patient report anything that got in the way of taking their medications?: No  In our efforts to provide the best possible care to you and others like you, can you think of anything that we could have done to help you after you left the hospital the first time, so that you might not have needed to return so soon?: Other (Comment) (n/a)    Advance Directives:  Code Status: Full Code  [place] DNR form completed and on chart: Not Indicated    Financial:  Payor: Kathleen Baca / Plan: Pati Rivero ESSENTIAL/PLUS / Product Type: *No Product type* /      Pharmacy:    [facility]

## 2023-07-19 LAB — TSH SERPL DL<=0.005 MIU/L-ACNC: 0.7 UIU/ML (ref 0.27–4.2)

## 2023-08-09 ENCOUNTER — HOSPITAL ENCOUNTER (INPATIENT)
Age: 73
LOS: 15 days | Discharge: HOSPICE | End: 2023-08-24
Attending: EMERGENCY MEDICINE | Admitting: HOSPITALIST
Payer: MEDICARE

## 2023-08-09 ENCOUNTER — APPOINTMENT (OUTPATIENT)
Dept: GENERAL RADIOLOGY | Age: 73
End: 2023-08-09
Payer: MEDICARE

## 2023-08-09 DIAGNOSIS — D61.818 PANCYTOPENIA (HCC): Primary | ICD-10-CM

## 2023-08-09 PROBLEM — D64.9 ACUTE ANEMIA: Status: ACTIVE | Noted: 2023-08-09

## 2023-08-09 LAB
ABO + RH BLD: NORMAL
ALBUMIN SERPL-MCNC: 2.7 G/DL (ref 3.4–5)
ALP SERPL-CCNC: 103 U/L (ref 40–129)
ALT SERPL-CCNC: 21 U/L (ref 10–40)
ANION GAP SERPL CALCULATED.3IONS-SCNC: 12 MMOL/L (ref 3–16)
ANISOCYTOSIS BLD QL SMEAR: ABNORMAL
AST SERPL-CCNC: 16 U/L (ref 15–37)
BACTERIA URNS QL MICRO: ABNORMAL /HPF
BILIRUB DIRECT SERPL-MCNC: <0.2 MG/DL (ref 0–0.3)
BILIRUB INDIRECT SERPL-MCNC: ABNORMAL MG/DL (ref 0–1)
BILIRUB SERPL-MCNC: 0.3 MG/DL (ref 0–1)
BILIRUB UR QL STRIP.AUTO: NEGATIVE
BLD GP AB SCN SERPL QL: NORMAL
BLOOD BANK DISPENSE STATUS: NORMAL
BLOOD BANK PRODUCT CODE: NORMAL
BPU ID: NORMAL
BUN SERPL-MCNC: 28 MG/DL (ref 7–20)
CALCIUM SERPL-MCNC: 7.9 MG/DL (ref 8.3–10.6)
CHLORIDE SERPL-SCNC: 98 MMOL/L (ref 99–110)
CLARITY UR: CLEAR
CO2 SERPL-SCNC: 22 MMOL/L (ref 21–32)
COLOR UR: YELLOW
CREAT SERPL-MCNC: 0.7 MG/DL (ref 0.8–1.3)
DACRYOCYTES BLD QL SMEAR: ABNORMAL
DEPRECATED RDW RBC AUTO: 22 % (ref 12.4–15.4)
DESCRIPTION BLOOD BANK: NORMAL
GFR SERPLBLD CREATININE-BSD FMLA CKD-EPI: >60 ML/MIN/{1.73_M2}
GLUCOSE SERPL-MCNC: 145 MG/DL (ref 70–99)
GLUCOSE UR STRIP.AUTO-MCNC: NEGATIVE MG/DL
HCT VFR BLD AUTO: 17.7 % (ref 40.5–52.5)
HGB BLD-MCNC: 5.9 G/DL (ref 13.5–17.5)
HGB UR QL STRIP.AUTO: NEGATIVE
KETONES UR STRIP.AUTO-MCNC: NEGATIVE MG/DL
LACTATE BLDV-SCNC: 1.8 MMOL/L (ref 0.4–1.9)
LACTATE BLDV-SCNC: 2.3 MMOL/L (ref 0.4–1.9)
LACTATE BLDV-SCNC: 2.4 MMOL/L (ref 0.4–2)
LEUKOCYTE ESTERASE UR QL STRIP.AUTO: NEGATIVE
LIPASE SERPL-CCNC: 49 U/L (ref 13–60)
MACROCYTES BLD QL SMEAR: ABNORMAL
MAGNESIUM SERPL-MCNC: 1.8 MG/DL (ref 1.8–2.4)
MCH RBC QN AUTO: 29.6 PG (ref 26–34)
MCHC RBC AUTO-ENTMCNC: 33.3 G/DL (ref 31–36)
MCV RBC AUTO: 88.8 FL (ref 80–100)
NITRITE UR QL STRIP.AUTO: NEGATIVE
OVALOCYTES BLD QL SMEAR: ABNORMAL
PH UR STRIP.AUTO: 6 [PH] (ref 5–8)
PHOSPHATE SERPL-MCNC: 2.3 MG/DL (ref 2.5–4.9)
PLATELET # BLD AUTO: 17 K/UL (ref 135–450)
PMV BLD AUTO: 9.2 FL (ref 5–10.5)
POTASSIUM SERPL-SCNC: 4.5 MMOL/L (ref 3.5–5.1)
PROT SERPL-MCNC: 5.6 G/DL (ref 6.4–8.2)
PROT UR STRIP.AUTO-MCNC: 30 MG/DL
RBC # BLD AUTO: 1.99 M/UL (ref 4.2–5.9)
RBC #/AREA URNS HPF: ABNORMAL /HPF (ref 0–4)
RENAL EPI CELLS #/AREA UR COMP ASSIST: ABNORMAL /HPF (ref 0–1)
SODIUM SERPL-SCNC: 132 MMOL/L (ref 136–145)
SP GR UR STRIP.AUTO: 1.02 (ref 1–1.03)
UA DIPSTICK W REFLEX MICRO PNL UR: YES
URATE SERPL-MCNC: 3.4 MG/DL (ref 3.5–7.2)
URN SPEC COLLECT METH UR: ABNORMAL
UROBILINOGEN UR STRIP-ACNC: 0.2 E.U./DL
WBC # BLD AUTO: 0.1 K/UL (ref 4–11)
WBC #/AREA URNS HPF: ABNORMAL /HPF (ref 0–5)

## 2023-08-09 PROCEDURE — 96367 TX/PROPH/DG ADDL SEQ IV INF: CPT

## 2023-08-09 PROCEDURE — 87106 FUNGI IDENTIFICATION YEAST: CPT

## 2023-08-09 PROCEDURE — 84100 ASSAY OF PHOSPHORUS: CPT

## 2023-08-09 PROCEDURE — 80048 BASIC METABOLIC PNL TOTAL CA: CPT

## 2023-08-09 PROCEDURE — 84550 ASSAY OF BLOOD/URIC ACID: CPT

## 2023-08-09 PROCEDURE — 71046 X-RAY EXAM CHEST 2 VIEWS: CPT

## 2023-08-09 PROCEDURE — 86923 COMPATIBILITY TEST ELECTRIC: CPT

## 2023-08-09 PROCEDURE — P9040 RBC LEUKOREDUCED IRRADIATED: HCPCS

## 2023-08-09 PROCEDURE — 85045 AUTOMATED RETICULOCYTE COUNT: CPT

## 2023-08-09 PROCEDURE — 30233N1 TRANSFUSION OF NONAUTOLOGOUS RED BLOOD CELLS INTO PERIPHERAL VEIN, PERCUTANEOUS APPROACH: ICD-10-PCS | Performed by: HOSPITALIST

## 2023-08-09 PROCEDURE — 87253 VIRUS INOCULATE TISSUE ADDL: CPT

## 2023-08-09 PROCEDURE — 87205 SMEAR GRAM STAIN: CPT

## 2023-08-09 PROCEDURE — 83605 ASSAY OF LACTIC ACID: CPT

## 2023-08-09 PROCEDURE — 87252 VIRUS INOCULATION TISSUE: CPT

## 2023-08-09 PROCEDURE — 83615 LACTATE (LD) (LDH) ENZYME: CPT

## 2023-08-09 PROCEDURE — 2580000003 HC RX 258: Performed by: EMERGENCY MEDICINE

## 2023-08-09 PROCEDURE — 36415 COLL VENOUS BLD VENIPUNCTURE: CPT

## 2023-08-09 PROCEDURE — 83010 ASSAY OF HAPTOGLOBIN QUANT: CPT

## 2023-08-09 PROCEDURE — 87040 BLOOD CULTURE FOR BACTERIA: CPT

## 2023-08-09 PROCEDURE — 86900 BLOOD TYPING SEROLOGIC ABO: CPT

## 2023-08-09 PROCEDURE — 6360000002 HC RX W HCPCS: Performed by: EMERGENCY MEDICINE

## 2023-08-09 PROCEDURE — 81001 URINALYSIS AUTO W/SCOPE: CPT

## 2023-08-09 PROCEDURE — 86850 RBC ANTIBODY SCREEN: CPT

## 2023-08-09 PROCEDURE — 99285 EMERGENCY DEPT VISIT HI MDM: CPT

## 2023-08-09 PROCEDURE — 87102 FUNGUS ISOLATION CULTURE: CPT

## 2023-08-09 PROCEDURE — P9036 PLATELET PHERESIS IRRADIATED: HCPCS

## 2023-08-09 PROCEDURE — 85025 COMPLETE CBC W/AUTO DIFF WBC: CPT

## 2023-08-09 PROCEDURE — 83735 ASSAY OF MAGNESIUM: CPT

## 2023-08-09 PROCEDURE — 87070 CULTURE OTHR SPECIMN AEROBIC: CPT

## 2023-08-09 PROCEDURE — 86901 BLOOD TYPING SEROLOGIC RH(D): CPT

## 2023-08-09 PROCEDURE — P9016 RBC LEUKOCYTES REDUCED: HCPCS

## 2023-08-09 PROCEDURE — 83690 ASSAY OF LIPASE: CPT

## 2023-08-09 PROCEDURE — 1200000000 HC SEMI PRIVATE

## 2023-08-09 PROCEDURE — 87103 BLOOD FUNGUS CULTURE: CPT

## 2023-08-09 PROCEDURE — 96365 THER/PROPH/DIAG IV INF INIT: CPT

## 2023-08-09 PROCEDURE — 80076 HEPATIC FUNCTION PANEL: CPT

## 2023-08-09 RX ORDER — PANTOPRAZOLE SODIUM 40 MG/1
40 TABLET, DELAYED RELEASE ORAL
Status: DISCONTINUED | OUTPATIENT
Start: 2023-08-10 | End: 2023-08-18

## 2023-08-09 RX ORDER — METOPROLOL SUCCINATE 50 MG/1
50 TABLET, EXTENDED RELEASE ORAL DAILY
Status: DISCONTINUED | OUTPATIENT
Start: 2023-08-10 | End: 2023-08-24 | Stop reason: HOSPADM

## 2023-08-09 RX ORDER — SODIUM CHLORIDE 9 MG/ML
INJECTION, SOLUTION INTRAVENOUS CONTINUOUS
Status: ACTIVE | OUTPATIENT
Start: 2023-08-09 | End: 2023-08-10

## 2023-08-09 RX ORDER — LATANOPROST 50 UG/ML
1 SOLUTION/ DROPS OPHTHALMIC NIGHTLY
Status: DISCONTINUED | OUTPATIENT
Start: 2023-08-09 | End: 2023-08-24 | Stop reason: HOSPADM

## 2023-08-09 RX ORDER — SODIUM CHLORIDE 9 MG/ML
INJECTION, SOLUTION INTRAVENOUS PRN
Status: DISCONTINUED | OUTPATIENT
Start: 2023-08-09 | End: 2023-08-24 | Stop reason: HOSPADM

## 2023-08-09 RX ORDER — ACETAMINOPHEN 325 MG/1
650 TABLET ORAL EVERY 6 HOURS PRN
Status: DISCONTINUED | OUTPATIENT
Start: 2023-08-09 | End: 2023-08-24 | Stop reason: HOSPADM

## 2023-08-09 RX ORDER — ONDANSETRON 2 MG/ML
4 INJECTION INTRAMUSCULAR; INTRAVENOUS EVERY 6 HOURS PRN
Status: DISCONTINUED | OUTPATIENT
Start: 2023-08-09 | End: 2023-08-24 | Stop reason: HOSPADM

## 2023-08-09 RX ORDER — SODIUM CHLORIDE 0.9 % (FLUSH) 0.9 %
5-40 SYRINGE (ML) INJECTION PRN
Status: DISCONTINUED | OUTPATIENT
Start: 2023-08-09 | End: 2023-08-24 | Stop reason: HOSPADM

## 2023-08-09 RX ORDER — ACETAMINOPHEN 650 MG/1
650 SUPPOSITORY RECTAL EVERY 6 HOURS PRN
Status: DISCONTINUED | OUTPATIENT
Start: 2023-08-09 | End: 2023-08-24 | Stop reason: HOSPADM

## 2023-08-09 RX ORDER — ONDANSETRON 4 MG/1
4 TABLET, ORALLY DISINTEGRATING ORAL EVERY 8 HOURS PRN
Status: DISCONTINUED | OUTPATIENT
Start: 2023-08-09 | End: 2023-08-24 | Stop reason: HOSPADM

## 2023-08-09 RX ORDER — DEXAMETHASONE 4 MG/1
4 TABLET ORAL DAILY
Status: DISCONTINUED | OUTPATIENT
Start: 2023-08-10 | End: 2023-08-11

## 2023-08-09 RX ORDER — SENNOSIDES A AND B 8.6 MG/1
1 TABLET, FILM COATED ORAL DAILY
Status: DISCONTINUED | OUTPATIENT
Start: 2023-08-10 | End: 2023-08-14

## 2023-08-09 RX ORDER — CALCIUM CARBONATE 500(1250)
500 TABLET ORAL DAILY
Status: DISCONTINUED | OUTPATIENT
Start: 2023-08-10 | End: 2023-08-24 | Stop reason: HOSPADM

## 2023-08-09 RX ORDER — PROCHLORPERAZINE MALEATE 10 MG
10 TABLET ORAL EVERY 6 HOURS PRN
Status: DISCONTINUED | OUTPATIENT
Start: 2023-08-09 | End: 2023-08-24 | Stop reason: HOSPADM

## 2023-08-09 RX ORDER — M-VIT,TX,IRON,MINS/CALC/FOLIC 27MG-0.4MG
1 TABLET ORAL DAILY
Status: DISCONTINUED | OUTPATIENT
Start: 2023-08-10 | End: 2023-08-24 | Stop reason: HOSPADM

## 2023-08-09 RX ORDER — SODIUM CHLORIDE 0.9 % (FLUSH) 0.9 %
5-40 SYRINGE (ML) INJECTION EVERY 12 HOURS SCHEDULED
Status: DISCONTINUED | OUTPATIENT
Start: 2023-08-09 | End: 2023-08-24 | Stop reason: HOSPADM

## 2023-08-09 RX ORDER — POLYETHYLENE GLYCOL 3350 17 G/17G
17 POWDER, FOR SOLUTION ORAL DAILY PRN
Status: DISCONTINUED | OUTPATIENT
Start: 2023-08-09 | End: 2023-08-11

## 2023-08-09 RX ORDER — MORPHINE SULFATE 15 MG/1
15 TABLET, FILM COATED, EXTENDED RELEASE ORAL 2 TIMES DAILY
Status: DISCONTINUED | OUTPATIENT
Start: 2023-08-09 | End: 2023-08-24 | Stop reason: HOSPADM

## 2023-08-09 RX ORDER — VITAMIN B COMPLEX
1 TABLET ORAL DAILY
Status: DISCONTINUED | OUTPATIENT
Start: 2023-08-10 | End: 2023-08-24 | Stop reason: HOSPADM

## 2023-08-09 RX ADMIN — VANCOMYCIN HYDROCHLORIDE 1250 MG: 10 INJECTION, POWDER, LYOPHILIZED, FOR SOLUTION INTRAVENOUS at 20:14

## 2023-08-09 RX ADMIN — CEFEPIME 2000 MG: 2 INJECTION, POWDER, FOR SOLUTION INTRAVENOUS at 19:32

## 2023-08-09 ASSESSMENT — PAIN SCALES - GENERAL
PAINLEVEL_OUTOF10: 2
PAINLEVEL_OUTOF10: 0

## 2023-08-09 ASSESSMENT — PAIN DESCRIPTION - DESCRIPTORS: DESCRIPTORS: SORE

## 2023-08-09 ASSESSMENT — PAIN - FUNCTIONAL ASSESSMENT: PAIN_FUNCTIONAL_ASSESSMENT: 0-10

## 2023-08-09 NOTE — H&P
No treatment until 10/22. Follow up PET/CT 11/2022 showed widespread osseous metastatic disease. Received radiation again from 11/30 -12/2022. Was started on Eligard and Abiraterone Acetate 12/2022. MRI 4/2023 showed bulky mets at T1,T2, T4, T11, T12,L1. CT C/A/P 5/23 showed widespread liver mets with numerous large lytic lesions extending into epidural space at L1, L2, L5 and S1. Received radiation from 5/22-6/6/2023. He is s/p cycle # 1 carboplatin/ Etoposide 6/19/23. According to patient, he had another cycle of chemo 2 weeks ago. - cont home pain med morphine 15mg BID     # lactic acidosis likely secondary to hypoxemia in the setting of anemia   - resolved after NS    # Failure to thrive  Low albumin, BMI of 17.77 kg/m2. Patient is chronically ill  - dietary supplements added to meal     # A- flutter/ A fib   [id number] Score for Atrial Fibrillation Stroke Risk   Risk   Factors  Component Value   C CHF No 1   H HTN No 0   A2 Age >= 76 Yes,  (67 y.o.) 2   D DM No 0   S2 Prior Stroke/TIA No 0   V Vascular Disease No 0   A Age 77-78 No,  (67 y.o.) 0   Sc Sex male 0    [id number]  Score  3   Score last updated 8/10/23 1:85 AM EDT    Click here for a link to the UpToDate guideline \"Atrial Fibrillation: Anticoagulation therapy to prevent embolization    Disclaimer: Risk Score calculation is dependent on accuracy of patient problem list and past encounter diagnosis. [id number] 3. TSH 1.32 (7.12.2023). Keep K+ > 4.0 and Mg > 2.0  - cont home med metoprolol 50mg QD,amiodarone 200 mg QD - TSH, LFTs stable    # Hyponatremia  Baseline sodium 128-133, currently at his baseline   - daily BMP    # Aortic regurgitation    Will discuss with attending physician Dr. Issac Rodriguez    Code Status:Limited x1   FEN: regular full diet   PPX: SCD  DISPO: Taylor Hernandez MD  8/9/2023,  7:54 PM     Addendum to Resident H& P/Progress note:  I have personally seen,examined and evaluated the patient.  I have reviewed

## 2023-08-09 NOTE — CONSULTS
Clinical Pharmacy Progress Note    Vancomycin - Management by Pharmacy    Consult Date: 08/09/2023   Consulting Provider: Dr. Lisa Pelayo    A vancomycin loading dose of 1250mg x1 (~ 25mg/kg) has been ordered for the patient. If vancomycin is desired to continue on admission, a new consult must be placed for pharmacy to continue dosing.      Thank you for consulting pharmacy,    Lucille Robb, PharmD, [facility]

## 2023-08-10 PROBLEM — E87.20 LACTIC ACIDOSIS: Status: ACTIVE | Noted: 2023-08-10

## 2023-08-10 PROBLEM — E43 SEVERE MALNUTRITION (HCC): Chronic | Status: ACTIVE | Noted: 2023-01-01

## 2023-08-10 LAB
ANION GAP SERPL CALCULATED.3IONS-SCNC: 8 MMOL/L (ref 3–16)
BLOOD BANK DISPENSE STATUS: NORMAL
BLOOD BANK PRODUCT CODE: NORMAL
BPU ID: NORMAL
BUN SERPL-MCNC: 25 MG/DL (ref 7–20)
CALCIUM SERPL-MCNC: 7.9 MG/DL (ref 8.3–10.6)
CHLORIDE SERPL-SCNC: 101 MMOL/L (ref 99–110)
CO2 SERPL-SCNC: 24 MMOL/L (ref 21–32)
CREAT SERPL-MCNC: 0.6 MG/DL (ref 0.8–1.3)
DEPRECATED RDW RBC AUTO: 20.1 % (ref 12.4–15.4)
DESCRIPTION BLOOD BANK: NORMAL
GFR SERPLBLD CREATININE-BSD FMLA CKD-EPI: >60 ML/MIN/{1.73_M2}
GLUCOSE SERPL-MCNC: 94 MG/DL (ref 70–99)
HAPTOGLOB SERPL-MCNC: 254 MG/DL (ref 30–200)
HCT VFR BLD AUTO: 20 % (ref 40.5–52.5)
HCT VFR BLD AUTO: 22.1 % (ref 40.5–52.5)
HCT VFR BLD AUTO: 22.4 % (ref 40.5–52.5)
HCT VFR BLD AUTO: 22.9 % (ref 40.5–52.5)
HCT VFR BLD AUTO: 24.9 % (ref 40.5–52.5)
HCT VFR BLD AUTO: 25.6 % (ref 40.5–52.5)
HGB BLD-MCNC: 6.7 G/DL (ref 13.5–17.5)
HGB BLD-MCNC: 7.4 G/DL (ref 13.5–17.5)
HGB BLD-MCNC: 7.6 G/DL (ref 13.5–17.5)
HGB BLD-MCNC: 8.2 G/DL (ref 13.5–17.5)
HGB BLD-MCNC: 8.4 G/DL (ref 13.5–17.5)
IMMATURE RETIC FRACT: 0.59 (ref 0.21–0.37)
LDH SERPL L TO P-CCNC: 194 U/L (ref 100–190)
MAGNESIUM SERPL-MCNC: 1.7 MG/DL (ref 1.8–2.4)
MCH RBC QN AUTO: 28.8 PG (ref 26–34)
MCHC RBC AUTO-ENTMCNC: 32.9 G/DL (ref 31–36)
MCV RBC AUTO: 87.7 FL (ref 80–100)
PATH INTERP BLD-IMP: NORMAL
PLATELET # BLD AUTO: 12 K/UL (ref 135–450)
PMV BLD AUTO: 9.4 FL (ref 5–10.5)
POTASSIUM SERPL-SCNC: 4.3 MMOL/L (ref 3.5–5.1)
RBC # BLD AUTO: 2.92 M/UL (ref 4.2–5.9)
RETICS # AUTO: 0.01 M/UL
RETICS/RBC NFR AUTO: 0.55 % (ref 0.5–2.18)
SODIUM SERPL-SCNC: 133 MMOL/L (ref 136–145)
WBC # BLD AUTO: 0.1 K/UL (ref 4–11)

## 2023-08-10 PROCEDURE — 6360000002 HC RX W HCPCS

## 2023-08-10 PROCEDURE — 2580000003 HC RX 258

## 2023-08-10 PROCEDURE — 99223 1ST HOSP IP/OBS HIGH 75: CPT | Performed by: HOSPITALIST

## 2023-08-10 PROCEDURE — 85025 COMPLETE CBC W/AUTO DIFF WBC: CPT

## 2023-08-10 PROCEDURE — 6360000002 HC RX W HCPCS: Performed by: HOSPITALIST

## 2023-08-10 PROCEDURE — 6370000000 HC RX 637 (ALT 250 FOR IP)

## 2023-08-10 PROCEDURE — 36415 COLL VENOUS BLD VENIPUNCTURE: CPT

## 2023-08-10 PROCEDURE — 2060000000 HC ICU INTERMEDIATE R&B

## 2023-08-10 PROCEDURE — 80048 BASIC METABOLIC PNL TOTAL CA: CPT

## 2023-08-10 PROCEDURE — 83735 ASSAY OF MAGNESIUM: CPT

## 2023-08-10 PROCEDURE — 85014 HEMATOCRIT: CPT

## 2023-08-10 PROCEDURE — 85018 HEMOGLOBIN: CPT

## 2023-08-10 PROCEDURE — 36430 TRANSFUSION BLD/BLD COMPNT: CPT

## 2023-08-10 RX ORDER — HEPARIN 100 UNIT/ML
500 SYRINGE INTRAVENOUS PRN
Status: DISCONTINUED | OUTPATIENT
Start: 2023-08-10 | End: 2023-08-24 | Stop reason: HOSPADM

## 2023-08-10 RX ORDER — SODIUM CHLORIDE 9 MG/ML
INJECTION, SOLUTION INTRAVENOUS PRN
Status: DISCONTINUED | OUTPATIENT
Start: 2023-08-10 | End: 2023-08-24 | Stop reason: HOSPADM

## 2023-08-10 RX ORDER — MAGNESIUM SULFATE IN WATER 40 MG/ML
2000 INJECTION, SOLUTION INTRAVENOUS ONCE
Status: COMPLETED | OUTPATIENT
Start: 2023-08-10 | End: 2023-08-10

## 2023-08-10 RX ADMIN — MORPHINE SULFATE 15 MG: 15 TABLET, FILM COATED, EXTENDED RELEASE ORAL at 00:34

## 2023-08-10 RX ADMIN — CEFEPIME 2000 MG: 2 INJECTION, POWDER, FOR SOLUTION INTRAVENOUS at 18:53

## 2023-08-10 RX ADMIN — CEFEPIME 2000 MG: 2 INJECTION, POWDER, FOR SOLUTION INTRAVENOUS at 12:52

## 2023-08-10 RX ADMIN — ACETAMINOPHEN 650 MG: 325 TABLET ORAL at 02:41

## 2023-08-10 RX ADMIN — SODIUM CHLORIDE, PRESERVATIVE FREE 10 ML: 5 INJECTION INTRAVENOUS at 00:35

## 2023-08-10 RX ADMIN — PANTOPRAZOLE SODIUM 40 MG: 20 TABLET, DELAYED RELEASE ORAL at 06:35

## 2023-08-10 RX ADMIN — MAGNESIUM SULFATE HEPTAHYDRATE 2000 MG: 40 INJECTION, SOLUTION INTRAVENOUS at 16:00

## 2023-08-10 RX ADMIN — VANCOMYCIN HYDROCHLORIDE 750 MG: 10 INJECTION, POWDER, LYOPHILIZED, FOR SOLUTION INTRAVENOUS at 10:50

## 2023-08-10 RX ADMIN — HEPARIN 500 UNITS: 100 SYRINGE at 12:45

## 2023-08-10 RX ADMIN — SENNOSIDES 8.6 MG: 8.6 TABLET, FILM COATED ORAL at 09:42

## 2023-08-10 RX ADMIN — MULTIPLE VITAMINS W/ MINERALS TAB 1 TABLET: TAB at 09:42

## 2023-08-10 RX ADMIN — MORPHINE SULFATE 15 MG: 15 TABLET, FILM COATED, EXTENDED RELEASE ORAL at 09:42

## 2023-08-10 RX ADMIN — FILGRASTIM-AAFI 480 MCG: 480 INJECTION, SOLUTION SUBCUTANEOUS at 10:43

## 2023-08-10 RX ADMIN — Medication 1000 UNITS: at 09:42

## 2023-08-10 RX ADMIN — MORPHINE SULFATE 15 MG: 15 TABLET, FILM COATED, EXTENDED RELEASE ORAL at 20:58

## 2023-08-10 RX ADMIN — SODIUM CHLORIDE: 9 INJECTION, SOLUTION INTRAVENOUS at 00:36

## 2023-08-10 RX ADMIN — DEXAMETHASONE 4 MG: 4 TABLET ORAL at 10:43

## 2023-08-10 RX ADMIN — LATANOPROST 1 DROP: 50 SOLUTION OPHTHALMIC at 20:58

## 2023-08-10 RX ADMIN — CALCIUM 500 MG: 500 TABLET ORAL at 09:41

## 2023-08-10 RX ADMIN — VANCOMYCIN HYDROCHLORIDE 750 MG: 10 INJECTION, POWDER, LYOPHILIZED, FOR SOLUTION INTRAVENOUS at 20:59

## 2023-08-10 RX ADMIN — CEFEPIME 2000 MG: 2 INJECTION, POWDER, FOR SOLUTION INTRAVENOUS at 02:46

## 2023-08-10 ASSESSMENT — PAIN DESCRIPTION - FREQUENCY: FREQUENCY: CONTINUOUS

## 2023-08-10 ASSESSMENT — ENCOUNTER SYMPTOMS
EYES NEGATIVE: 1
VOMITING: 0
DIARRHEA: 1
APNEA: 0
SHORTNESS OF BREATH: 1
COUGH: 0
NAUSEA: 0
ABDOMINAL DISTENTION: 0

## 2023-08-10 ASSESSMENT — PAIN DESCRIPTION - PAIN TYPE: TYPE: ACUTE PAIN

## 2023-08-10 ASSESSMENT — PAIN - FUNCTIONAL ASSESSMENT: PAIN_FUNCTIONAL_ASSESSMENT: ACTIVITIES ARE NOT PREVENTED

## 2023-08-10 ASSESSMENT — PAIN DESCRIPTION - DESCRIPTORS
DESCRIPTORS: ACHING
DESCRIPTORS: ACHING

## 2023-08-10 ASSESSMENT — PAIN DESCRIPTION - LOCATION
LOCATION: BUTTOCKS
LOCATION: PENIS

## 2023-08-10 ASSESSMENT — PAIN SCALES - GENERAL
PAINLEVEL_OUTOF10: 0
PAINLEVEL_OUTOF10: 2
PAINLEVEL_OUTOF10: 1

## 2023-08-10 ASSESSMENT — PAIN DESCRIPTION - ONSET: ONSET: ON-GOING

## 2023-08-10 ASSESSMENT — PAIN DESCRIPTION - ORIENTATION: ORIENTATION: MID

## 2023-08-10 NOTE — CARE COORDINATION
Case Management Assessment  Initial Evaluation    Date/Time of Evaluation: 8/10/2023 3:33 PM  Assessment Completed by: Jamal Aguero    If patient is discharged prior to next notation, then this note serves as note for discharge by case management. Patient Name: Brett Gaspar                   YOB: 1950  Diagnosis: Pancytopenia ([address]) [D61.818]  Acute anemia [D64.9]                   Date / Time: 8/9/2023  4:32 PM    Patient Admission Status: Inpatient   Readmission Risk (Low < 19, Mod (19-27), High > 27): Readmission Risk Score: 29.1    Current PCP: Srinath Aldana MD  PCP verified by CM? Yes    Chart Reviewed: Yes      History Provided by: Patient, Friend, Medical Record  Patient Orientation: Alert and Oriented    Patient Cognition:      Hospitalization in the last 30 days (Readmission):  Yes    If yes, Readmission Assessment in  Navigator will be completed. Advance Directives:      Code Status: Limited   Patient's Primary Decision Maker is: Patient Declined (Legal Next of Kin Remains as Decision Maker) (friend, Kae Bianchi has POA)    Primary Decision Maker: Jacobo Mata Brother/Sister - [phone]    Discharge Planning:    Patient lives with: Other (Comment) (facility) Type of Home: [address] [facility]  Primary Care Giver:  Other (Comment) [facility])  Patient Support Systems include: Friends/Neighbors   Current Financial resources: Jabari Roy  Current community resources: None  Current services prior to admission: [facility]            Current DME:              Type of Home Care services:  None    ADLS  Prior functional level: Assistance with the following:, Bathing, Shopping, Housework, Cooking, Feeding, Toileting, Dressing, Mobility  Current functional level: Assistance with the following:, Bathing, Dressing, Toileting, Feeding, Cooking, Housework, Shopping, Mobility    PT AM-PAC:   /24  OT AM-PAC:   /24    Family can provide assistance at DC: No  Would you like Case Management to discuss the discharge plan with any other family members/significant others, and if so, who? Yes (friends, Tee Overton and Giuliana Cowan)  Plans to Return to Present Housing: No (requests change)  Other Identified Issues/Barriers to RETURNING to current housing: wants change in facility  Potential Assistance needed at discharge: Other (Comment) (new placement)            Potential DME:    Patient expects to discharge to: [address] [place] [address] for transportation at discharge:      Financial    Payor: Trey Morales / Plan: Annemarie Yury ESSENTIAL/PLUS / Product Type: *No Product type* /     Does insurance require precert for SNF: Yes    Potential assistance Purchasing Medications: No  Meds-to-Beds request: No      Kunal Finley [id number] - [address]  [address] [place] [address],[address] [address]  Phone: [phone] Fax: [phone]      Notes:    Factors facilitating achievement of predicted outcomes: Friend support    Barriers to discharge: Medical complications    Additional Case Management Notes: Patient is from [facility]. He does not want to go back due to poor care (pressure ulcer occurred, staff rude, etc). This CM spoke with POA Tee Aideeanais and friend Giuliana Cowan in person and had an extensive talk about change in facilities and SNF vs LTC. They are looking into getting pt someone as LTC; I gave them a list specific to his insurance. I also answered questions they have about palliative care vs hospice. They are interested in palliative care, specifically HOC. They are wanting to look over list and meet with CM team tomorrow morning. Spiritual care also consulted due to pt wanting to change JULI to Eligiodonaldojohana Carmona due to her background in nursing.     The Plan for Transition of Care is related to the following treatment goals of Pancytopenia ([address]) [D61.818]  Acute anemia [D55.6]    IF APPLICABLE: The Patient and/or patient representative

## 2023-08-10 NOTE — PROGRESS NOTES
Consulted for bilateral buttocks - Stage 3. No photo or measurements taken at this time. Wound Care to follow up tomorrow. Triad applied to buttocks. Pt on specialty mattress. Removed wraps on bilateral lower extremities. Pt has multiple red areas on bilateral lower extremities. Will re-evaluate tomorrow.

## 2023-08-10 NOTE — ED NOTES
ED TO INPATIENT SBAR HANDOFF    Patient Name: Law Griffiths   :  1950  74 y.o. MRN:  [id number]  Preferred Name  Ralf Hamm  ED Room #:  G20/I18-66  Family/Caregiver Present no   Restraints no   Sitter no   Sepsis Risk Score Sepsis Risk Score: 2.44    Situation  Code Status: Prior No additional code details. Allergies: Patient has no known allergies. Weight: Patient Vitals for the past 96 hrs (Last 3 readings):   Weight   23 1642 120 lb 4.8 oz (54.6 kg)     Arrived from: nursing home  Chief Complaint:   Chief Complaint   Patient presents with    Abnormal Lab     Low hgb, pt states he does not have any symptoms of low hgb but also had to have transfusion a few wks ago, HBG 5.7 taken today     Hospital Problem/Diagnosis:  Principal Problem:    Acute anemia  Resolved Problems:    * No resolved hospital problems. *    Imaging:   XR CHEST (2 VW)   Final Result      Left basilar infiltrate, likely pneumonia.       Electronically signed by Sonny Avalos MD        Abnormal labs:   Abnormal Labs Reviewed   CBC WITH AUTO DIFFERENTIAL - Abnormal; Notable for the following components:       Result Value    WBC 0.1 (*)     RBC 1.99 (*)     Hemoglobin 5.9 (*)     Hematocrit 17.7 (*)     RDW 22.0 (*)     Platelets 17 (*)     Anisocytosis 1+ (*)     Macrocytes 1+ (*)     Ovalocytes Occasional (*)     Tear Drop Cells Occasional (*)     All other components within normal limits    Narrative:     Arlyn Tenorio tel. [phone],  Hematology results called to and read back by [address] [place] [address],[facility] danielle, 2023  17:40, by [facility]  Hematology results called to and read back by anthony santos, 2023  17:08, by BYEST   BASIC METABOLIC PANEL W/ REFLEX TO MG FOR LOW K - Abnormal; Notable for the following components:    Sodium 132 (*)     Chloride 98 (*)     Glucose 145 (*)     BUN 28 (*)     Creatinine 0.7 (*)     Calcium 7.9 (*)     All other components within normal limits   HEPATIC FUNCTION PANEL - Abnormal; Notable for the

## 2023-08-10 NOTE — PROGRESS NOTES
4 Eyes Skin Assessment     NAME:  Hima Marroquin OF BIRTH:  1950  MEDICAL RECORD NUMBER:  [id number]    The patient is being assessed for  Transfer to New Unit    I agree that at least one RN has performed a thorough Head to Toe Skin Assessment on the patient. ALL assessment sites listed below have been assessed. Areas assessed by both nurses: Lesly and [facility], Face, Ears, Shoulders, Back, Chest, Arms, Elbows, Hands, Sacrum. Buttock, Coccyx, Ischium, Legs. Feet and Heels, and Under Medical Devices         Does the Patient have a Wound? Yes wound(s) were present on assessment.  LDA wound assessment was Initiated and completed by RN       Reddy Prevention initiated by RN: Yes  Wound Care Orders initiated by RN: Yes    Pressure Injury (Stage 3,4, Unstageable, DTI, NWPT, and Complex wounds) if present, place Wound referral order by RN under : Yes    New Ostomies, if present place, Ostomy referral order under : No     Nurse 1 eSignature: Electronically signed by Michelle Fernández RN on 8/10/23 at 7:01 PM EDT    **SHARE this note so that the co-signing nurse can place an eSignature**    Nurse 2 eSignature: Electronically signed by Milan Ba RN on 8/10/23 at 7:03 PM EDT

## 2023-08-10 NOTE — PROGRESS NOTES
Progress Note      Admit Date: 8/9/2023  Day: 2  Diet: ADULT DIET; Regular    CC: Abnormal labs    Interval history: Patient was seen at bedside this AM. He reports fatigue but no other acute symptoms. He had fever of 100.6F overnight and is on prophylactic Vanc and Cefepime. He claims his transfusion was \"not working\". Unclear meaning. HPI: Rush Le is a 74 y. o.male with a PMHx of relapsed metastatic prostate cancer, A-flutter, CHF, CKD, GERD presented from [facility] after abnormal hemoglobin level of 5.7. He was transferred here for blood transfusion. Patient has no complaints but a bit frustrated that he had to come in to get transfusion. He follows oncologist Dr. Aviva Pérez [facility]). Patient states that he had his chemo 2 weeks ago and the next one is due in a week. Denies any fever, chills, CP, SOB at rest, N/V, lightheadedness, dizziness, abdominal pain, constipation, BRBPR, dark stool or any urinary changes but endorsees SOB with exertion and diarrhea after chemo. Of note, patient was recently discharged on 7/15, presented with similar labs and SOB. Was treated for sepsis secondary to pneumonia and was transfused 1 unit of blood. On arrival to the ED, patient was afebrile and hemodynamically stable, saturating well on room air. Labs were significant for elevated lactic acid of 2.3 and pancytopenic. Hyponatremic and hypoalbuminemia.         Medications:     Scheduled Meds:   vancomycin  750 mg IntraVENous Q12H    filgrastim/filgrastim biosimilar  480 mcg SubCUTAneous Daily    morphine  15 mg Oral BID    pantoprazole  40 mg Oral QAM AC    latanoprost  1 drop Right Eye Nightly    dexamethasone  4 mg Oral Daily    metoprolol succinate  50 mg Oral Daily    senna  1 tablet Oral Daily    Vitamin D  1 tablet Oral Daily    calcium elemental  500 mg Oral Daily    therapeutic multivitamin-minerals  1 tablet Oral Daily    sodium chloride flush  5-40 mL IntraVENous 2 times per day    cefepime  2,000 mg

## 2023-08-10 NOTE — CONSULTS
Oncology Hematology Care    Consult Note      Requesting Physician:  Dr. Reij Shaver:  abnormal labs       HISTORY OF PRESENT ILLNESS:    Mr. Bo Monsivais  is a 74 y.o. male we are seeing in consultation for Prostate Cancer. He is well known to our service as he has been admitted 3x since June. He is followed by Dr. Nickolas Phelps and is being treated with Carbo/Etoposide. He is unable to maintain his blood counts following his treatments and then needs admission. Today he denies any S&S of infection. He feels that he is tolerating his chemotherapy well. Denies N&V, trouble with his bowels. C/o fatigue.      Past Medical History:  Past Medical History:   Diagnosis Date    Cancer [facility]) 2014    prostate Dr Irineo Davenport s/p rads with cyber knife     CHF (congestive heart failure) ([address] [facility])     Dupuytrens contracture     GERD (gastroesophageal reflux disease)     Nocturia     Reflux esophagitis        Past Surgical History:  Past Surgical History:   Procedure Laterality Date    COLONOSCOPY  2000 & 2013    Dr Ulysses Lares repeat 2018    COLONOSCOPY  02/08/2018    Dr. Colletta Scotland polyp removed0- repeat 2022    CT NEEDLE BIOPSY LIVER PERCUTANEOUS  6/2/2023    CT NEEDLE BIOPSY LIVER PERCUTANEOUS 6/2/2023 [facility] CT SCAN    ENDOSCOPY, COLON, DIAGNOSTIC      EYE SURGERY  1999    detached retina bilat     FINGER TRIGGER RELEASE Left 08/13/2013    middle finger    PORT SURGERY Right 6/14/2023    PORT PLACEMENT, RIGHT INTERNAL JUGULAR performed by Reece Aceves MD at [address] [facility]  10/2013    mult Bx  1 +Bx -s/p cyber knife 9/2014    TONSILLECTOMY         Current Medications:  Current Facility-Administered Medications   Medication Dose Route Frequency Provider Last Rate Last Admin    0.9 % sodium chloride infusion   IntraVENous PRN Natasha Arias MD        vancomycin (VANCOCIN) 750 mg in sodium

## 2023-08-10 NOTE — PROGRESS NOTES
Reason for admission/POD: Acute Anemia  Incision/wound care: Yes  Skin: Stage 2 R buttocks and coccyx  Ambulation:     ____to be determined Assist   ____Device  How pt voids: urinal  Medication administration: Pills www  IV/IVF: yes  Accu check: no  Nurse or lab draw: RN  Camera: no  Consults: no  Telemetry: yes  Last pain medication: 0034  Daily weight: no

## 2023-08-10 NOTE — PROGRESS NOTES
08/10/23 1537   Encounter Summary   Encounter Overview/Reason  Initial Encounter   Service Provided For: Patient   Referral/Consult From: Nurse Lynda Santana RN)   Last Encounter  08/10/23  (dejuan)   Complexity of Encounter High   Begin Time 1420   End Time  1430   Total Time Calculated 10 min   Advance Care Planning   Type ACP conversation   Assessment/Intervention/Outcome   Assessment Calm; Hopeful   Intervention Active listening;Explored/Affirmed feelings, thoughts, concerns   Outcome Comfort;Engaged in conversation;Expressed feelings, needs, and concerns;Expressed Gratitude     PT states that he has a HCPOA.      Staff Claire Basurto MA, [facility]

## 2023-08-10 NOTE — PROGRESS NOTES
Report called to [facility] on [address] [facility]. Patient made aware of room transfer. All belongings sent with patient. No other needs at this time.     Electronically signed by Preet Collins RN on 8/10/2023 at 4:29 PM

## 2023-08-10 NOTE — PROGRESS NOTES
VSS, A&O, pt received 2 units of blood and tolerated well. IVF and IV abx infusing. Pt had multiple BM at the beginning of this shift, used urinal for voiding. Fall precaution in place, call light used for needs.

## 2023-08-10 NOTE — CARE COORDINATION
08/10/23 1531   Readmission Assessment   Number of Days since last admission? 8-30 days   Previous Disposition SNF   Who is being Interviewed Patient   What was the patient's/caregiver's perception as to why they think they needed to return back to the hospital? Other (Comment)  (abnormal labs)   Did you visit your Primary Care Physician after you left the hospital, before you returned this time? No   Why weren't you able to visit your PCP? Other (Comment)  (SNF)   Did you see a specialist, such as Cardiac, Pulmonary, Orthopedic Physician, etc. after you left the hospital? Yes   Who advised the patient to return to the hospital? Physician   Does the patient report anything that got in the way of taking their medications? No   In our efforts to provide the best possible care to you and others like you, can you think of anything that we could have done to help you after you left the hospital the first time, so that you might not have needed to return so soon?  Other (Comment)  (nothing could have been done to prevent)

## 2023-08-10 NOTE — PROGRESS NOTES
Comprehensive Nutrition Assessment    RECOMMENDATIONS:  PO Diet: Continue Regular  ONS: Begin Ensure +HP tid(straw)  Nutrition Education: No recommendation at this time       NUTRITION ASSESSMENT:   Nutritional summary & status: Positive IP related to BMI <18. Pt with relapsed prostate cancer. Stated that his next chemo txt is next week. Denies n/v/d at present, however, endorses poor appetite. Significant weight loss of 20% in past 10 months. Noted subcutaneous/muscle mass loss on observation. Severe malnutrition per ASPEN criteria. Agreeable to receive Ensure +HP supplements  tid for increased kcal/pro. Prefers Strawberry flavor. Will monitor adequacy of po intake and ONS acceptance.    Admission/PMH: Acute anemia//Prostate Cancer, GERD, CKD, CHF    MALNUTRITION ASSESSMENT  Context of Malnutrition: Chronic Illness   Malnutrition Status: Severe malnutrition  Findings of the 6 clinical characteristics of malnutrition (Minimum of 2 out of 6 clinical characteristics is required to make the diagnosis of moderate or severe Protein Calorie Malnutrition based on AND/ASPEN Guidelines):  Energy Intake:  75% or less estimated energy requirements for 1 month or longer  Weight Loss:  20% over 1 year     Body Fat Loss:  Severe body fat loss Orbital, Triceps, Buccal region   Muscle Mass Loss:  Severe muscle mass loss Temples (temporalis), Clavicles (pectoralis & deltoids), Thigh (quadraceps), Calf (gastrocnemius)  Fluid Accumulation:  No significant fluid accumulation     Strength:  Not Performed    NUTRITION DIAGNOSIS   Inadequate oral intake related to catabolic illness, inadequate protein-energy intake as evidenced by Criteria as identified in malnutrition assessment    Nutrition Monitoring and Evaluation:   Food/Nutrient Intake Outcomes:  Food and Nutrient Intake, Supplement Intake  Physical Signs/Symptoms Outcomes:  Biochemical Data     OBJECTIVE DATA: Significant to nutrition assessment  Nutrition Related Findings: No edema. +BM 8/09. Na 133 Mg 1.7. Wounds: None  Nutrition Goals: PO intake 75% or greater, prior to discharge     [address] DIET; Regular  ADULT ORAL NUTRITION SUPPLEMENT; Breakfast, Lunch, Dinner; Standard High Calorie/High Protein Oral Supplement  PO Intake: Unable to assess (no data recorded)   PO Supplement Intake:Unable to assess  Additional Sources of Calories/IVF:n/a     COMPARATIVE STANDARDS  Energy (kcal):  8346-1797 (30-35 kcal/CBW)     Protein (g):  65-81 (1.2-1.5 gm/CBW)       Fluid (ml/day):  1 ml/kcal or per MD    ANTHROPOMETRICS  Current Height: 5' 9\" (175.3 cm)  Current Weight - Scale: 120 lb 4.8 oz (54.6 kg)    Admission weight: 120 lb 4.8 oz (54.6 kg)    The patient will be monitored per nutrition standards of care. Consult dietitian if additional nutrition interventions are needed prior to RD reassessment.      Mary Diaz, [address] [address]:  [id number]  Office:  [phone]

## 2023-08-10 NOTE — PROGRESS NOTES
4 Eyes Skin Assessment     NAME:  Brenton Quintero OF BIRTH:  1950  MEDICAL RECORD NUMBER:  [id number]    The patient is being assessed for  Admission    I agree that at least one RN has performed a thorough Head to Toe Skin Assessment on the patient. ALL assessment sites listed below have been assessed. Areas assessed by both nurses:    Head, Face, Ears, Shoulders, Back, Chest, Arms, Elbows, Hands, Sacrum. Buttock, Coccyx, Ischium, and Legs. Feet and Heels        Does the Patient have a Wound? Yes wound(s) were present on assessment.  LDA wound assessment was Initiated and completed by RN     Pressure injury Stage 2 on R buttocks, coccyx    Reddy Prevention initiated by RN: Yes  Wound Care Orders initiated by RN: Yes    Pressure Injury (Stage 3,4, Unstageable, DTI, NWPT, and Complex wounds) if present, place Wound referral order by RN under : No    New Ostomies, if present place, Ostomy referral order under : No     Nurse 1 eSignature: Electronically signed by Jan Sung RN on 8/10/23 at 4:43 AM EDT    **SHARE this note so that the co-signing nurse can place an eSignature**    Nurse 2 eSignature: Electronically signed by Augustina Rodriguez RN on 8/10/23 at 8:26 AM EDT

## 2023-08-10 NOTE — ACP (ADVANCE CARE PLANNING)
Advance Care Planning     Advance Care Planning Inpatient Note  [facility] Department    Today's Date: 8/10/2023  Unit: [place] [address] [address]    Received request from [employer]. Upon review of chart and communication with care team, patient's decision making abilities are not in question. . Patient was/were present in the room during visit. Goals of ACP Conversation:  Discuss advance care planning documents    Health Care Decision Makers:       Primary Decision Maker: Morales Camacho - Brother/Sister - [phone]  Summary:  Documented Next of Kin, per patient report    Advance Care Planning Documents (Patient Wishes):  Healthcare Power of /Advance Directive Appointment of Health Care Agent     Assessment:  PT states he has a [place]-HCPOA but 'She is not here right now\"   He also seemed to be resting / sleep?      Interventions:  Requested patient/family to submit existing document for our records: None    Care Preferences Communicated:   No    Outcomes/Plan:  ACP Discussion: Refused    Electronically signed by Chaplain Ramon on 8/10/2023 at 3:43 PM

## 2023-08-10 NOTE — PROGRESS NOTES
Clinical Pharmacy Progress Note    Vancomycin - Management by Pharmacy    Consult Date(s): 08/10/2023  Consulting Provider(s): Dr. Stephanie Mora / Plan  Febrile neutropenia- Vancomycin  Concurrent Antimicrobials: cefepime  Day of Vanc Therapy / Ordered Duration: 1  Current Dosing Method: Bayesian-Guided AUC Dosing  Therapeutic Goal: -600 mg/L*hr  Current Dose / Plan:   Vancomycin 750 mg IV q 12 hr  Estimated  mg/L*hr  Estimated trough 15.9 mg/L  Will continue to monitor clinical condition and make adjustments to regimen as appropriate. Thank you for consulting pharmacy,    Shreyas Hauser, [facility], PharmD, [address] [facility]  8/10/2023 6:47 AM       Subjective/Objective:   Bassam Mace is a 74 y.o. male with a PMHx significant for relapsed metastatic prostate cancer, A-flutter, CHF, CKD, GERD presented from [facility] after abnormal hemoglobin level of 5. 7. who is admitted with abnormal labs. Pharmacy is consulted to vancomycin dosing. Ht Readings from Last 1 Encounters:   08/09/23 5' 9\" (1.753 m)     Wt Readings from Last 1 Encounters:   08/09/23 120 lb 4.8 oz (54.6 kg)     Current & Prior Antimicrobial Regimen(s):  Cefepime 2000 mg IV x 1- complete  Cefepime 2000 mg IV q 8 hr- current    Vancomycin Level(s) / Doses:    Date Time Dose Type of Level / Level Interpretation                 Note: Serum levels collected for AUC-based dosing may be high if collected in close proximity to the dose administered. This is not necessarily indicative of toxicity. Cultures & Sensitivities:    Date Site Micro Susceptibility / Result                 Recent Labs     08/09/23  1644 08/10/23  0602   CREATININE 0.7* 0.6*   BUN 28* 25*   WBC 0.1* 0.1*       Estimated Creatinine Clearance: 82 mL/min (A) (based on SCr of 0.6 mg/dL (L)). Additional Lab Values / Findings of Note:    No results for input(s): PROCAL in the last 72 hours.

## 2023-08-11 ENCOUNTER — APPOINTMENT (OUTPATIENT)
Dept: GENERAL RADIOLOGY | Age: 73
End: 2023-08-11
Payer: MEDICARE

## 2023-08-11 LAB
ANION GAP SERPL CALCULATED.3IONS-SCNC: 11 MMOL/L (ref 3–16)
BACTERIA URNS QL MICRO: ABNORMAL /HPF
BILIRUB UR QL STRIP.AUTO: NEGATIVE
BLOOD BANK DISPENSE STATUS: NORMAL
BLOOD BANK PRODUCT CODE: NORMAL
BPU ID: NORMAL
BUN SERPL-MCNC: 23 MG/DL (ref 7–20)
CALCIUM SERPL-MCNC: 7.4 MG/DL (ref 8.3–10.6)
CHLORIDE SERPL-SCNC: 101 MMOL/L (ref 99–110)
CLARITY UR: CLEAR
CO2 SERPL-SCNC: 21 MMOL/L (ref 21–32)
COLOR UR: YELLOW
CREAT SERPL-MCNC: 0.6 MG/DL (ref 0.8–1.3)
DEPRECATED RDW RBC AUTO: 20.4 % (ref 12.4–15.4)
DESCRIPTION BLOOD BANK: NORMAL
EKG ATRIAL RATE: 326 BPM
EKG DIAGNOSIS: NORMAL
EKG Q-T INTERVAL: 288 MS
EKG QRS DURATION: 78 MS
EKG QTC CALCULATION (BAZETT): 464 MS
EKG R AXIS: 49 DEGREES
EKG T AXIS: 98 DEGREES
EKG VENTRICULAR RATE: 156 BPM
GFR SERPLBLD CREATININE-BSD FMLA CKD-EPI: >60 ML/MIN/{1.73_M2}
GLUCOSE SERPL-MCNC: 111 MG/DL (ref 70–99)
GLUCOSE UR STRIP.AUTO-MCNC: NEGATIVE MG/DL
HCT VFR BLD AUTO: 21.2 % (ref 40.5–52.5)
HCT VFR BLD AUTO: 26.1 % (ref 40.5–52.5)
HGB BLD-MCNC: 7.2 G/DL (ref 13.5–17.5)
HGB BLD-MCNC: 8.6 G/DL (ref 13.5–17.5)
HGB UR QL STRIP.AUTO: ABNORMAL
KETONES UR STRIP.AUTO-MCNC: NEGATIVE MG/DL
LEUKOCYTE ESTERASE UR QL STRIP.AUTO: NEGATIVE
MAGNESIUM SERPL-MCNC: 2 MG/DL (ref 1.8–2.4)
MCH RBC QN AUTO: 28.7 PG (ref 26–34)
MCHC RBC AUTO-ENTMCNC: 33.8 G/DL (ref 31–36)
MCV RBC AUTO: 84.9 FL (ref 80–100)
MUCOUS THREADS #/AREA URNS LPF: ABNORMAL /LPF
NITRITE UR QL STRIP.AUTO: NEGATIVE
PH UR STRIP.AUTO: 6 [PH] (ref 5–8)
PLATELET # BLD AUTO: 10 K/UL (ref 135–450)
PMV BLD AUTO: 10.1 FL (ref 5–10.5)
POTASSIUM SERPL-SCNC: 3.7 MMOL/L (ref 3.5–5.1)
PROT UR STRIP.AUTO-MCNC: 100 MG/DL
RBC # BLD AUTO: 2.49 M/UL (ref 4.2–5.9)
RBC #/AREA URNS HPF: ABNORMAL /HPF (ref 0–4)
SODIUM SERPL-SCNC: 133 MMOL/L (ref 136–145)
SP GR UR STRIP.AUTO: 1.02 (ref 1–1.03)
UA DIPSTICK W REFLEX MICRO PNL UR: YES
URN SPEC COLLECT METH UR: ABNORMAL
UROBILINOGEN UR STRIP-ACNC: 0.2 E.U./DL
VANCOMYCIN SERPL-MCNC: 11.7 UG/ML
WBC # BLD AUTO: 0.1 K/UL (ref 4–11)
WBC #/AREA URNS HPF: ABNORMAL /HPF (ref 0–5)

## 2023-08-11 PROCEDURE — 85014 HEMATOCRIT: CPT

## 2023-08-11 PROCEDURE — 80048 BASIC METABOLIC PNL TOTAL CA: CPT

## 2023-08-11 PROCEDURE — 80202 ASSAY OF VANCOMYCIN: CPT

## 2023-08-11 PROCEDURE — 6370000000 HC RX 637 (ALT 250 FOR IP)

## 2023-08-11 PROCEDURE — 71045 X-RAY EXAM CHEST 1 VIEW: CPT

## 2023-08-11 PROCEDURE — 2060000000 HC ICU INTERMEDIATE R&B

## 2023-08-11 PROCEDURE — 97530 THERAPEUTIC ACTIVITIES: CPT

## 2023-08-11 PROCEDURE — 6360000002 HC RX W HCPCS

## 2023-08-11 PROCEDURE — 2580000003 HC RX 258

## 2023-08-11 PROCEDURE — 85018 HEMOGLOBIN: CPT

## 2023-08-11 PROCEDURE — 85025 COMPLETE CBC W/AUTO DIFF WBC: CPT

## 2023-08-11 PROCEDURE — 74018 RADEX ABDOMEN 1 VIEW: CPT

## 2023-08-11 PROCEDURE — 83735 ASSAY OF MAGNESIUM: CPT

## 2023-08-11 PROCEDURE — 2500000003 HC RX 250 WO HCPCS

## 2023-08-11 PROCEDURE — 87641 MR-STAPH DNA AMP PROBE: CPT

## 2023-08-11 PROCEDURE — 6360000002 HC RX W HCPCS: Performed by: HOSPITALIST

## 2023-08-11 PROCEDURE — 93010 ELECTROCARDIOGRAM REPORT: CPT | Performed by: INTERNAL MEDICINE

## 2023-08-11 PROCEDURE — 93005 ELECTROCARDIOGRAM TRACING: CPT

## 2023-08-11 PROCEDURE — 97167 OT EVAL HIGH COMPLEX 60 MIN: CPT

## 2023-08-11 PROCEDURE — 36430 TRANSFUSION BLD/BLD COMPNT: CPT

## 2023-08-11 PROCEDURE — 99233 SBSQ HOSP IP/OBS HIGH 50: CPT | Performed by: HOSPITALIST

## 2023-08-11 PROCEDURE — 97162 PT EVAL MOD COMPLEX 30 MIN: CPT

## 2023-08-11 PROCEDURE — 99222 1ST HOSP IP/OBS MODERATE 55: CPT | Performed by: INTERNAL MEDICINE

## 2023-08-11 PROCEDURE — 81001 URINALYSIS AUTO W/SCOPE: CPT

## 2023-08-11 RX ORDER — 0.9 % SODIUM CHLORIDE 0.9 %
500 INTRAVENOUS SOLUTION INTRAVENOUS ONCE
Status: COMPLETED | OUTPATIENT
Start: 2023-08-11 | End: 2023-08-11

## 2023-08-11 RX ORDER — 0.9 % SODIUM CHLORIDE 0.9 %
250 INTRAVENOUS SOLUTION INTRAVENOUS ONCE
Status: COMPLETED | OUTPATIENT
Start: 2023-08-11 | End: 2023-08-11

## 2023-08-11 RX ORDER — METOPROLOL TARTRATE 5 MG/5ML
5 INJECTION INTRAVENOUS ONCE
Status: DISCONTINUED | OUTPATIENT
Start: 2023-08-11 | End: 2023-08-18

## 2023-08-11 RX ORDER — POTASSIUM CHLORIDE 20 MEQ/1
40 TABLET, EXTENDED RELEASE ORAL ONCE
Status: COMPLETED | OUTPATIENT
Start: 2023-08-11 | End: 2023-08-11

## 2023-08-11 RX ORDER — PREDNISONE 20 MG/1
40 TABLET ORAL DAILY
Status: DISCONTINUED | OUTPATIENT
Start: 2023-08-12 | End: 2023-08-17

## 2023-08-11 RX ORDER — SODIUM CHLORIDE 9 MG/ML
INJECTION, SOLUTION INTRAVENOUS PRN
Status: DISCONTINUED | OUTPATIENT
Start: 2023-08-11 | End: 2023-08-24 | Stop reason: HOSPADM

## 2023-08-11 RX ORDER — POLYETHYLENE GLYCOL 3350 17 G/17G
17 POWDER, FOR SOLUTION ORAL DAILY
Status: DISCONTINUED | OUTPATIENT
Start: 2023-08-11 | End: 2023-08-17

## 2023-08-11 RX ORDER — DEXAMETHASONE 4 MG/1
20 TABLET ORAL DAILY
Status: DISCONTINUED | OUTPATIENT
Start: 2023-08-11 | End: 2023-08-11

## 2023-08-11 RX ADMIN — CEFEPIME 2000 MG: 2 INJECTION, POWDER, FOR SOLUTION INTRAVENOUS at 02:24

## 2023-08-11 RX ADMIN — AMIODARONE HYDROCHLORIDE 150 MG: 50 INJECTION, SOLUTION INTRAVENOUS at 09:40

## 2023-08-11 RX ADMIN — POTASSIUM CHLORIDE 40 MEQ: 1500 TABLET, EXTENDED RELEASE ORAL at 09:30

## 2023-08-11 RX ADMIN — MULTIPLE VITAMINS W/ MINERALS TAB 1 TABLET: TAB at 09:31

## 2023-08-11 RX ADMIN — VANCOMYCIN HYDROCHLORIDE 750 MG: 10 INJECTION, POWDER, LYOPHILIZED, FOR SOLUTION INTRAVENOUS at 10:10

## 2023-08-11 RX ADMIN — SODIUM CHLORIDE, PRESERVATIVE FREE 10 ML: 5 INJECTION INTRAVENOUS at 09:52

## 2023-08-11 RX ADMIN — SODIUM CHLORIDE 250 ML: 9 INJECTION, SOLUTION INTRAVENOUS at 09:58

## 2023-08-11 RX ADMIN — POLYETHYLENE GLYCOL 3350 17 G: 17 POWDER, FOR SOLUTION ORAL at 12:17

## 2023-08-11 RX ADMIN — SODIUM CHLORIDE, PRESERVATIVE FREE 10 ML: 5 INJECTION INTRAVENOUS at 20:32

## 2023-08-11 RX ADMIN — MORPHINE SULFATE 15 MG: 15 TABLET, FILM COATED, EXTENDED RELEASE ORAL at 20:32

## 2023-08-11 RX ADMIN — MORPHINE SULFATE 15 MG: 15 TABLET, FILM COATED, EXTENDED RELEASE ORAL at 09:31

## 2023-08-11 RX ADMIN — ACETAMINOPHEN 650 MG: 325 TABLET ORAL at 02:23

## 2023-08-11 RX ADMIN — DEXAMETHASONE 4 MG: 4 TABLET ORAL at 09:30

## 2023-08-11 RX ADMIN — CEFEPIME 2000 MG: 2 INJECTION, POWDER, FOR SOLUTION INTRAVENOUS at 11:16

## 2023-08-11 RX ADMIN — CEFEPIME 2000 MG: 2 INJECTION, POWDER, FOR SOLUTION INTRAVENOUS at 18:16

## 2023-08-11 RX ADMIN — DEXTROSE 0.5 MG/MIN: 5 SOLUTION INTRAVENOUS at 12:06

## 2023-08-11 RX ADMIN — LATANOPROST 1 DROP: 50 SOLUTION OPHTHALMIC at 20:32

## 2023-08-11 RX ADMIN — CALCIUM 500 MG: 500 TABLET ORAL at 09:31

## 2023-08-11 RX ADMIN — SODIUM CHLORIDE 250 ML: 9 INJECTION, SOLUTION INTRAVENOUS at 06:21

## 2023-08-11 RX ADMIN — SENNOSIDES 8.6 MG: 8.6 TABLET, FILM COATED ORAL at 09:30

## 2023-08-11 RX ADMIN — Medication 1000 UNITS: at 09:31

## 2023-08-11 RX ADMIN — FILGRASTIM-AAFI 480 MCG: 480 INJECTION, SOLUTION SUBCUTANEOUS at 10:13

## 2023-08-11 RX ADMIN — PANTOPRAZOLE SODIUM 40 MG: 20 TABLET, DELAYED RELEASE ORAL at 06:03

## 2023-08-11 RX ADMIN — SODIUM CHLORIDE 500 ML: 9 INJECTION, SOLUTION INTRAVENOUS at 08:33

## 2023-08-11 ASSESSMENT — ENCOUNTER SYMPTOMS
EYES NEGATIVE: 1
GASTROINTESTINAL NEGATIVE: 1
RESPIRATORY NEGATIVE: 1

## 2023-08-11 ASSESSMENT — PAIN SCALES - GENERAL: PAINLEVEL_OUTOF10: 2

## 2023-08-11 NOTE — PROGRESS NOTES
Hr going up into 140s and coming back down in low 100s. Bp 113/69. Notified MD on call. No new orders at this time.

## 2023-08-11 NOTE — PLAN OF CARE
Problem: Pain  Goal: Verbalizes/displays adequate comfort level or baseline comfort level  8/11/2023 1552 by Devaughn Julian RN  Outcome: Progressing   Pt with no complaints of pain this shift. Problem: Safety - Adult  Goal: Free from fall injury  8/11/2023 1552 by Devaughn Julian RN  Outcome: Progressing   Pt free from falls this shift. All safety precautions in place, encouraging pt to call for assistance as needed. Hourly rounding in place. Problem: Skin/Tissue Integrity  Goal: Absence of new skin breakdown  Description: 1. Monitor for areas of redness and/or skin breakdown  2. Assess vascular access sites hourly  3. Every 4-6 hours minimum:  Change oxygen saturation probe site  4. Every 4-6 hours:  If on nasal continuous positive airway pressure, respiratory therapy assess nares and determine need for appliance change or resting period. Outcome: Progressing   Pt with no new signs of skin breakdown, bernarda orders in place. Wound consult ordered. Keeping pt clean and dry, turning every two hours, applying triad cream to wounds on bottom, tear on R chest cleansed and re-dressed. Bilateral heel protectors on both heels, pressure points supported with pillows.

## 2023-08-11 NOTE — PROGRESS NOTES
Hr going up to 150s-160s then going down and sustaining in 120s. Notified MD on call. Stat EKG ordered. EKG showed afib with rvr. Notified resident on call. Will give metoprolol IV per order.

## 2023-08-11 NOTE — PROGRESS NOTES
Pt alert and oriented x4, VSS, IV antibiotics infusing in right chest port. Pt incontinent of bowel and bladder. Pt is being changed frequently. Pt has stage 2 on buttocks. Triad cream applied. Pt being turned q2h with pillow support. Bed alarm on, bedside table and call light in reach.

## 2023-08-11 NOTE — PROGRESS NOTES
Occupational Therapy  Facility/Department: [address]  Occupational Therapy Initial Assessment and Treatment    Name: James Flores  : 1950  MRN: [id number]  Date of Service: 2023    Discharge Recommendations:  James Flores scored a 10/24 on the AM-PAC ADL Inpatient form. Current research shows that an AM-PAC score of 17 or less is typically not associated with a discharge to the patient's home setting. Based on the patient's AM-PAC score and their current ADL deficits, it is recommended that the patient have 5-7 sessions per week of Occupational Therapy at d/c to increase the patient's independence. At this time, this patient demonstrates complex nursing, medical, and rehabilitative needs, and would benefit from intensive rehabilitation services upon discharge from the Inpatient setting. This patient demonstrates the ability to participate in and benefit from an intensive therapy program with a coordinated interdisciplinary team approach to foster frequent, structured, and documented communication among disciplines, who will work together to establish, prioritize, and achieve treatment goals. Please see assessment section for further patient specific details. If patient discharges prior to next session this note will serve as a discharge summary. Please see below for the latest assessment towards goals. OT Equipment Recommendations  Equipment Needed: No  Other: defer to d/c setting       Patient Diagnosis(es): The encounter diagnosis was Pancytopenia ([address]). Past Medical History:  has a past medical history of Cancer ([address]), CHF (congestive heart failure) ([address]), Dupuytrens contracture, GERD (gastroesophageal reflux disease), Nocturia, and Reflux esophagitis. Past Surgical History:  has a past surgical history that includes eye surgery (); Finger trigger release (Left, 2013); Prostate surgery (10/2013); Colonoscopy ( & );  Colonoscopy (2018); (with walker- reports approx 160' was the longest he walked in [place] with A)  Transfer Assistance: Needs assistance  Additional Comments: Was getting PT/OT at SNF. Hasn't been home in 8-10 weeks. Was living alone at that time and could walk without a device & could shower/dress. Had assist with meals. Objective   UE Function  AROM: Within functional limits  Strength: Generally decreased, functional (gross weakness)  Coordination: Within functional limits    ADL  LE Dressing: Dependent/Total (socks)  Additional Comments: deferred ADLs 2/2 afib and pt fatigue       Activity Tolerance  Activity Tolerance: Treatment limited secondary to medical complications  Activity Tolerance Comments: HR fluctuated from 100-170 with activity. Jumped up to 170 briefly when pt moved from supine>sit & then quickly decreased- RN notified. Pt feeling weak with all activity & required rest breaks with transition movements. Bed mobility  Supine to Sit: Moderate assistance (with rail, HOB elev)  Sit to Supine: Moderate assistance    Transfers  Sit to stand: Dependent/Total (mod A of 2) anxious with transfer  Stand to sit: moderate assistance  Transfer Comments:  pt took 3 lateral side steps using walker and mod A of 2. Required moderate VC for sequencing    Sitting Balance: CGA-SBA (occasional L lean; seated EOB ~8 minutes)  Standing Balance: mod A at walker ~20 sec static      Vision  Vision: Within Functional Limits  Hearing  Hearing: Exceptions to [facility]  Hearing Exceptions: Hard of hearing/hearing concerns      Cognition  Overall Cognitive Status: [facility]  Cognition Comment: anxious  Orientation  Overall Orientation Status: Within Functional Limits          Safety Devices  Type of Devices: Bed alarm in place; Left in bed;Nurse notified;Call light within reach (prevlon boots re-applied)    AM-PAC Score    AM-PAC Inpatient Daily Activity Raw Score: 10 (08/11/23 1544)  AM-PAC Inpatient ADL T-Scale Score : 27.31 (08/11/23 1544)  ADL

## 2023-08-11 NOTE — PROGRESS NOTES
Pt resting comfortably in bed, Amio drip running, VSS, HR in the 90-low 100s, regular in rhythm. Pt with no complaints at this time.

## 2023-08-11 NOTE — PLAN OF CARE
Problem: Pain  Goal: Verbalizes/displays adequate comfort level or baseline comfort level  Outcome: Progressing   Pt c/o generalized pain, medicated per mar with scheduled ms contin. Pt verbalized relief. Problem: Safety - Adult  Goal: Free from fall injury  Outcome: Progressing   Pt has been free from falls this shift, bed alarm on, bed in lowest position, 2/4 side rails up, nonskid socks on, wheels locked, bedside table and call light in reach. Encouraged pt to call out if needed anything.

## 2023-08-11 NOTE — CONSULTS
Reason for Consultation/Chief Complaint: Abn Labs    History of Present Illness:  Maria T Guzman is a 74 y.o. patient whom we were asked to see for P afib. Hx of afib, metastatic prostate Ca, CKD. Presents for transfusion. Noted to have Hgb 5.7. He was recently in hosp with anemia and sepsis felt due to PNA. Has p afib during that admit. Started on amio. Now having p afib with RVR. Remains anemic after transfusion with Hgb 7.2 now. Also plt count 10. WBC 0.1. Past Medical History:   has a past medical history of Cancer ([address]), CHF (congestive heart failure) ([address]), Dupuytrens contracture, GERD (gastroesophageal reflux disease), Nocturia, and Reflux esophagitis. Surgical History:   has a past surgical history that includes eye surgery (1999); Finger trigger release (Left, 08/13/2013); Prostate surgery (10/2013); Colonoscopy (2000 & 2013); Colonoscopy (02/08/2018); Endoscopy, colon, diagnostic; Tonsillectomy; CT NEEDLE BIOPSY LIVER PERCUTANEOUS (6/2/2023); and Port Surgery (Right, 6/14/2023). Social History:   reports that he quit smoking about 43 years ago. His smoking use included cigarettes. He has a 10.00 pack-year smoking history. He has never used smokeless tobacco. He reports current alcohol use. He reports that he does not use drugs. Family History:  No evidence for sudden cardiac death or premature CAD    Home Medications:  Were reviewed and are listed in nursing record. and/or listed below  Prior to Admission medications    Medication Sig Start Date End Date Taking? Authorizing Provider   amiodarone (CORDARONE) 200 MG tablet Take 1 tablet by mouth 2 times daily for 17 doses 7/7/23 7/16/23  Xiomara Browne MD   metoprolol succinate (TOPROL XL) 50 MG extended release tablet Take 1 tablet by mouth daily 7/8/23   Joaquín Crump MD   dexamethasone (DECADRON) 4 MG tablet Resume home dexamethasone 4 mg tablets daily after completing the 2 days of dexamethosone 40 mg daily.  7/7/23   Joaquín Crump MD 08/09/2023 04:44 PM    AST 16 08/09/2023 04:44 PM    ALT 21 08/09/2023 04:44 PM     PT/INR:  No results found for: PTINR  Lab Results   Component Value Date    TROPONINI <0.01 11/11/2022       EKG:  I have reviewed EKG with the following interpretation:  Impression:  Afib with RVR, NSSTTW changes. Assessment  Patient Active Problem List   Diagnosis    Gastroesophageal reflux disease with esophagitis    Migraine headache    Prostate cancer metastatic to multiple sites [facility])    Left foot pain    Allergic sinusitis    Dupuytren's contracture of right hand    Dupuytren's contracture    Sciatica of left side    Increased frequency of urination    Stage 3a chronic kidney disease (HCC)    Positive test for genetic marker of susceptibility to malignant neoplasm of breast    Backache    Benign neoplasm of skin    Benign 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 [facility])    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)    Pain in limb    Nocturia    Pain due to neoplasm    Shoulder pain    Urinary urgency    SVT (supraventricular tachycardia) ([address])    Sepsis with acute renal failure without septic shock (HCC)    Atrial flutter (HCC)    Pancytopenia (HCC)    Hyponatremia    Chemotherapy-induced neutropenia (HCC)    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 Amio bolus give with gtt. Now sinus. LFTs stable. Would continue. Restart metoprolol when bp more stable/adequate. No  AC due to pancytopenia.

## 2023-08-11 NOTE — CARE COORDINATION
Addendum at 3:46pm: Received call from [address] [place] [address], pt's POA stating he was at the [facility] and they wanted referral sent there. Spoke with Shanta Altamirano, admissions at the [facility] and faxed a facesheet via epic after confirming the fax. Vicentajohana Altamirano stated biggest barrier would be transportation to/from chemo appointments. She stated she would review and let SW know outcome    Addendum at 10:09am: Received healthcare POA from [address] [place] [address]; placed copy in hard chart. Spoke with pt, [address] [place] [address] and [facility] at bedside. [address] [place] [address] aware that he needs to call SW once they pick facilities and they would know prior to discharge if insurance approved short term care. Pt requested Felisha Avila be added to the contacts; she was added. Pt aware and agreeable to other facility at this time    9:45am: Per request of staff, spoke with [address] [place] [address] (pt's friend), pt's Janis and Felisha Avila (friend) in family meeting room. [address] [place] [address] and Felisha Avila states they want another facility and for pt to not return to [facility]. [address] [place] [address] expressing care concerns regarding [facility] and was given number for [facility] which he stated he plans on filing reports. He stated that pt's wallet is missing and he going to speak with staff at SNF regarding this. Encouraged them to look up medicare. gov for ratings regarding SNF's. [address] [place] [address] states that he is POA and SW requested copies as none are on file which he stated would provide to staff. Explained if pt is alert/oriented, then pt would need to be agreeable to another SNF or facility which they stated understanding. They are aware of what [facility] covers and it doesn't cover long term care so it would be self pay. Felisha Avila stated pt has a long term care policy and SW explained the facility would need to look into the policy which they stated understanding. Explained that PT/OT is pending and if SNF is recommended, insurance would need to approve SNF. Provided contact number for SW to [address] [place] [address].      [address] [place] [address] would like for Felisha Avila to be added to epic and aware pt would

## 2023-08-11 NOTE — PROGRESS NOTES
Physical Therapy  Facility/Department: [address]  Physical Therapy Initial Assessment/Treatment    Name: Tima Bob  : 1950  MRN: [id number]  Date of Service: 2023    Discharge Recommendations: Tima Bob scored a 8/24 on the AM-PAC short mobility form. Current research shows that an AM-PAC score of 17 or less is typically not associated with a discharge to the patient's home setting. Based on the patient's AM-PAC score and their current functional mobility deficits, it is recommended that the patient have 3-5 sessions per week of Physical Therapy at d/c to increase the patient's independence. Please see assessment section for further patient specific details. If patient discharges prior to next session this note will serve as a discharge summary. Please see below for the latest assessment towards goals. PT Equipment Recommendations  Equipment Needed: No (defer to SNF)      Patient Diagnosis(es): The encounter diagnosis was Pancytopenia ([address]). Past Medical History:  has a past medical history of Cancer ([address]), CHF (congestive heart failure) ([address]), Dupuytrens contracture, GERD (gastroesophageal reflux disease), Nocturia, and Reflux esophagitis. Past Surgical History:  has a past surgical history that includes eye surgery (); Finger trigger release (Left, 2013); Prostate surgery (10/2013); Colonoscopy ( & ); Colonoscopy (2018); Endoscopy, colon, diagnostic; Tonsillectomy; CT NEEDLE BIOPSY LIVER PERCUTANEOUS (2023); and Port Surgery (Right, 2023). Assessment   Assessment: 75 yo adm from SNF with anemia. Pt reports ambulatory at [facility] with walker & A of 1 and was receiving PT/OT. Limited tolerance for therapy today due to elev HR (up to 170 at one point) & was only able to stand briefly 1x. Will continue to progress mobility during hospital stay as pt can tolerate & [facility]   Appears well below hs baseline currently. Treatment Diagnosis: impaired mobility  Therapy Prognosis: Guarded  Decision Making: Medium Complexity  Requires PT Follow-Up: Yes  Activity Tolerance  Activity Tolerance: Treatment limited secondary to medical complications  Activity Tolerance Comments: HR fluctuated from 100-170 with activity. Jumped up to 170 briefly when pt moved from supine>sit & then quickly decreased- RN notified. Pt feeling weak with all activity & required rest breaks with transition movements. /93 sitting EOB. Plan   Physcial Therapy Plan  General Plan:  (2-5)  Current Treatment Recommendations: Functional mobility training, Transfer training, Gait training, Patient/Caregiver education & training, Safety education & training, Strengthening  Safety Devices  Type of Devices: Bed alarm in place, Left in bed, Nurse notified, Call light within reach (prevlon boots re-applied)     Restrictions  Position Activity Restriction  Other position/activity restrictions: up with A     Subjective   General  Chart Reviewed: Yes  Additional Pertinent Hx: Adm 8/9, 74 y.o. male who presents to the emergency department for blood transfusion. Presents from [facility]. He had a hemoglobin of 5.7. PMHx of relapsed metastatic prostate cancer, A-flutter, CHF, CKD, GERD. Pressure sores on buttocks. Family / Caregiver Present: No  Referring Practitioner: Nicky Gould MD  Diagnosis: acute anemia  Follows Commands: Within Functional Limits  Subjective  Subjective: Found in bed, agreeable to PT. RN reports pt now on drip & HR improved - ok to see but watch HR closely. Denies pain but reports exhaustion/fatigue.          Social/Functional History  Social/Functional History  Type of Home: Facility  ADL Assistance: Needs assistance  Ambulation Assistance: Needs assistance (with walker- reports approx 160' was the longest he walked in [place] with A)  Transfer Assistance: Needs assistance  Additional Comments: Was getting

## 2023-08-11 NOTE — PROGRESS NOTES
Physical Therapy/Occupational Therapy  HOLD  RN requesting holding therapy this AM due to pt in afib. RN hopeful pt can work with therapy this afternoon. Will follow up later today.   Jennifer Meléndez, [address] [facility] [address]

## 2023-08-11 NOTE — CONSULTS
Clinical Pharmacy Progress Note    Vancomycin - Management by Pharmacy    Consult Date(s): 08/10/2023  Consulting Provider(s): Dr. Thompson Speak / Plan  Febrile neutropenia- Vancomycin  Concurrent Antimicrobials: cefepime  Day of Vanc Therapy / Ordered Duration: 3  Current Dosing Method: Bayesian-Guided AUC Dosing  Therapeutic Goal: -600 mg/L*hr  Current Dose / Plan:   Currently on Vancomycin 750 mg IV q 12 hr  Level today is 11.7 which calculates an AUC of 413   Increase Vancomycin to 1000 mg IV q 12 hr due to fever overnight and hypotension. Estimated  mg/L*hr  Estimated trough 16.3 mg/L  Vanc random level ordered for 8/13 @ 0800  Will continue to monitor clinical condition and make adjustments to regimen as appropriate. Thank you for consulting pharmacy,    Dulce Ballesteros [facility], PharmD  8/11/2023 10:41 AM       Interval update: Pt spiked mild fever overnight (100.4). Pt given tylenol and currently afebrile, hypotensive (93/57), and tachycardiac (134). Subjective/Objective:   Randi Yoder is a 74 y.o. male with a PMHx significant for relapsed metastatic prostate cancer, A-flutter, CHF, CKD, GERD presented from [facility] after abnormal hemoglobin level of 5. 7. who is admitted with abnormal labs. Pharmacy is consulted to vancomycin dosing.     Ht Readings from Last 1 Encounters:   08/09/23 5' 9\" (1.753 m)     Wt Readings from Last 1 Encounters:   08/09/23 120 lb 4.8 oz (54.6 kg)     Current & Prior Antimicrobial Regimen(s):  Cefepime 2000 mg IV x 1- 8/9  Cefepime 2000 mg IV q 8 hr- (8/10-current)  Vancomycin 1,250 x 1- 8/9  Vancomycin 750 mg q12h (8/10-8/11)  Vancomycin 1,000 mg q12h (8/11-current)     Vancomycin Level(s) / Doses:    Date Time Dose Type of Level / Level Interpretation   8/11 1010 750 mg Random/ 11.7   Increased to 1,000 mg q12h          Note: Serum levels collected for AUC-based dosing may be high if collected in close proximity to the dose

## 2023-08-11 NOTE — PROGRESS NOTES
Progress Note    Admit Date: 8/9/2023  Day: 3  Diet: ADULT DIET; Regular  ADULT ORAL NUTRITION SUPPLEMENT; Breakfast, Lunch, Dinner; Standard High Calorie/High Protein Oral Supplement    CC: Abnormal labs    Interval history: Patient was seen at bedside this AM. He reports fatigue but feels better than yesterday. He has never felt feverish or sick since being here. Overnight he had a fever of 100.4F and was given Tylenol. He also has been tachycardic to the 170s, but does not report chest pain or palpitations. Will be given another unit of blood today. We started him on an amiodarone drip considering he benefited from the amiodarone bolus this AM.     HPI: Chester Melo is a 74 y. o.male with a PMHx of relapsed metastatic prostate cancer, A-flutter, CHF, CKD, GERD presented from [facility] after abnormal hemoglobin level of 5.7. He was transferred here for blood transfusion. Patient has no complaints but a bit frustrated that he had to come in to get transfusion. He follows oncologist Dr. Patti Dumont [facility]). Patient states that he had his chemo 2 weeks ago and the next one is due in a week. Denies any fever, chills, CP, SOB at rest, N/V, lightheadedness, dizziness, abdominal pain, constipation, BRBPR, dark stool or any urinary changes but endorsees SOB with exertion and diarrhea after chemo. Of note, patient was recently discharged on 7/15, presented with similar labs and SOB. Was treated for sepsis secondary to pneumonia and was transfused 1 unit of blood. On arrival to the ED, patient was afebrile and hemodynamically stable, saturating well on room air. Labs were significant for elevated lactic acid of 2.3 and pancytopenic. Hyponatremic and hypoalbuminemia.      Medications:     Scheduled Meds:   metoprolol  5 mg IntraVENous Once    potassium chloride  40 mEq Oral Once    sodium chloride  250 mL IntraVENous Once    amiodarone bolus  150 mg IntraVENous Once    vancomycin  750 mg IntraVENous Q12H

## 2023-08-11 NOTE — PROGRESS NOTES
ChipHazel sent to MD Lloyd Sosa @0750 this AM: Hi, pt's HR jumping up more after you left this AM. Pt's HR ranging from 140s-150s, it did jump to 170s. BP: 90/59, other VSS. Pt did receive a bolus that finished an hour ago, does not seem to have helped much. Would you like me to do anything further? Spoke with MD Lloyd Sosa on the phone, verbal orders to transfuse 1 unit PRBCs and new order for a second 500mL bolus. Orders placed, bolus started. Pt continuing to be in afib with RVR. PerfectServe sent to MD Jannet Schwab at 0585: Patient HR still sustaining in 130s-160s with BP soft at 89/44 MAP of 58. Can you come to bedside. Also should we maybe consult cardiology? Please advise. MD Anderson at bedside, cycling BP every 10 mins. PRBCs infusing. New orders for stat Chest XRY, second bolus of 250 mLs, amiodarone bolus, oral potassium reaplacement. All completed. KUB ordered and obtained, all new labs collected and sent. Pt's HR improved temporarily after amio bolus. Notified MD Anderson of HR continuing to increase to 150s after bolus completed. Medical team returned to bedside, new orders for continuous amiodarone drip and cardiology consult. Drip initiated. Pt's VSS at this time, HR 110s-120s, regular in rhythm. Pt denies symptoms. Able to eat all of his lunch.

## 2023-08-11 NOTE — ACP (ADVANCE CARE PLANNING)
Advance Care Planning     General Advance Care Planning (ACP) Conversation    Date of Conversation: 8/11/2023  Conducted with: Patient with [address]: Named in Advance Directive or Healthcare Power of HEATHER SANTANA (name) pt's friend Gin Garlande Decision Maker:    Primary Decision Maker: Nathalie Reynolds - [facility] - [phone]  Click here to complete Healthcare Decision Makers including selection of the Healthcare Decision Maker Relationship (ie \"Primary\").    SW obtained healthcare POA on file from [facility] and placed in hard chart    Length of Voluntary ACP Conversation in minutes:  <16 minutes (Non-Billable)    MAKEDA Mitchell   for [employer] and [address] [place] [address] ([address] [place] [address])  Office Phone: [phone] [address]: [phone]

## 2023-08-11 NOTE — PROGRESS NOTES
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 Metastatic prostate cancer -   -initially diagnosed with an adenocarcinoma of the prostate, Scarlett score 6 in 9/2014  - He was treated with CyberKnife-- 6600 cGy to the pelvic mass between 4/15 - 6/02/2020   -He was then followed without therapy until the PSA reached 42.25 ng/mL on 10/04/2022.   A follow-up PSMA PET/CT on 11/16/2022 showed diffuse uptake in the prostate and widespread osseous metastatic disease with a large focus of uptake at T7.  -He received 3000 cGy to T6-T8 between 11/30 - 12/13/2022   -He started Eligard and Abiraterone Acetate on 12/21/2022  - MRI of the thoracic spine was performed on 4/19/2023, which showed bulky mets at T1, T2, T4, T11, T12, L1  -A follow up CT of the chest/abdomen/pelvis and a bone scan each performed on 5/15/2023 showed widespread liver mets, diffuse osseous metastatic disease with numerous large lytic lesions and extension into the epidural space at L1, L2, L5, and S1.  -He received 3000 cGy to T11-S1 inclusive between 5/22 - 6/06/2023   -In the midst of palliative radiation with Dr. Yen President, the patient underwent a CT-guided biopsy of the liver on 6/02/2023, which showed  metastatic poorly differentiated/high-grade neuroendocrine carcinoma. The pathologist could not rule out small cell lung cancer  - this is his 3rd admission since June   - He is s/p cycle #2 Carbo/Etoposide 7/31/23    2. Pancytopenia     Secondary to marrow suppression from chemotherapy   - -Review of the hematological parameters revealed that his platelet counts were normal until 6/2/2023.  - his last platelet count in office was normal at 151  -He appeared to have responded to pulse dose of dexamethasone 40 mg p.o. daily for 4 days given in the last admission. Patient will likely benefit from protracted  course of steroids. --Ordered for prednisone 40 mg p.o. daily  -Recommend packed RBC transfusion to maintain hemoglobin of at least 7 g%. - started on  Filgrastin till the Nicholasberg is at least 1000.  -To transfuse 1 unit of platelet pheresis if platelet count drops to less than 10K.            ONCOLOGIC DISPOSITION:    As per the primary team.    Get De Dios MD  Please contact through [facility]

## 2023-08-12 LAB
ALBUMIN SERPL-MCNC: 2.3 G/DL (ref 3.4–5)
ANION GAP SERPL CALCULATED.3IONS-SCNC: 9 MMOL/L (ref 3–16)
BACTERIA THROAT AEROBE CULT: ABNORMAL
BACTERIA THROAT AEROBE CULT: ABNORMAL
BLOOD BANK DISPENSE STATUS: NORMAL
BLOOD BANK PRODUCT CODE: NORMAL
BPU ID: NORMAL
BUN SERPL-MCNC: 23 MG/DL (ref 7–20)
CALCIUM SERPL-MCNC: 7.8 MG/DL (ref 8.3–10.6)
CHLORIDE SERPL-SCNC: 103 MMOL/L (ref 99–110)
CO2 SERPL-SCNC: 21 MMOL/L (ref 21–32)
CREAT SERPL-MCNC: 0.5 MG/DL (ref 0.8–1.3)
DEPRECATED RDW RBC AUTO: 19.4 % (ref 12.4–15.4)
DEPRECATED RDW RBC AUTO: 19.4 % (ref 12.4–15.4)
DESCRIPTION BLOOD BANK: NORMAL
GFR SERPLBLD CREATININE-BSD FMLA CKD-EPI: >60 ML/MIN/{1.73_M2}
GLUCOSE SERPL-MCNC: 115 MG/DL (ref 70–99)
HCT VFR BLD AUTO: 23.3 % (ref 40.5–52.5)
HCT VFR BLD AUTO: 24 % (ref 40.5–52.5)
HGB BLD-MCNC: 7.8 G/DL (ref 13.5–17.5)
HGB BLD-MCNC: 8 G/DL (ref 13.5–17.5)
MAGNESIUM SERPL-MCNC: 1.9 MG/DL (ref 1.8–2.4)
MCH RBC QN AUTO: 29 PG (ref 26–34)
MCH RBC QN AUTO: 29 PG (ref 26–34)
MCHC RBC AUTO-ENTMCNC: 33.5 G/DL (ref 31–36)
MCHC RBC AUTO-ENTMCNC: 33.6 G/DL (ref 31–36)
MCV RBC AUTO: 86.1 FL (ref 80–100)
MCV RBC AUTO: 86.7 FL (ref 80–100)
MRSA DNA SPEC QL NAA+PROBE: ABNORMAL
ORGANISM: ABNORMAL
ORGANISM: ABNORMAL
PHOSPHATE SERPL-MCNC: 0.9 MG/DL (ref 2.5–4.9)
PHOSPHATE SERPL-MCNC: 2.3 MG/DL (ref 2.5–4.9)
PLATELET # BLD AUTO: 19 K/UL (ref 135–450)
PLATELET # BLD AUTO: 8 K/UL (ref 135–450)
PMV BLD AUTO: 10.3 FL (ref 5–10.5)
PMV BLD AUTO: 8.4 FL (ref 5–10.5)
POTASSIUM SERPL-SCNC: 4.7 MMOL/L (ref 3.5–5.1)
RBC # BLD AUTO: 2.7 M/UL (ref 4.2–5.9)
RBC # BLD AUTO: 2.77 M/UL (ref 4.2–5.9)
SODIUM SERPL-SCNC: 133 MMOL/L (ref 136–145)
WBC # BLD AUTO: 0.3 K/UL (ref 4–11)
WBC # BLD AUTO: 0.5 K/UL (ref 4–11)

## 2023-08-12 PROCEDURE — 2060000000 HC ICU INTERMEDIATE R&B

## 2023-08-12 PROCEDURE — 2580000003 HC RX 258

## 2023-08-12 PROCEDURE — 94761 N-INVAS EAR/PLS OXIMETRY MLT: CPT

## 2023-08-12 PROCEDURE — 6370000000 HC RX 637 (ALT 250 FOR IP): Performed by: INTERNAL MEDICINE

## 2023-08-12 PROCEDURE — 36430 TRANSFUSION BLD/BLD COMPNT: CPT

## 2023-08-12 PROCEDURE — 6360000002 HC RX W HCPCS: Performed by: HOSPITALIST

## 2023-08-12 PROCEDURE — 84100 ASSAY OF PHOSPHORUS: CPT

## 2023-08-12 PROCEDURE — 99232 SBSQ HOSP IP/OBS MODERATE 35: CPT | Performed by: INTERNAL MEDICINE

## 2023-08-12 PROCEDURE — 2500000003 HC RX 250 WO HCPCS

## 2023-08-12 PROCEDURE — 6370000000 HC RX 637 (ALT 250 FOR IP)

## 2023-08-12 PROCEDURE — 99233 SBSQ HOSP IP/OBS HIGH 50: CPT | Performed by: HOSPITALIST

## 2023-08-12 PROCEDURE — 80069 RENAL FUNCTION PANEL: CPT

## 2023-08-12 PROCEDURE — 83735 ASSAY OF MAGNESIUM: CPT

## 2023-08-12 PROCEDURE — 6360000002 HC RX W HCPCS

## 2023-08-12 PROCEDURE — 85025 COMPLETE CBC W/AUTO DIFF WBC: CPT

## 2023-08-12 RX ORDER — SODIUM CHLORIDE 9 MG/ML
INJECTION, SOLUTION INTRAVENOUS PRN
Status: DISCONTINUED | OUTPATIENT
Start: 2023-08-12 | End: 2023-08-24 | Stop reason: HOSPADM

## 2023-08-12 RX ORDER — AMIODARONE HYDROCHLORIDE 200 MG/1
200 TABLET ORAL 2 TIMES DAILY
Status: DISCONTINUED | OUTPATIENT
Start: 2023-08-12 | End: 2023-08-15

## 2023-08-12 RX ADMIN — SODIUM PHOSPHATE, MONOBASIC, MONOHYDRATE AND SODIUM PHOSPHATE, DIBASIC, ANHYDROUS 30 MMOL: 142; 276 INJECTION, SOLUTION INTRAVENOUS at 08:49

## 2023-08-12 RX ADMIN — POLYETHYLENE GLYCOL 3350 17 G: 17 POWDER, FOR SOLUTION ORAL at 08:49

## 2023-08-12 RX ADMIN — SODIUM CHLORIDE, PRESERVATIVE FREE 10 ML: 5 INJECTION INTRAVENOUS at 08:50

## 2023-08-12 RX ADMIN — MORPHINE SULFATE 15 MG: 15 TABLET, FILM COATED, EXTENDED RELEASE ORAL at 20:57

## 2023-08-12 RX ADMIN — PREDNISONE 40 MG: 20 TABLET ORAL at 08:50

## 2023-08-12 RX ADMIN — LATANOPROST 1 DROP: 50 SOLUTION OPHTHALMIC at 21:00

## 2023-08-12 RX ADMIN — FILGRASTIM-AAFI 480 MCG: 480 INJECTION, SOLUTION SUBCUTANEOUS at 08:50

## 2023-08-12 RX ADMIN — METOPROLOL SUCCINATE 50 MG: 50 TABLET, EXTENDED RELEASE ORAL at 08:49

## 2023-08-12 RX ADMIN — VANCOMYCIN HYDROCHLORIDE 1000 MG: 10 INJECTION, POWDER, LYOPHILIZED, FOR SOLUTION INTRAVENOUS at 00:04

## 2023-08-12 RX ADMIN — MULTIPLE VITAMINS W/ MINERALS TAB 1 TABLET: TAB at 08:49

## 2023-08-12 RX ADMIN — CEFEPIME 2000 MG: 2 INJECTION, POWDER, FOR SOLUTION INTRAVENOUS at 21:02

## 2023-08-12 RX ADMIN — VANCOMYCIN HYDROCHLORIDE 1000 MG: 10 INJECTION, POWDER, LYOPHILIZED, FOR SOLUTION INTRAVENOUS at 10:37

## 2023-08-12 RX ADMIN — MORPHINE SULFATE 15 MG: 15 TABLET, FILM COATED, EXTENDED RELEASE ORAL at 08:49

## 2023-08-12 RX ADMIN — SODIUM PHOSPHATE, MONOBASIC, MONOHYDRATE AND SODIUM PHOSPHATE, DIBASIC, ANHYDROUS 15 MMOL: 142; 276 INJECTION, SOLUTION INTRAVENOUS at 17:22

## 2023-08-12 RX ADMIN — CALCIUM 500 MG: 500 TABLET ORAL at 08:50

## 2023-08-12 RX ADMIN — SODIUM CHLORIDE, PRESERVATIVE FREE 10 ML: 5 INJECTION INTRAVENOUS at 20:59

## 2023-08-12 RX ADMIN — CEFEPIME 2000 MG: 2 INJECTION, POWDER, FOR SOLUTION INTRAVENOUS at 11:39

## 2023-08-12 RX ADMIN — AMIODARONE HYDROCHLORIDE 200 MG: 200 TABLET ORAL at 20:59

## 2023-08-12 RX ADMIN — SENNOSIDES 8.6 MG: 8.6 TABLET, FILM COATED ORAL at 08:49

## 2023-08-12 RX ADMIN — VANCOMYCIN HYDROCHLORIDE 1000 MG: 10 INJECTION, POWDER, LYOPHILIZED, FOR SOLUTION INTRAVENOUS at 22:05

## 2023-08-12 RX ADMIN — AMIODARONE HYDROCHLORIDE 200 MG: 200 TABLET ORAL at 08:49

## 2023-08-12 RX ADMIN — Medication 1000 UNITS: at 08:49

## 2023-08-12 RX ADMIN — CEFEPIME 2000 MG: 2 INJECTION, POWDER, FOR SOLUTION INTRAVENOUS at 04:03

## 2023-08-12 RX ADMIN — PANTOPRAZOLE SODIUM 40 MG: 20 TABLET, DELAYED RELEASE ORAL at 06:24

## 2023-08-12 NOTE — CONSULTS
Clinical Pharmacy Progress Note    Vancomycin - Management by Pharmacy    Consult Date(s): 08/10/2023  Consulting Provider(s): Dr. Tali Kaplan / Plan  Febrile neutropenia- Vancomycin  Concurrent Antimicrobials: cefepime  Day of Vanc Therapy / Ordered Duration: 4  Current Dosing Method: Bayesian-Guided AUC Dosing  Therapeutic Goal: -600 mg/L*hr  Current Dose / Plan:   Continue vancomycin 1000 mg IV q 12 hr this estimates an AUC of 477 mg/L*hr and a trough of 13.3 mg/L  Vanc random level ordered for 8/13 @ 0800  Will continue to monitor clinical condition and make adjustments to regimen as appropriate. Thank you for consulting pharmacy,    Aisha Celaya, [facility], PharmD  8/12/2023 10:29 AM       Interval update: Pt afebrile, normotensive, and tachycardiac resolved. Subjective/Objective:   Gabriel Meraz is a 74 y.o. male with a PMHx significant for relapsed metastatic prostate cancer, A-flutter, CHF, CKD, GERD presented from [facility] after abnormal hemoglobin level of 5. 7. who is admitted with abnormal labs. Pharmacy is consulted to vancomycin dosing. Ht Readings from Last 1 Encounters:   08/09/23 5' 9\" (1.753 m)     Wt Readings from Last 1 Encounters:   08/09/23 120 lb 4.8 oz (54.6 kg)     Current & Prior Antimicrobial Regimen(s):  Cefepime 2000 mg IV q 8 hr- (8/10-current)Vancomycin 1,000 mg q12h (8/11-current)     Vancomycin Level(s) / Doses:    Date Time Dose Type of Level / Level Interpretation   8/11 1010 750 mg Random/ 11.7   Increased to 1,000 mg q12h   8/12 0004 1,000 mg q12h  Random pend  Predicted   Continue current dose    Note: Serum levels collected for AUC-based dosing may be high if collected in close proximity to the dose administered. This is not necessarily indicative of toxicity.     Cultures & Sensitivities:    Date Site Micro Susceptibility / Result   8/9 Blood 2/2 NGTD    8/9 Throat culture  MRSA +    8/9 Fungus culture throat No fungal

## 2023-08-12 NOTE — PROGRESS NOTES
Pt without issues today. VSS. Pt remained in bed throughout shift despite encouragement to chair. Pt also encouraged to turn from side to side and avoid lying on back directly 2/2 coccyx wound. Pt attempts to go to side at times but still remains on back majority of time. Pillows placed to help pt stay side to side.

## 2023-08-12 NOTE — PROGRESS NOTES
Progress Note      Admit Date: 8/9/2023  Day: 4  Diet: ADULT DIET; Regular  ADULT ORAL NUTRITION SUPPLEMENT; Breakfast, Lunch, Dinner; Standard High Calorie/High Protein Oral Supplement    CC: Abnormal labs    Interval history: Patient was seen at bedside this AM. He reports baseline fatigue but feels alright. He reports no feelings of fever or illness. Overnight, he did not spike a fever or become tachycardic. He is on Amiodarone 150mg drip. He is stable. He was given 1 unit of platelets as his platelet count dropped to 8. Phosphorus was 0.9 so phosphorus was given. He denies any nausea, vomiting, chills, chest pain, and/or cough. HPI: Radha Rogers is a 74 y. o.male with a PMHx of relapsed metastatic prostate cancer, A-flutter, CHF, CKD, GERD presented from [facility] after abnormal hemoglobin level of 5.7. He was transferred here for blood transfusion. Patient has no complaints but a bit frustrated that he had to come in to get transfusion. He follows oncologist Dr. Dakota Collins [facility]). Patient states that he had his chemo 2 weeks ago and the next one is due in a week. Denies any fever, chills, CP, SOB at rest, N/V, lightheadedness, dizziness, abdominal pain, constipation, BRBPR, dark stool or any urinary changes but endorsees SOB with exertion and diarrhea after chemo. Of note, patient was recently discharged on 7/15, presented with similar labs and SOB. Was treated for sepsis secondary to pneumonia and was transfused 1 unit of blood. On arrival to the ED, patient was afebrile and hemodynamically stable, saturating well on room air. Labs were significant for elevated lactic acid of 2.3 and pancytopenic. Hyponatremic and hypoalbuminemia.      Medications:     Scheduled Meds:   sodium phosphate IVPB  30 mmol IntraVENous Once    amiodarone  200 mg Oral BID    metoprolol  5 mg IntraVENous Once    polyethylene glycol  17 g Oral Daily    vancomycin  1,000 mg IntraVENous Q12H    predniSONE  40 mg Oral Daily

## 2023-08-13 PROBLEM — I48.91 ATRIAL FIBRILLATION WITH RVR (HCC): Status: ACTIVE | Noted: 2023-08-13

## 2023-08-13 PROBLEM — Z79.899 ON AMIODARONE THERAPY: Status: ACTIVE | Noted: 2023-08-13

## 2023-08-13 PROBLEM — E83.39 HYPOPHOSPHATEMIA: Status: ACTIVE | Noted: 2023-08-13

## 2023-08-13 LAB
ALBUMIN SERPL-MCNC: 2.6 G/DL (ref 3.4–5)
ANION GAP SERPL CALCULATED.3IONS-SCNC: 12 MMOL/L (ref 3–16)
ANISOCYTOSIS BLD QL SMEAR: ABNORMAL
BACTERIA BLD CULT ORG #2: NORMAL
BACTERIA BLD CULT: NORMAL
BASOPHILS # BLD: 0 K/UL (ref 0–0.2)
BASOPHILS NFR BLD: 0 %
BUN SERPL-MCNC: 30 MG/DL (ref 7–20)
CALCIUM SERPL-MCNC: 7.8 MG/DL (ref 8.3–10.6)
CHLORIDE SERPL-SCNC: 104 MMOL/L (ref 99–110)
CO2 SERPL-SCNC: 21 MMOL/L (ref 21–32)
CREAT SERPL-MCNC: 0.7 MG/DL (ref 0.8–1.3)
DEPRECATED RDW RBC AUTO: 20 % (ref 12.4–15.4)
EOSINOPHIL # BLD: 0 K/UL (ref 0–0.6)
EOSINOPHIL NFR BLD: 0 %
FINAL REPORT: NORMAL
GFR SERPLBLD CREATININE-BSD FMLA CKD-EPI: >60 ML/MIN/{1.73_M2}
GLUCOSE SERPL-MCNC: 131 MG/DL (ref 70–99)
HCT VFR BLD AUTO: 25.1 % (ref 40.5–52.5)
HCT VFR BLD AUTO: 25.6 % (ref 40.5–52.5)
HGB BLD-MCNC: 8.5 G/DL (ref 13.5–17.5)
HGB BLD-MCNC: 8.6 G/DL (ref 13.5–17.5)
LYMPHOCYTES # BLD: 0.1 K/UL (ref 1–5.1)
LYMPHOCYTES NFR BLD: 7 %
MAGNESIUM SERPL-MCNC: 2 MG/DL (ref 1.8–2.4)
MCH RBC QN AUTO: 29.2 PG (ref 26–34)
MCHC RBC AUTO-ENTMCNC: 33.8 G/DL (ref 31–36)
MCV RBC AUTO: 86.3 FL (ref 80–100)
MONOCYTES # BLD: 0 K/UL (ref 0–1.3)
MONOCYTES NFR BLD: 2 %
NEUTROPHILS # BLD: 1.2 K/UL (ref 1.7–7.7)
NEUTROPHILS NFR BLD: 89 %
NEUTS BAND NFR BLD MANUAL: 2 % (ref 0–7)
PHOSPHATE SERPL-MCNC: 2.4 MG/DL (ref 2.5–4.9)
PHOSPHATE SERPL-MCNC: 2.8 MG/DL (ref 2.5–4.9)
PLATELET # BLD AUTO: 12 K/UL (ref 135–450)
PMV BLD AUTO: 9.2 FL (ref 5–10.5)
POTASSIUM SERPL-SCNC: 4.7 MMOL/L (ref 3.5–5.1)
PRELIMINARY: NORMAL
RBC # BLD AUTO: 2.96 M/UL (ref 4.2–5.9)
SODIUM SERPL-SCNC: 137 MMOL/L (ref 136–145)
VANCOMYCIN SERPL-MCNC: 20.2 UG/ML
WBC # BLD AUTO: 1.3 K/UL (ref 4–11)

## 2023-08-13 PROCEDURE — 85018 HEMOGLOBIN: CPT

## 2023-08-13 PROCEDURE — 2580000003 HC RX 258

## 2023-08-13 PROCEDURE — 80069 RENAL FUNCTION PANEL: CPT

## 2023-08-13 PROCEDURE — 6370000000 HC RX 637 (ALT 250 FOR IP)

## 2023-08-13 PROCEDURE — 6370000000 HC RX 637 (ALT 250 FOR IP): Performed by: INTERNAL MEDICINE

## 2023-08-13 PROCEDURE — 2060000000 HC ICU INTERMEDIATE R&B

## 2023-08-13 PROCEDURE — 2500000003 HC RX 250 WO HCPCS

## 2023-08-13 PROCEDURE — 6360000002 HC RX W HCPCS

## 2023-08-13 PROCEDURE — 85014 HEMATOCRIT: CPT

## 2023-08-13 PROCEDURE — 83735 ASSAY OF MAGNESIUM: CPT

## 2023-08-13 PROCEDURE — 6360000002 HC RX W HCPCS: Performed by: HOSPITALIST

## 2023-08-13 PROCEDURE — 80202 ASSAY OF VANCOMYCIN: CPT

## 2023-08-13 PROCEDURE — 85025 COMPLETE CBC W/AUTO DIFF WBC: CPT

## 2023-08-13 PROCEDURE — 99232 SBSQ HOSP IP/OBS MODERATE 35: CPT | Performed by: HOSPITALIST

## 2023-08-13 PROCEDURE — 99232 SBSQ HOSP IP/OBS MODERATE 35: CPT | Performed by: NURSE PRACTITIONER

## 2023-08-13 RX ORDER — HYDROCORTISONE 25 MG/G
CREAM TOPICAL 2 TIMES DAILY
Status: DISCONTINUED | OUTPATIENT
Start: 2023-08-13 | End: 2023-08-24 | Stop reason: HOSPADM

## 2023-08-13 RX ADMIN — CEFEPIME 2000 MG: 2 INJECTION, POWDER, FOR SOLUTION INTRAVENOUS at 11:57

## 2023-08-13 RX ADMIN — PREDNISONE 40 MG: 20 TABLET ORAL at 09:07

## 2023-08-13 RX ADMIN — SODIUM CHLORIDE, PRESERVATIVE FREE 10 ML: 5 INJECTION INTRAVENOUS at 21:12

## 2023-08-13 RX ADMIN — MORPHINE SULFATE 15 MG: 15 TABLET, FILM COATED, EXTENDED RELEASE ORAL at 09:04

## 2023-08-13 RX ADMIN — VANCOMYCIN HYDROCHLORIDE 1000 MG: 10 INJECTION, POWDER, LYOPHILIZED, FOR SOLUTION INTRAVENOUS at 10:05

## 2023-08-13 RX ADMIN — MORPHINE SULFATE 15 MG: 15 TABLET, FILM COATED, EXTENDED RELEASE ORAL at 21:12

## 2023-08-13 RX ADMIN — SENNOSIDES 8.6 MG: 8.6 TABLET, FILM COATED ORAL at 09:08

## 2023-08-13 RX ADMIN — FILGRASTIM-AAFI 480 MCG: 480 INJECTION, SOLUTION SUBCUTANEOUS at 09:12

## 2023-08-13 RX ADMIN — POLYETHYLENE GLYCOL 3350 17 G: 17 POWDER, FOR SOLUTION ORAL at 09:12

## 2023-08-13 RX ADMIN — HYDROCORTISONE 2.5%: 25 CREAM TOPICAL at 21:11

## 2023-08-13 RX ADMIN — LATANOPROST 1 DROP: 50 SOLUTION OPHTHALMIC at 21:12

## 2023-08-13 RX ADMIN — PANTOPRAZOLE SODIUM 40 MG: 20 TABLET, DELAYED RELEASE ORAL at 06:28

## 2023-08-13 RX ADMIN — Medication 1000 UNITS: at 09:08

## 2023-08-13 RX ADMIN — METOPROLOL SUCCINATE 50 MG: 50 TABLET, EXTENDED RELEASE ORAL at 09:07

## 2023-08-13 RX ADMIN — SODIUM PHOSPHATE, MONOBASIC, MONOHYDRATE AND SODIUM PHOSPHATE, DIBASIC, ANHYDROUS 10 MMOL: 142; 276 INJECTION, SOLUTION INTRAVENOUS at 06:29

## 2023-08-13 RX ADMIN — AMIODARONE HYDROCHLORIDE 200 MG: 200 TABLET ORAL at 09:07

## 2023-08-13 RX ADMIN — CALCIUM 500 MG: 500 TABLET ORAL at 09:08

## 2023-08-13 RX ADMIN — MULTIPLE VITAMINS W/ MINERALS TAB 1 TABLET: TAB at 09:08

## 2023-08-13 RX ADMIN — HYDROCORTISONE 2.5%: 25 CREAM TOPICAL at 09:53

## 2023-08-13 RX ADMIN — SODIUM CHLORIDE, PRESERVATIVE FREE 10 ML: 5 INJECTION INTRAVENOUS at 09:14

## 2023-08-13 RX ADMIN — AMIODARONE HYDROCHLORIDE 200 MG: 200 TABLET ORAL at 21:12

## 2023-08-13 RX ADMIN — CEFEPIME 2000 MG: 2 INJECTION, POWDER, FOR SOLUTION INTRAVENOUS at 19:28

## 2023-08-13 RX ADMIN — VANCOMYCIN HYDROCHLORIDE 750 MG: 10 INJECTION, POWDER, LYOPHILIZED, FOR SOLUTION INTRAVENOUS at 23:25

## 2023-08-13 RX ADMIN — CEFEPIME 2000 MG: 2 INJECTION, POWDER, FOR SOLUTION INTRAVENOUS at 04:52

## 2023-08-13 ASSESSMENT — PAIN DESCRIPTION - ORIENTATION: ORIENTATION: INNER

## 2023-08-13 ASSESSMENT — PAIN DESCRIPTION - LOCATION: LOCATION: RECTUM

## 2023-08-13 ASSESSMENT — PAIN DESCRIPTION - DESCRIPTORS: DESCRIPTORS: SORE

## 2023-08-13 ASSESSMENT — ENCOUNTER SYMPTOMS: ABDOMINAL PAIN: 1

## 2023-08-13 ASSESSMENT — PAIN SCALES - GENERAL: PAINLEVEL_OUTOF10: 4

## 2023-08-13 ASSESSMENT — PAIN - FUNCTIONAL ASSESSMENT: PAIN_FUNCTIONAL_ASSESSMENT: ACTIVITIES ARE NOT PREVENTED

## 2023-08-13 NOTE — PLAN OF CARE
Problem: Gastrointestinal - Adult  Goal: Maintains or returns to baseline bowel function  Outcome: Progressing   Pt continues to have incontinent loose-soft bowel movements. Pt reports increased pain in rectum that is relieved with ordered Anusol. Pt. Exhibiting fixated thoughts on moving bowels, this RN continues to reassure him that he is able to have BM on his own with no assistance. MD Cynthia Ingram aware of GI issues. Pt. To have KUB as ordered tomorrow due to frequent stools and noted distension and reported tenderness. Will cont to monitor.

## 2023-08-13 NOTE — PLAN OF CARE
Problem: Safety - Adult  Goal: Free from fall injury  Outcome: Progressing  Flowsheets (Taken 8/13/2023 0842)  Free From Fall Injury: Instruct family/caregiver on patient safety   Pt is a High fall risk. See Delman Pages Fall Score and ABCDS Injury Risk assessments.   + Screening for Orthostasis and/or + High Fall Risk per FAJARDO/ABCDS: Explained fall risk precautions to pt and family and rationale behind their use to keep the patient safe. Pt bed is in low position, side rails up, call light and belongings are in reach. Fall wristband applied and present on pts wrist.  Bed alarm on. Pt encouraged to call for assistance. Will continue with hourly rounds for PO intake, pain needs, toileting and repositioning as needed. Problem: Skin/Tissue Integrity  Goal: Absence of new skin breakdown  Description: 1. Monitor for areas of redness and/or skin breakdown  2. Assess vascular access sites hourly  3. Every 4-6 hours minimum:  Change oxygen saturation probe site  4. Every 4-6 hours:  If on nasal continuous positive airway pressure, respiratory therapy assess nares and determine need for appliance change or resting period. Outcome: Progressing   Pt. W/ multiple areas of skin breakdown. Skin care protocol in place, wound care rn following. See doc flowsheets and LDA wound documentation. Problem: Metabolic/Fluid and Electrolytes - Adult  Goal: Electrolytes maintained within normal limits  Outcome: Progressing   Pt received sodium phos. As ordered and tolerated well. Drinking well and eating approx 50% of meals. Will cont to monitor electrolytes. Problem: Hematologic - Adult  Goal: Maintains hematologic stability  Outcome: Progressing   Patient's hemoglobin this AM:   Recent Labs     08/13/23  1618   HGB 8.5*     Patient's platelet count this AM:   Recent Labs     08/13/23  0429   PLT 12*    Thrombocytopenia Precautions in place. Patient showing no signs or symptoms of active bleeding.   Transfusion not indicated at this

## 2023-08-13 NOTE — PROGRESS NOTES
Clinical Pharmacy Progress Note    Vancomycin - Management by Pharmacy    Consult Date(s): 08/10/2023  Consulting Provider(s): Dr. Webster Farm / Plan  Febrile neutropenia- Vancomycin  Concurrent Antimicrobials: cefepime  Day of Vanc Therapy / Ordered Duration: 5  Current Dosing Method: Bayesian-Guided AUC Dosing  Therapeutic Goal: -600 mg/L*hr  Current Dose / Plan:   Pt currently on vancomycin 1000 mg IV q 12 hr   Random level today was 20.2, will decrease dose to 750 mg IV q12h this estimates an AUC of 538 mg/L*hr and a trough of 17.6 mg/L  Vancomycin random level ordered for 8/14 @ 0600  Will continue to monitor clinical condition and make adjustments to regimen as appropriate. Thank you for consulting pharmacy,    Avis Duong, [facility], PharmD  8/13/2023 11:33 AM       Interval update: N/A    Subjective/Objective:   Pastora Melendrez is a 74 y.o. male with a PMHx significant for relapsed metastatic prostate cancer, A-flutter, CHF, CKD, GERD presented from [facility] after abnormal hemoglobin level of 5. 7. who is admitted with abnormal labs. Pharmacy is consulted to vancomycin dosing. Ht Readings from Last 1 Encounters:   08/09/23 5' 9\" (1.753 m)     Wt Readings from Last 1 Encounters:   08/09/23 120 lb 4.8 oz (54.6 kg)     Current & Prior Antimicrobial Regimen(s):  Cefepime 2000 mg IV q 8 hr- (8/10-current)Vancomycin   1,000 mg q12h (8/11-8/13)   750 mg q12h 8/13    Vancomycin Level(s) / Doses:    Date Time Dose Type of Level / Level Interpretation   8/11 0555 750 mg x 1 Random/ 11.7   Increased to 1,000 mg q12h   8/13 0840 1,000 mg q12h  Random/ 20.2   Decreased dose to 750 mg q12h   Note: Serum levels collected for AUC-based dosing may be high if collected in close proximity to the dose administered. This is not necessarily indicative of toxicity.     Cultures & Sensitivities:    Date Site Micro Susceptibility / Result   8/9 Blood 2/2 NGTD    8/9 Throat culture

## 2023-08-13 NOTE — PROGRESS NOTES
Progress Note      Admit Date: 8/9/2023  Day: 5  Diet: ADULT DIET; Regular  ADULT ORAL NUTRITION SUPPLEMENT; Breakfast, Lunch, Dinner; Standard High Calorie/High Protein Oral Supplement    CC: Abnormal labs    Interval history: I saw Mr. Preet Lopez at bedside this AM. He seems to be in no acute distress although he is ill appearing. He doesn't complain of any new symptoms. He does endorse diffuse abdominal pain but he does say that this is not new and has been present for a while. He remained in normal sinus rhythm overnight and did not have any instances of fever. His repeat phosphate was 2.4 at 4:30 am and he was started on 10 mmol of Sodium Phosphate. IVPB. He denies nausea, vomiting, fever, chest pain, SOB, and/or chills. HPI: Chester Melo is a 74 y. o.male with a PMHx of relapsed metastatic prostate cancer, A-flutter, CHF, CKD, GERD presented from [facility] after abnormal hemoglobin level of 5.7. He was transferred here for blood transfusion. Patient has no complaints but a bit frustrated that he had to come in to get transfusion. He follows oncologist Dr. Patti Dumont [facility]). Patient states that he had his chemo 2 weeks ago and the next one is due in a week. Denies any fever, chills, CP, SOB at rest, N/V, lightheadedness, dizziness, abdominal pain, constipation, BRBPR, dark stool or any urinary changes but endorsees SOB with exertion and diarrhea after chemo. Of note, patient was recently discharged on 7/15, presented with similar labs and SOB. Was treated for sepsis secondary to pneumonia and was transfused 1 unit of blood. On arrival to the ED, patient was afebrile and hemodynamically stable, saturating well on room air. Labs were significant for elevated lactic acid of 2.3 and pancytopenic. Hyponatremic and hypoalbuminemia.      Medications:     Scheduled Meds:   sodium phosphate IVPB  10 mmol IntraVENous Once    amiodarone  200 mg Oral BID    metoprolol  5 mg IntraVENous Once    polyethylene glycol  17 and started on Eligard/Abiraterone Acetate. MRI in April 2023 showed bulky mets at T1,T2, T4, T11, T12, and L1. CT C/A/P in May 2023 showed mets to liver with numerous large lytic lesions extending into epidural space at L1, L2, L5 and S1. Once again received radiation and is s/p Carboplatin/Etoposide 6/19/23. He says he has another cycle of chemo in a week. - Cont. home morphine 15mg BID     Lactic Acidosis - resolved  - resolved after NS     Failure to thrive  - Low albumin, low BMI (17.77)  - Dietary supplements in meals     A. Flutter/A. Fib  [id number] Score for Atrial Fibrillation Stroke Risk    Risk   Factors   Component Value   C CHF No 1   H HTN No 0   A2 Age >= 76 Yes,  (67 y.o.) 2   D DM No 0   S2 Prior Stroke/TIA No 0   V Vascular Disease No 0   A Age 77-78 No,  (67 y.o.) 0   Sc Sex male 0     [id number]  Score   3   Score last updated 8/10/23 1:26 AM EDT     Click here for a link to the UpToDate guideline \"Atrial Fibrillation: Anticoagulation therapy to prevent embolization     Disclaimer: Risk Score calculation is dependent on accuracy of patient problem list and past encounter diagnosis. [id number] 3. TSH 1.32 (7.12.2023). - 8/11 Patient keeps going in and out of a.fib. Was tachycardic to 170s. IV Metoprolol and Stat EKG ordered. Metoprolol was held later due to BP 98/59.  - Keep K+ > 4.0 and Mg > 2.0              - Mg 1.9 today, replete              - K 4.7 today   - P 0.9, replete, repeat labs  - Amiodarone 150 mg in dextrose 5% 100 mL bolus given 0945 08/11/2023  - Amiodarone gtt 450 mg in D5% 250 mL 8/11/2023  - Cont home Metoprolol 50mg QD and amiodarone 200 mg BID per Cardiology  - TSH, LFTs stable     # Hyponatremia  Baseline sodium 128-133, currently at his baseline   - Monitor with daily BMPs     # Aortic regurgitation       Code Status: Limited   FEN: ADULT DIET;  Regular  ADULT ORAL NUTRITION SUPPLEMENT; Breakfast, Lunch, Dinner; Standard High Calorie/High Protein Oral

## 2023-08-14 ENCOUNTER — APPOINTMENT (OUTPATIENT)
Dept: GENERAL RADIOLOGY | Age: 73
End: 2023-08-14
Payer: MEDICARE

## 2023-08-14 PROBLEM — R14.0 ABDOMINAL DISTENTION: Status: ACTIVE | Noted: 2023-08-14

## 2023-08-14 LAB
ALBUMIN SERPL-MCNC: 2.3 G/DL (ref 3.4–5)
ANION GAP SERPL CALCULATED.3IONS-SCNC: 12 MMOL/L (ref 3–16)
ANISOCYTOSIS BLD QL SMEAR: ABNORMAL
BASOPHILS # BLD: 0 K/UL (ref 0–0.2)
BASOPHILS NFR BLD: 0 %
BLOOD BANK DISPENSE STATUS: NORMAL
BLOOD BANK PRODUCT CODE: NORMAL
BPU ID: NORMAL
BUN SERPL-MCNC: 33 MG/DL (ref 7–20)
CALCIUM SERPL-MCNC: 8.1 MG/DL (ref 8.3–10.6)
CHLORIDE SERPL-SCNC: 107 MMOL/L (ref 99–110)
CO2 SERPL-SCNC: 19 MMOL/L (ref 21–32)
CREAT SERPL-MCNC: 0.6 MG/DL (ref 0.8–1.3)
DACRYOCYTES BLD QL SMEAR: ABNORMAL
DEPRECATED RDW RBC AUTO: 20.1 % (ref 12.4–15.4)
DESCRIPTION BLOOD BANK: NORMAL
EOSINOPHIL # BLD: 0 K/UL (ref 0–0.6)
EOSINOPHIL NFR BLD: 0 %
GFR SERPLBLD CREATININE-BSD FMLA CKD-EPI: >60 ML/MIN/{1.73_M2}
GLUCOSE SERPL-MCNC: 101 MG/DL (ref 70–99)
HCT VFR BLD AUTO: 23.8 % (ref 40.5–52.5)
HGB BLD-MCNC: 8.1 G/DL (ref 13.5–17.5)
LYMPHOCYTES # BLD: 0.2 K/UL (ref 1–5.1)
LYMPHOCYTES NFR BLD: 9 %
MACROCYTES BLD QL SMEAR: ABNORMAL
MAGNESIUM SERPL-MCNC: 1.9 MG/DL (ref 1.8–2.4)
MCH RBC QN AUTO: 29.3 PG (ref 26–34)
MCHC RBC AUTO-ENTMCNC: 33.9 G/DL (ref 31–36)
MCV RBC AUTO: 86.4 FL (ref 80–100)
METAMYELOCYTES NFR BLD MANUAL: 1 %
MONOCYTES # BLD: 0 K/UL (ref 0–1.3)
MONOCYTES NFR BLD: 2 %
NEUTROPHILS # BLD: 1.7 K/UL (ref 1.7–7.7)
NEUTROPHILS NFR BLD: 85 %
NEUTS BAND NFR BLD MANUAL: 3 % (ref 0–7)
OVALOCYTES BLD QL SMEAR: ABNORMAL
PHOSPHATE SERPL-MCNC: 1.8 MG/DL (ref 2.5–4.9)
PLATELET # BLD AUTO: 7 K/UL (ref 135–450)
PMV BLD AUTO: 10.8 FL (ref 5–10.5)
POTASSIUM SERPL-SCNC: 4.7 MMOL/L (ref 3.5–5.1)
RBC # BLD AUTO: 2.75 M/UL (ref 4.2–5.9)
SODIUM SERPL-SCNC: 138 MMOL/L (ref 136–145)
VANCOMYCIN SERPL-MCNC: 25 UG/ML
WBC # BLD AUTO: 1.9 K/UL (ref 4–11)

## 2023-08-14 PROCEDURE — 83735 ASSAY OF MAGNESIUM: CPT

## 2023-08-14 PROCEDURE — 71045 X-RAY EXAM CHEST 1 VIEW: CPT

## 2023-08-14 PROCEDURE — 80069 RENAL FUNCTION PANEL: CPT

## 2023-08-14 PROCEDURE — P9036 PLATELET PHERESIS IRRADIATED: HCPCS

## 2023-08-14 PROCEDURE — 36430 TRANSFUSION BLD/BLD COMPNT: CPT

## 2023-08-14 PROCEDURE — 99232 SBSQ HOSP IP/OBS MODERATE 35: CPT | Performed by: INTERNAL MEDICINE

## 2023-08-14 PROCEDURE — 74018 RADEX ABDOMEN 1 VIEW: CPT

## 2023-08-14 PROCEDURE — 99233 SBSQ HOSP IP/OBS HIGH 50: CPT | Performed by: HOSPITALIST

## 2023-08-14 PROCEDURE — 6370000000 HC RX 637 (ALT 250 FOR IP): Performed by: INTERNAL MEDICINE

## 2023-08-14 PROCEDURE — 2580000003 HC RX 258

## 2023-08-14 PROCEDURE — 6360000002 HC RX W HCPCS

## 2023-08-14 PROCEDURE — 80202 ASSAY OF VANCOMYCIN: CPT

## 2023-08-14 PROCEDURE — 2060000000 HC ICU INTERMEDIATE R&B

## 2023-08-14 PROCEDURE — 6370000000 HC RX 637 (ALT 250 FOR IP)

## 2023-08-14 PROCEDURE — 2500000003 HC RX 250 WO HCPCS

## 2023-08-14 PROCEDURE — 85025 COMPLETE CBC W/AUTO DIFF WBC: CPT

## 2023-08-14 PROCEDURE — 6360000002 HC RX W HCPCS: Performed by: HOSPITALIST

## 2023-08-14 RX ORDER — SODIUM CHLORIDE 9 MG/ML
INJECTION, SOLUTION INTRAVENOUS PRN
Status: DISCONTINUED | OUTPATIENT
Start: 2023-08-14 | End: 2023-08-14

## 2023-08-14 RX ORDER — SENNOSIDES A AND B 8.6 MG/1
2 TABLET, FILM COATED ORAL DAILY
Status: DISCONTINUED | OUTPATIENT
Start: 2023-08-15 | End: 2023-08-17

## 2023-08-14 RX ORDER — MAGNESIUM SULFATE IN WATER 40 MG/ML
2000 INJECTION, SOLUTION INTRAVENOUS ONCE
Status: COMPLETED | OUTPATIENT
Start: 2023-08-14 | End: 2023-08-14

## 2023-08-14 RX ADMIN — HYDROCORTISONE 2.5%: 25 CREAM TOPICAL at 10:29

## 2023-08-14 RX ADMIN — AMIODARONE HYDROCHLORIDE 200 MG: 200 TABLET ORAL at 08:46

## 2023-08-14 RX ADMIN — Medication 1000 UNITS: at 09:06

## 2023-08-14 RX ADMIN — SODIUM PHOSPHATE, MONOBASIC, MONOHYDRATE AND SODIUM PHOSPHATE, DIBASIC, ANHYDROUS 30 MMOL: 142; 276 INJECTION, SOLUTION INTRAVENOUS at 08:22

## 2023-08-14 RX ADMIN — HYDROCORTISONE 2.5%: 25 CREAM TOPICAL at 20:36

## 2023-08-14 RX ADMIN — MORPHINE SULFATE 15 MG: 15 TABLET, FILM COATED, EXTENDED RELEASE ORAL at 08:47

## 2023-08-14 RX ADMIN — CALCIUM 500 MG: 500 TABLET ORAL at 08:46

## 2023-08-14 RX ADMIN — AMIODARONE HYDROCHLORIDE 200 MG: 200 TABLET ORAL at 20:30

## 2023-08-14 RX ADMIN — MORPHINE SULFATE 15 MG: 15 TABLET, FILM COATED, EXTENDED RELEASE ORAL at 20:30

## 2023-08-14 RX ADMIN — METOPROLOL SUCCINATE 50 MG: 50 TABLET, EXTENDED RELEASE ORAL at 08:47

## 2023-08-14 RX ADMIN — PANTOPRAZOLE SODIUM 40 MG: 20 TABLET, DELAYED RELEASE ORAL at 08:47

## 2023-08-14 RX ADMIN — VANCOMYCIN HYDROCHLORIDE 750 MG: 10 INJECTION, POWDER, LYOPHILIZED, FOR SOLUTION INTRAVENOUS at 12:12

## 2023-08-14 RX ADMIN — MULTIPLE VITAMINS W/ MINERALS TAB 1 TABLET: TAB at 08:46

## 2023-08-14 RX ADMIN — PREDNISONE 40 MG: 20 TABLET ORAL at 08:46

## 2023-08-14 RX ADMIN — CEFEPIME 2000 MG: 2 INJECTION, POWDER, FOR SOLUTION INTRAVENOUS at 03:48

## 2023-08-14 RX ADMIN — VANCOMYCIN HYDROCHLORIDE 750 MG: 10 INJECTION, POWDER, LYOPHILIZED, FOR SOLUTION INTRAVENOUS at 23:13

## 2023-08-14 RX ADMIN — SODIUM CHLORIDE, PRESERVATIVE FREE 10 ML: 5 INJECTION INTRAVENOUS at 08:48

## 2023-08-14 RX ADMIN — LATANOPROST 1 DROP: 50 SOLUTION OPHTHALMIC at 20:36

## 2023-08-14 RX ADMIN — CEFEPIME 2000 MG: 2 INJECTION, POWDER, FOR SOLUTION INTRAVENOUS at 20:35

## 2023-08-14 RX ADMIN — FILGRASTIM-AAFI 480 MCG: 480 INJECTION, SOLUTION SUBCUTANEOUS at 10:32

## 2023-08-14 RX ADMIN — CEFEPIME 2000 MG: 2 INJECTION, POWDER, FOR SOLUTION INTRAVENOUS at 13:17

## 2023-08-14 RX ADMIN — MAGNESIUM SULFATE HEPTAHYDRATE 2000 MG: 40 INJECTION, SOLUTION INTRAVENOUS at 10:47

## 2023-08-14 ASSESSMENT — PAIN - FUNCTIONAL ASSESSMENT: PAIN_FUNCTIONAL_ASSESSMENT: ACTIVITIES ARE NOT PREVENTED

## 2023-08-14 ASSESSMENT — PAIN DESCRIPTION - LOCATION
LOCATION: BUTTOCKS
LOCATION: BUTTOCKS

## 2023-08-14 ASSESSMENT — PAIN DESCRIPTION - PAIN TYPE: TYPE: ACUTE PAIN

## 2023-08-14 ASSESSMENT — PAIN DESCRIPTION - ONSET: ONSET: ON-GOING

## 2023-08-14 ASSESSMENT — PAIN SCALES - GENERAL: PAINLEVEL_OUTOF10: 2

## 2023-08-14 ASSESSMENT — PAIN DESCRIPTION - ORIENTATION: ORIENTATION: INNER

## 2023-08-14 ASSESSMENT — PAIN DESCRIPTION - DESCRIPTORS
DESCRIPTORS: DISCOMFORT;SORE
DESCRIPTORS: DISCOMFORT;SORE

## 2023-08-14 ASSESSMENT — PAIN DESCRIPTION - FREQUENCY: FREQUENCY: CONTINUOUS

## 2023-08-14 ASSESSMENT — PAIN SCALES - WONG BAKER: WONGBAKER_NUMERICALRESPONSE: 4

## 2023-08-14 NOTE — CONSULTS
[facility] [address] [facility]  Palliative Medicine Consultation Note      Date Of Admission:8/9/2023  Date of consult: 08/14/23  Seen by [facility] in the past:  No    Recommendations:        Met with pt at bedside, had voluntary discussion regarding advanced care planning. Pt demonstrates capacity. Pt has MO listed as his best friend of 61 years, Yeni Figueroa. Christy Ejs provided the [facility] with HCPKATIE and has been placed in chart. No alternate agent listed. Discussed code status, pt listed as yes to all interventions EXCEPT shocking. Discussed with patient that during CPR, inability to deliver shocks may impede ability of other CPR interventions to be effective. I encouraged him to think about code status, pt reports he will think this over more. Will f/u tmrw. Discussed cancer tx and chemo with pt, he reports desire to continue aggressive tx and chemotherapy. He demonstrates good insight into his disease and that it is metastatic and severe and that chemo may eventually become ineffective per his discussion with Dr. Devyn Parkinson with [facility]. However, he is hopeful, feels okay, and appears to be in good spirits at the moment. 1. Goals of Care/Advanced Care planning/Code status: Limited x1 (NO to shock, yes to others). HCPOA is friend Yeni Figueroa (in physical chart). Continue chemo/aggressive mgmt. Good insight into cancer and prognosis. 2. Pain: denies  3. SOB: denies  4. Disposition: likely d/c when pancytopenia improved and infx address, likely d/c to home? Reason for Consult:         [x]  Goals of Care  [x]  Code Status Discussion/Advanced Care Planning   []  Psychosocial/Family Support  []  Symptom Management  []  Other (Specify)    Requesting Physician: Dr. Wendy Ruiz MD    CHIEF COMPLAINT:  abnl labs    History Obtained From:  patient, electronic medical record    History of Present Illness:         Tima Bob is a 74 y.o. male with PMH of relapsed metastatic prostate cancer, A-flutter, CHF, CKD.

## 2023-08-14 NOTE — CARE COORDINATION
Addendum at 2:16pm: Received voicemail from Justen at [facility] stating they can accept and to call her at [phone] or [phone]. Perfect serve sent to MD who states to start precert possibly Wednesday pending progress. MD aware will start precert once updated therapy notes are in. Called and spoke with pt's friend/POA Radha Shah. Radha Shah updated that [facility] able to accept and awaiting insurance authorization. Radha Shah inquiring about transportation to his Chemo and SW explained would confirm pt can get Chemo while at SNF as often this is on hold. Radha Shah also directed to call Justen to discuss potential long term care and aware if insurance denies SNF level of care, then it would be private pay. Spoke with Justen at the [facility] who states pt can get Chemo while at SNF as this has to be authorized prior. Justen states that pt will be in a semi-private room alone and aware pt likely will need to transition to long term care which she stated she figured this; no issues noted. She is aware Radha Shah was inquiring about an update on transportation. Spoke with Zhao Bah PT and requested updated therapy notes. Per PT unable to see pt today as he is a co-treat and no OT at this time. Requested pt to be seen ASAP in the AM for updated notes for precert    Addendum at 25:57VH: Received call from Tony Srinivasan at [facility] inquiring about referral to the lodge. She stated she hasn't spoken with family. Spoke with pt and Radha Shah, healthcare POA at bedside per request of RN. Pt and Radha Shah wanted an update on the status of the referral to the [facility] as per \"their owner whom they know, facility is awaiting update from \". Explained writer is awaiting a call from Justen, markos which a call was placed this AM. Radha Shah and pt aware that transportation is a barrier for SNF's. Radha Shah called Justen, admissions and requested SW leave a VM; another message left.  Pt/Charly aware that once an accepting SNF is obtained then

## 2023-08-14 NOTE — PROGRESS NOTES
Follow up with wound nurse thru phone , states that she will see the patient tomorrow for wound consult. Patient resting comfortably this time. VSS, a@ o x 4, afebrile, satting % in RA. Repositined frequently to help with the pressure injury from his sacrum and bilateral buttocks. Patient still having several bowel movements today( soft and brown in color). Held the stool softener for today. Bed alarm on, call bell within reach.

## 2023-08-14 NOTE — PLAN OF CARE
Problem: Skin/Tissue Integrity  Goal: Absence of new skin breakdown  Description: 1. Monitor for areas of redness and/or skin breakdown  2. Assess vascular access sites hourly  3. Every 4-6 hours minimum:  Change oxygen saturation probe site  4. Every 4-6 hours:  If on nasal continuous positive airway pressure, respiratory therapy assess nares and determine need for appliance change or resting period. Outcome: Progressing- patient turned every 2 hours. Assessed urinary catheter and bowel movements frequently, and changed as needed. Problem: Gastrointestinal - Adult  Goal: Maintains or returns to baseline bowel function  Outcome: Progressing- patient had multiple loose BMs during the shift. Plan of care ongoing. Problem: Metabolic/Fluid and Electrolytes - Adult  Goal: Electrolytes maintained within normal limits  Outcome: Progressing- patient received magnesium and phosphorus replacements. Problem: Hematologic - Adult  Goal: Maintains hematologic stability  Outcome: ProgressingPatient's hemoglobin this AM:   Recent Labs     08/14/23  0351   HGB 8.1*     Patient's platelet count this AM:   Recent Labs     08/14/23  0351   PLT 7*    Thrombocytopenia Precautions in place. Patient showing no signs or symptoms of active bleeding. Patient transfused blood products per orders - see flowsheet. Patient verbalizes understanding of all instructions. Will continue to assess and implement POC. Call light within reach and hourly rounding in place.

## 2023-08-14 NOTE — PLAN OF CARE
Problem: Discharge Planning  Goal: Discharge to home or other facility with appropriate resources  8/14/2023 0124 by Shankar Gonzalez RN  Outcome: Progressing     Problem: Pain  Goal: Verbalizes/displays adequate comfort level or baseline comfort level  8/14/2023 0124 by Shankar Gonzalez RN  Outcome: Progressing     Problem: Chronic Conditions and Co-morbidities  Goal: Patient's chronic conditions and co-morbidity symptoms are monitored and maintained or improved  8/14/2023 0124 by Shankar Gonzalez RN  Outcome: Progressing     Problem: Safety - Adult  Goal: Free from fall injury  8/14/2023 0124 by Shankar Gonzalez RN  Outcome: Progressing     Problem: Skin/Tissue Integrity  Goal: Absence of new skin breakdown  Description: 1. Monitor for areas of redness and/or skin breakdown  2. Assess vascular access sites hourly  3. Every 4-6 hours minimum:  Change oxygen saturation probe site  4. Every 4-6 hours:  If on nasal continuous positive airway pressure, respiratory therapy assess nares and determine need for appliance change or resting period.   8/14/2023 0124 by Shankar Gonzalez RN  Outcome: Progressing     Problem: Nutrition Deficit:  Goal: Optimize nutritional status  8/14/2023 0124 by Shankar Gonzalez RN  Outcome: Progressing     Problem: Gastrointestinal - Adult  Goal: Maintains or returns to baseline bowel function  8/14/2023 0124 by Shankar Gonzalez RN  Outcome: Progressing     Problem: Metabolic/Fluid and Electrolytes - Adult  Goal: Electrolytes maintained within normal limits  8/14/2023 0124 by Shankar Gonzalez RN  Outcome: Progressing     Problem: Hematologic - Adult  Goal: Maintains hematologic stability  8/14/2023 0124 by Shankar Gonzalez RN  Outcome: Progressing     Problem: Respiratory - Adult  Goal: Achieves optimal ventilation and oxygenation  8/14/2023 0124 by Shankar Gonzalez RN  Outcome: Progressing

## 2023-08-14 NOTE — PROGRESS NOTES
Patient seen and assessed for weekly line care needs. CVC site remains free of visible signs and symptoms of infection. No drainage, edema, erythema, pain, itching or warmth noted at and around the insertion site. Line care performed by Staff RN. The need for continued use of the CVC is due to ongoing therapy. Patient verbalizes understanding of line care education provided by line care RN. Staff RNs will continue to monitor and assess the CVC site throughout the patient's hospital stay. Sterile dressing changes will continue to be changed per policy.     Signed by Joycelyn Lin RN on 8/14/2023 at 12:04 PM

## 2023-08-14 NOTE — PROGRESS NOTES
Comprehensive Nutrition Assessment    RECOMMENDATIONS:  PO Diet: Continue Regular  ONS: Modify Ensure +HP (straw) from TID to BID  Consider wound healing supplement  Nutrition Education: No recommendation at this time       NUTRITION ASSESSMENT:   Nutritional summary & status: Follow-up. Pt w/ variable po intake, averaging ~50% at most meals. Pt reports enjoying ONS and has been drinking 1-2 daily. Will modify order. RD encouraged pt to drink ONS between meals. Pt agreeable. Will continue to monitor per [facility]. Admission/PMH: Acute anemia//Prostate Cancer, GERD, CKD, CHF    MALNUTRITION ASSESSMENT  Context of Malnutrition: Chronic Illness   Malnutrition Status: Severe malnutrition  Findings of the 6 clinical characteristics of malnutrition (Minimum of 2 out of 6 clinical characteristics is required to make the diagnosis of moderate or severe Protein Calorie Malnutrition based on AND/ASPEN Guidelines):  Energy Intake:  75% or less estimated energy requirements for 1 month or longer  Weight Loss:  20% over 1 year     Body Fat Loss:  Severe body fat loss Orbital, Triceps, Buccal region   Muscle Mass Loss:  Severe muscle mass loss Temples (temporalis), Clavicles (pectoralis & deltoids), Thigh (quadraceps), Calf (gastrocnemius)  Fluid Accumulation:  No significant fluid accumulation     Strength:  Not Performed    NUTRITION DIAGNOSIS   Severe malnutrition related to catabolic illness, inadequate protein-energy intake as evidenced by Criteria as identified in malnutrition assessment    Nutrition Monitoring and Evaluation:   Food/Nutrient Intake Outcomes:  Food and Nutrient Intake, Supplement Intake  Physical Signs/Symptoms Outcomes:  Biochemical Data     OBJECTIVE DATA: Significant to nutrition assessment  Nutrition Related Findings: +bm 8/13; no edema; phos 1.8  Wounds: Stage II (coccyx)  Nutrition Goals: PO intake 75% or greater, prior to discharge     CURRENT NUTRITION THERAPIES  ADULT DIET;  Regular  ADULT

## 2023-08-14 NOTE — PROGRESS NOTES
Clinical Pharmacy Progress Note    Vancomycin - Management by Pharmacy    Consult Date(s): 08/10/2023  Consulting Provider(s): Dr. Kelvin Moritz / Plan  Febrile neutropenia- Vancomycin  Concurrent Antimicrobials: cefepime  Day of Vanc Therapy / Ordered Duration: 6  Current Dosing Method: Bayesian-Guided AUC Dosing  Therapeutic Goal: -600 mg/L*hr  Current Dose / Plan:   Continue vancomycin 750 mg q12h, random today was 25 mg/L   AUC of 478 mg/L*hr and a trough of 15.1 mg/L  Will re-evaluate need for random level in 2-3 days  Will continue to monitor clinical condition and make adjustments to regimen as appropriate. Thank you for consulting pharmacy,    Clovis Damon, [address] [facility], PharmD  8/14/2023 8:19 AM       Interval update: pt slightly hypotensive (104/70)    Subjective/Objective:   Tima Bob is a 74 y.o. male with a PMHx significant for relapsed metastatic prostate cancer, A-flutter, CHF, CKD, GERD [facility] after abnormal hemoglobin level of 5. 7. who is admitted with abnormal labs. Pharmacy is consulted to vancomycin dosing. Ht Readings from Last 1 Encounters:   08/09/23 5' 9\" (1.753 m)     Wt Readings from Last 1 Encounters:   08/09/23 120 lb 4.8 oz (54.6 kg)     Current & Prior Antimicrobial Regimen(s):  Cefepime 2000 mg IV q 8 hr- (8/10-current)Vancomycin   1,000 mg q12h (8/11-8/13)   750 mg q12h 8/13-current    Vancomycin Level(s) / Doses:    Date Time Dose Type of Level / Level Interpretation   8/11 0555 750 mg x 1 Random/ 11.7   Increased to 1,000 mg q12h   8/13 0840 1,000 mg q12h  Random/ 20.2   Decreased dose to 750 mg q12h   8/14 0351 750 mg q12h  Random/ 25   Continue current dose     Note: Serum levels collected for AUC-based dosing may be high if collected in close proximity to the dose administered. This is not necessarily indicative of toxicity.     Cultures & Sensitivities:    Date Site Micro Susceptibility / Result   8/9

## 2023-08-15 LAB
ALBUMIN SERPL-MCNC: 2.4 G/DL (ref 3.4–5)
ANION GAP SERPL CALCULATED.3IONS-SCNC: 8 MMOL/L (ref 3–16)
ANISOCYTOSIS BLD QL SMEAR: ABNORMAL
BASOPHILS # BLD: 0 K/UL (ref 0–0.2)
BASOPHILS NFR BLD: 0 %
BUN SERPL-MCNC: 30 MG/DL (ref 7–20)
CALCIUM SERPL-MCNC: 8.2 MG/DL (ref 8.3–10.6)
CHLORIDE SERPL-SCNC: 109 MMOL/L (ref 99–110)
CO2 SERPL-SCNC: 24 MMOL/L (ref 21–32)
CREAT SERPL-MCNC: 0.7 MG/DL (ref 0.8–1.3)
DACRYOCYTES BLD QL SMEAR: ABNORMAL
DEPRECATED RDW RBC AUTO: 20 % (ref 12.4–15.4)
EOSINOPHIL # BLD: 0 K/UL (ref 0–0.6)
EOSINOPHIL NFR BLD: 0 %
GFR SERPLBLD CREATININE-BSD FMLA CKD-EPI: >60 ML/MIN/{1.73_M2}
GLUCOSE SERPL-MCNC: 100 MG/DL (ref 70–99)
HCT VFR BLD AUTO: 22.5 % (ref 40.5–52.5)
HGB BLD-MCNC: 7.6 G/DL (ref 13.5–17.5)
LYMPHOCYTES # BLD: 0.1 K/UL (ref 1–5.1)
LYMPHOCYTES NFR BLD: 2 %
MACROCYTES BLD QL SMEAR: ABNORMAL
MAGNESIUM SERPL-MCNC: 1.9 MG/DL (ref 1.8–2.4)
MCH RBC QN AUTO: 29.6 PG (ref 26–34)
MCHC RBC AUTO-ENTMCNC: 33.7 G/DL (ref 31–36)
MCV RBC AUTO: 87.8 FL (ref 80–100)
MONOCYTES # BLD: 0.2 K/UL (ref 0–1.3)
MONOCYTES NFR BLD: 6 %
MYELOCYTES NFR BLD MANUAL: 1 %
NEUTROPHILS # BLD: 3.3 K/UL (ref 1.7–7.7)
NEUTROPHILS NFR BLD: 87 %
NEUTS BAND NFR BLD MANUAL: 4 % (ref 0–7)
OVALOCYTES BLD QL SMEAR: ABNORMAL
PHOSPHATE SERPL-MCNC: 2.2 MG/DL (ref 2.5–4.9)
PLATELET # BLD AUTO: 28 K/UL (ref 135–450)
PLATELET BLD QL SMEAR: ABNORMAL
PMV BLD AUTO: 8.2 FL (ref 5–10.5)
POTASSIUM SERPL-SCNC: 4.8 MMOL/L (ref 3.5–5.1)
RBC # BLD AUTO: 2.56 M/UL (ref 4.2–5.9)
SLIDE REVIEW: ABNORMAL
SODIUM SERPL-SCNC: 141 MMOL/L (ref 136–145)
WBC # BLD AUTO: 3.6 K/UL (ref 4–11)

## 2023-08-15 PROCEDURE — 36591 DRAW BLOOD OFF VENOUS DEVICE: CPT

## 2023-08-15 PROCEDURE — 83735 ASSAY OF MAGNESIUM: CPT

## 2023-08-15 PROCEDURE — 6360000002 HC RX W HCPCS

## 2023-08-15 PROCEDURE — 97116 GAIT TRAINING THERAPY: CPT

## 2023-08-15 PROCEDURE — 6360000002 HC RX W HCPCS: Performed by: HOSPITALIST

## 2023-08-15 PROCEDURE — 97535 SELF CARE MNGMENT TRAINING: CPT

## 2023-08-15 PROCEDURE — 99232 SBSQ HOSP IP/OBS MODERATE 35: CPT | Performed by: INTERNAL MEDICINE

## 2023-08-15 PROCEDURE — 97530 THERAPEUTIC ACTIVITIES: CPT

## 2023-08-15 PROCEDURE — 6370000000 HC RX 637 (ALT 250 FOR IP): Performed by: INTERNAL MEDICINE

## 2023-08-15 PROCEDURE — 2580000003 HC RX 258

## 2023-08-15 PROCEDURE — 85025 COMPLETE CBC W/AUTO DIFF WBC: CPT

## 2023-08-15 PROCEDURE — 99232 SBSQ HOSP IP/OBS MODERATE 35: CPT | Performed by: HOSPITALIST

## 2023-08-15 PROCEDURE — 6370000000 HC RX 637 (ALT 250 FOR IP)

## 2023-08-15 PROCEDURE — 80069 RENAL FUNCTION PANEL: CPT

## 2023-08-15 PROCEDURE — 2060000000 HC ICU INTERMEDIATE R&B

## 2023-08-15 PROCEDURE — 2500000003 HC RX 250 WO HCPCS

## 2023-08-15 RX ORDER — MAGNESIUM SULFATE IN WATER 40 MG/ML
2000 INJECTION, SOLUTION INTRAVENOUS ONCE
Status: COMPLETED | OUTPATIENT
Start: 2023-08-15 | End: 2023-08-15

## 2023-08-15 RX ORDER — AMIODARONE HYDROCHLORIDE 200 MG/1
200 TABLET ORAL DAILY
Status: DISCONTINUED | OUTPATIENT
Start: 2023-08-16 | End: 2023-08-24 | Stop reason: HOSPADM

## 2023-08-15 RX ORDER — MAGNESIUM SULFATE IN WATER 40 MG/ML
2000 INJECTION, SOLUTION INTRAVENOUS ONCE
Status: DISCONTINUED | OUTPATIENT
Start: 2023-08-15 | End: 2023-08-15 | Stop reason: CLARIF

## 2023-08-15 RX ADMIN — SODIUM CHLORIDE, PRESERVATIVE FREE 10 ML: 5 INJECTION INTRAVENOUS at 20:24

## 2023-08-15 RX ADMIN — PANTOPRAZOLE SODIUM 40 MG: 20 TABLET, DELAYED RELEASE ORAL at 06:04

## 2023-08-15 RX ADMIN — MULTIPLE VITAMINS W/ MINERALS TAB 1 TABLET: TAB at 10:06

## 2023-08-15 RX ADMIN — MORPHINE SULFATE 15 MG: 15 TABLET, FILM COATED, EXTENDED RELEASE ORAL at 10:06

## 2023-08-15 RX ADMIN — HYDROCORTISONE 2.5%: 25 CREAM TOPICAL at 20:25

## 2023-08-15 RX ADMIN — SODIUM PHOSPHATE, MONOBASIC, MONOHYDRATE AND SODIUM PHOSPHATE, DIBASIC, ANHYDROUS 15 MMOL: 142; 276 INJECTION, SOLUTION INTRAVENOUS at 10:11

## 2023-08-15 RX ADMIN — CEFEPIME 2000 MG: 2 INJECTION, POWDER, FOR SOLUTION INTRAVENOUS at 06:04

## 2023-08-15 RX ADMIN — METOPROLOL SUCCINATE 50 MG: 50 TABLET, EXTENDED RELEASE ORAL at 10:06

## 2023-08-15 RX ADMIN — CALCIUM 500 MG: 500 TABLET ORAL at 10:06

## 2023-08-15 RX ADMIN — MAGNESIUM SULFATE HEPTAHYDRATE 2000 MG: 40 INJECTION, SOLUTION INTRAVENOUS at 10:17

## 2023-08-15 RX ADMIN — AMIODARONE HYDROCHLORIDE 200 MG: 200 TABLET ORAL at 10:06

## 2023-08-15 RX ADMIN — Medication 1000 UNITS: at 10:06

## 2023-08-15 RX ADMIN — SODIUM CHLORIDE, PRESERVATIVE FREE 10 ML: 5 INJECTION INTRAVENOUS at 10:07

## 2023-08-15 RX ADMIN — CEFEPIME 2000 MG: 2 INJECTION, POWDER, FOR SOLUTION INTRAVENOUS at 15:13

## 2023-08-15 RX ADMIN — CEFEPIME 2000 MG: 2 INJECTION, POWDER, FOR SOLUTION INTRAVENOUS at 20:24

## 2023-08-15 RX ADMIN — VANCOMYCIN HYDROCHLORIDE 750 MG: 10 INJECTION, POWDER, LYOPHILIZED, FOR SOLUTION INTRAVENOUS at 15:03

## 2023-08-15 RX ADMIN — HYDROCORTISONE 2.5%: 25 CREAM TOPICAL at 10:07

## 2023-08-15 RX ADMIN — MORPHINE SULFATE 15 MG: 15 TABLET, FILM COATED, EXTENDED RELEASE ORAL at 20:26

## 2023-08-15 RX ADMIN — FILGRASTIM-AAFI 480 MCG: 480 INJECTION, SOLUTION SUBCUTANEOUS at 10:03

## 2023-08-15 RX ADMIN — MAGNESIUM SULFATE HEPTAHYDRATE 2000 MG: 40 INJECTION, SOLUTION INTRAVENOUS at 10:25

## 2023-08-15 RX ADMIN — LATANOPROST 1 DROP: 50 SOLUTION OPHTHALMIC at 20:25

## 2023-08-15 RX ADMIN — PREDNISONE 40 MG: 20 TABLET ORAL at 10:06

## 2023-08-15 ASSESSMENT — PAIN DESCRIPTION - LOCATION: LOCATION: BUTTOCKS

## 2023-08-15 ASSESSMENT — PAIN SCALES - GENERAL
PAINLEVEL_OUTOF10: 0
PAINLEVEL_OUTOF10: 0
PAINLEVEL_OUTOF10: 2

## 2023-08-15 ASSESSMENT — PAIN DESCRIPTION - PAIN TYPE: TYPE: ACUTE PAIN

## 2023-08-15 ASSESSMENT — PAIN - FUNCTIONAL ASSESSMENT: PAIN_FUNCTIONAL_ASSESSMENT: PREVENTS OR INTERFERES SOME ACTIVE ACTIVITIES AND ADLS

## 2023-08-15 ASSESSMENT — PAIN DESCRIPTION - DESCRIPTORS: DESCRIPTORS: ACHING;SORE

## 2023-08-15 ASSESSMENT — PAIN DESCRIPTION - ORIENTATION: ORIENTATION: POSTERIOR;LOWER

## 2023-08-15 ASSESSMENT — PAIN DESCRIPTION - FREQUENCY: FREQUENCY: INTERMITTENT

## 2023-08-15 ASSESSMENT — PAIN SCALES - WONG BAKER: WONGBAKER_NUMERICALRESPONSE: 0

## 2023-08-15 ASSESSMENT — PAIN DESCRIPTION - ONSET: ONSET: SUDDEN

## 2023-08-15 NOTE — PROGRESS NOTES
Occupational Therapy  Facility/Department: [address] [facility]  Daily Treatment Note  NAME: Law Griffiths  : 1950  MRN: [id number]    Date of Service: 8/15/2023    Discharge Recommendations: Law Griffiths scored a  on the AM-PAC ADL Inpatient form. Current research shows that an AM-PAC score of 17 or less is typically not associated with a discharge to the patient's home setting. Based on the patient's AM-PAC score and their current ADL deficits, it is recommended that the patient have 3-5 sessions per week of Occupational Therapy at d/c to increase the patient's independence. Please see assessment section for further patient specific details. If patient discharges prior to next session this note will serve as a discharge summary. Please see below for the latest assessment towards goals. Patient Diagnosis(es): The encounter diagnosis was Pancytopenia ([address]). Assessment      Assessment: Pt tolerated session well. Able to ambulated a little further compared to last session with less assistance. Sitll continuing to need assist of 2 during ambulation for safety with constant cueing. Demo significant fatigue and poor endurance after short bouts of activity and would benefit from cont OT to work on ADLs, funct mob and transfers. Cont with POC      Activity Tolerance: Patient tolerated treatment well      Plan   Occupational Therapy Plan  Times Per Week: 2-5     Restrictions  Position Activity Restriction  Other position/activity restrictions: up with A        Subjective   Subjective  Subjective: Pt in bed upon arrival. Agreeable to OT  Pain: Complain the same soreness/wound in buttocks. RN aware           Objective    Vitals  HR in the 130s-140s after short activity.  Gets better with extended rest. RN aware      Bed Mobility Training  Bed Mobility Training: Yes  Supine to Sit: Additional time;Assist X1;Moderate assistance      Balance  Sitting: Intact  Standing: With support (RW- Min A)      Transfer Training  Transfer Training: Yes  Sit to Stand:  (Mod A x2 (EOB), Min A x2 (chair), Mod A x1 (chair))  Stand to Sit: Minimum assistance      Gait  Overall Level of Assistance: Minimum assistance;Assist X2;Additional time  Interventions: Visual cues; Verbal cues  Distance (ft): 5 Feet  Assistive Device: Gait belt;Walker, rolling         ADL  Feeding: Setup  Toileting: Dependent/Total (for toileting hygiene after BM on bedpan)                Safety Devices  Type of Devices: Left in chair;Chair alarm in place;Call light within reach;Nurse notified     Patient Education  Education Given To: Patient  Education Provided: Role of Therapy;Plan of Care;ADL Adaptive Strategies;Transfer Training; Fall Prevention Strategies; Energy Conservation  Education Method: Verbal  Education Outcome: Verbalized understanding;Continued education needed    Goals  Short Term Goals  Time Frame for Short Term Goals: discharge  Short Term Goal 1: stand step transfer to BSC/chair with LRAD and min A  Short Term Goal 2: sit<>Stand transfers with min A  Short Term Goal 3: tolerate standing 2 minutes during ADL/mobility  Short Term Goal 4: complete 10 reps HEP for UE strengthening/endurance       Therapy Time   Individual Concurrent Group Co-treatment   Time In 0836         Time Out 0919         Minutes 43         Timed Code Treatment Minutes: KENISHA Mackey/DARYN

## 2023-08-15 NOTE — PLAN OF CARE
Problem: Pain  Goal: Verbalizes/displays adequate comfort level or baseline comfort level  Outcome: Progressing  Flowsheets (Taken 8/15/2023 1557)  Verbalizes/displays adequate comfort level or baseline comfort level:   Encourage patient to monitor pain and request assistance   Assess pain using appropriate pain scale  Note: Pt denies pain, agrees to make needs known. Tolerates position changes with encouragement. Problem: Safety - Adult  Goal: Free from fall injury  Outcome: Progressing  Flowsheets (Taken 8/15/2023 1557)  Free From Fall Injury: Instruct family/caregiver on patient safety  Note: Pt agrees to use call light to make needs known.

## 2023-08-15 NOTE — PROGRESS NOTES
Physical Therapy  Daily Treatment Note      Discharge Recommendations: Helga Nelson scored a 11/24 on the AM-PAC short mobility form. Current research shows that an AM-PAC score of 17 or less is typically not associated with a discharge to the patient's home setting. Based on the patient's AM-PAC score and their current functional mobility deficits, it is recommended that the patient have 3-5 sessions per week of Physical Therapy at d/c to increase the patient's independence. Please see assessment section for further patient specific details. Assessment:  Mobility weak, slow and effortful. Needing up to 2 person assist for safe mobility at this time. Good effort and participation despite c/o feeling tired and sore bottom. Pt is currently functioning well below his baseline at this time. Limited by fatigue, weakness, discomfort, tachycardia. Would benefit from continued IP PT at D/C prior to returning home. Plan is for SNF. Equipment Needs: Defer to next level of care    Chart Reviewed: Yes     Other position/activity restrictions: up with A   Additional Pertinent Hx: Adm 8/9, 74 y.o. male who presents to the emergency department for blood transfusion. Presents from [facility]. He had a hemoglobin of 5.7. PMHx of relapsed metastatic prostate cancer, A-flutter, CHF, CKD, GERD. Pressure sores on buttocks. Diagnosis: acute anemia   Treatment Diagnosis: impaired mobility    Subjective: Pt in bed initially. Agreeable to working with therapy and getting OOB. \"Whatever you think I should do. \"  C/o feeling tired. \"I didn't sleep well last night. \"    Pain: c/o sore bottom (not new.) Not rated. RN aware and has been addressing. Waffle cushion in chair for pressure relief. Objective:    Bed mobility  Rolling:  To R (twice) and L with Min assist x 1  Supine to sit: Mod assist x 1, HOB up partially  Scooting: Min assist to EOB    Transfers  Sit to stand: Dependent (Mod assist x 2) from bed; Dependent

## 2023-08-15 NOTE — PROGRESS NOTES
Clinical Pharmacy Progress Note    Vancomycin - Management by Pharmacy    Consult Date(s): 08/10/2023  Consulting Provider(s): Dr. Loc Vasques / Plan  Febrile neutropenia- Vancomycin  Concurrent Antimicrobials: cefepime  Day of Vanc Therapy / Ordered Duration: 7  Current Dosing Method: Bayesian-Guided AUC Dosing  Therapeutic Goal: -600 mg/L*hr  Current Dose / Plan:   Continue vancomycin 750 mg q12h   AUC of 536 mg/L*hr and a trough of 17.5 mg/L  Vancomycin is set to end today, will not order further random levels     Will continue to monitor clinical condition and make adjustments to regimen as appropriate. Thank you for consulting pharmacy,    Jaquan Solano, [facility], PharmD  8/15/2023 9:47 AM       Interval update: N/A    Subjective/Objective:   Brad Pedraza is a 74 y.o. male with a PMHx significant for relapsed metastatic prostate cancer, A-flutter, CHF, CKD, GERD presented from [facility] after abnormal hemoglobin level of 5. 7. who is admitted with abnormal labs. Pharmacy is consulted to vancomycin dosing. Ht Readings from Last 1 Encounters:   08/09/23 5' 9\" (1.753 m)     Wt Readings from Last 1 Encounters:   08/09/23 120 lb 4.8 oz (54.6 kg)     Current & Prior Antimicrobial Regimen(s):  Cefepime 2000 mg IV q 8 hr- (8/10-current)Vancomycin   1,000 mg q12h (8/11-8/13)   750 mg q12h 8/13-current    Vancomycin Level(s) / Doses:    Date Time Dose Type of Level / Level Interpretation   8/11 0555 750 mg x 1 Random/ 11.7   Increased to 1,000 mg q12h   8/13 0840 1,000 mg q12h  Random/ 20.2   Decreased dose to 750 mg q12h   8/14 0351 750 mg q12h  Random/ 25   Continue current dose     Note: Serum levels collected for AUC-based dosing may be high if collected in close proximity to the dose administered. This is not necessarily indicative of toxicity.     Cultures & Sensitivities:    Date Site Micro Susceptibility / Result   8/9 Blood 2/2 NGTD    8/9 Throat

## 2023-08-15 NOTE — PROGRESS NOTES
Palliative Care Chart Review  and Check in Note:     NAME:  Shellie Santiago Date: 8/9/2023  Hospital Day:  Hospital Day: 7   Current Code status: Limited    Palliative care is continuing to following Mr. Bo Monsivais for symptom management,  and goals of care discussion as needed. Patient's chart reviewed today 8/15/23. Seen at bedside, feeling ok. Patient respectfully did not want to discuss goals of care. The following are the currently established goals/code status, and Symptom management.      Goals of care: cont chemo, tx side effects as possible    Code status: :Limited x1 - yes to all EXCEPT shocks    Discharge plan: tbd      Rosey Alpers, MD, PGY-2  08/15/23  3:25 PM

## 2023-08-15 NOTE — PROGRESS NOTES
Physician Progress Note      PATIENT:               Camille Abel  [facility] #:                  [id number]  :                       1950  ADMIT DATE:       2023 4:32 PM  [address] [facility] DATE:  RESPONDING  PROVIDER #:        Claudeen Ruby MD          QUERY TEXT:    Pt admitted with abnormal labs and has anemia documented. If possible, please   document in progress notes and discharge summary further specificity regarding   the acuity and type of anemia:    The medical record reflects the following:  Risk Factors: 73 y/o with prostate cancer and chemo treatments  Clinical Indicators: Per  PN: \"? Pancytopenia and Anemia Ddx: Chemotherapy,   chronic disease, infection, medication-induced  Patient was admitted for HGB of 5.7, WBC of 0.1, and PLT of 17. He was   transfused 2 units of blood. HGB is now?8.1: Prostate cancer w mets: received   radiation and is s/p Carboplatin/Etoposide 23. He says he has another   cycle of chemo in a week. Treatment: Trend H/H, Oncology consult, Dexamethasone, blood transfusion  Options provided:  -- Anemia due to prostate cancer and  antineoplastic chemotherapy  -- Anemia due to prostate cancer  -- Anemia due to antineoplastic chemotherapy  -- Other - I will add my own diagnosis  -- Disagree - Not applicable / Not valid  -- Disagree - Clinically unable to determine / Unknown  -- Refer to Clinical Documentation Reviewer    PROVIDER RESPONSE TEXT:    This patient has anemia due to prostate cancer and antineoplastic   chemotherapy. Query created by: Rebekah Sanchez on 2023 3:28 PM      QUERY TEXT:    Patient admitted with abnormal labs. Noted to have severe malnutrition by   Dietician consultant in  PN. If possible, please document in progress   notes and discharge summary if you are evaluating and /or treating any of the   following:       The medical record reflects the following:  Risk Factors: Prostate cancer  Clinical Indicators: Deitician PN:  Malnutrition Status:

## 2023-08-15 NOTE — PROGRESS NOTES
Requested to re-evaluate pressure injuries on buttocks. Pt currently up in chair. Wound Care to return at a later time to evaluate.

## 2023-08-15 NOTE — CARE COORDINATION
Addendum at 3:48pm: Spoke with Dr. Summer Akins about the lodge accepting and precert would be started tomorrow per Dr. Elvia Martel request. Dr. Summer Akins states will speak with Dr. Garrett Kaur tomorrow and notify SW if precert being started should be delayed. Addendum at 1:47pm: Received notification from Juan Pablo Gallagher [facility] APRN-CNP that next appt is Monday 8/21. Addison at the [facility] notified and states will determine frequency    Addendum at 1:40pm: Confirmed with Dr. Garrett Kaur via perfect serve to start precert tomorrow with him knowing it could take 24 hours if not back immediately. Received call from [address] at [facility]; updated her on the above and that per CNP note with [facility], pt goes to [place] location. [address] is requesting next appointments to arrange transportation. She is aware precert will be started tomorrow per MD request.     Perfect serve sent to Juan Pablo Gallagher with [facility] who states will let SW know next appointments. 10:42am: Received voicemail from [address] at the [facility] asking if pt could get his Chemo at [place] rather than [place] to keep him locally. Perfect serve sent to Juan Pablo Gallagher, VIVEK-CHIP with [facility] who states pt would need to switch MD's to switch locations such as [facility] and [place] is only for blood cancers. Return message left for Addison to update. Spoke with PT who states they saw pt and notes will be in shortly. MD requested SW start precert on Wed yesterday.      SW will follow    MAKEDA Finch   for [facility] ([address])  Office Phone: [phone] [address]: [phone]

## 2023-08-16 PROBLEM — A04.72 C. DIFFICILE DIARRHEA: Status: ACTIVE | Noted: 2023-08-16

## 2023-08-16 LAB
ABO + RH BLD: NORMAL
ALBUMIN SERPL-MCNC: 2.4 G/DL (ref 3.4–5)
ANION GAP SERPL CALCULATED.3IONS-SCNC: 9 MMOL/L (ref 3–16)
ANISOCYTOSIS BLD QL SMEAR: ABNORMAL
BASOPHILS # BLD: 0 K/UL (ref 0–0.2)
BASOPHILS NFR BLD: 0.1 %
BLD GP AB SCN SERPL QL: NORMAL
BLOOD BANK DISPENSE STATUS: NORMAL
BLOOD BANK PRODUCT CODE: NORMAL
BPU ID: NORMAL
BUN SERPL-MCNC: 27 MG/DL (ref 7–20)
C DIFF TOX A+B STL QL IA: ABNORMAL
CALCIUM SERPL-MCNC: 8.2 MG/DL (ref 8.3–10.6)
CHLORIDE SERPL-SCNC: 106 MMOL/L (ref 99–110)
CO2 SERPL-SCNC: 23 MMOL/L (ref 21–32)
CREAT SERPL-MCNC: 0.6 MG/DL (ref 0.8–1.3)
DACRYOCYTES BLD QL SMEAR: ABNORMAL
DEPRECATED RDW RBC AUTO: 20.1 % (ref 12.4–15.4)
DESCRIPTION BLOOD BANK: NORMAL
EOSINOPHIL # BLD: 0 K/UL (ref 0–0.6)
EOSINOPHIL NFR BLD: 0.1 %
FERRITIN SERPL IA-MCNC: 3445 NG/ML (ref 30–400)
FOLATE SERPL-MCNC: 16.07 NG/ML (ref 4.78–24.2)
FUNGUS SPEC CULT: ABNORMAL
GFR SERPLBLD CREATININE-BSD FMLA CKD-EPI: >60 ML/MIN/{1.73_M2}
GLUCOSE SERPL-MCNC: 120 MG/DL (ref 70–99)
HCT VFR BLD AUTO: 20.9 % (ref 40.5–52.5)
HCT VFR BLD AUTO: 25.4 % (ref 40.5–52.5)
HGB BLD-MCNC: 7.1 G/DL (ref 13.5–17.5)
HGB BLD-MCNC: 8.7 G/DL (ref 13.5–17.5)
IRON SATN MFR SERPL: 27 % (ref 20–50)
IRON SERPL-MCNC: 47 UG/DL (ref 59–158)
LOEFFLER MB STN SPEC: ABNORMAL
LYMPHOCYTES # BLD: 0 K/UL (ref 1–5.1)
LYMPHOCYTES NFR BLD: 0.9 %
MAGNESIUM SERPL-MCNC: 1.9 MG/DL (ref 1.8–2.4)
MCH RBC QN AUTO: 29.8 PG (ref 26–34)
MCHC RBC AUTO-ENTMCNC: 34.1 G/DL (ref 31–36)
MCV RBC AUTO: 87.3 FL (ref 80–100)
MICROCYTES BLD QL SMEAR: ABNORMAL
MONOCYTES # BLD: 0.5 K/UL (ref 0–1.3)
MONOCYTES NFR BLD: 8.6 %
NEUTROPHILS # BLD: 4.9 K/UL (ref 1.7–7.7)
NEUTROPHILS NFR BLD: 90.3 %
ORGANISM: ABNORMAL
OVALOCYTES BLD QL SMEAR: ABNORMAL
PHOSPHATE SERPL-MCNC: 2.4 MG/DL (ref 2.5–4.9)
PLATELET # BLD AUTO: 15 K/UL (ref 135–450)
PLATELET BLD QL SMEAR: ABNORMAL
PMV BLD AUTO: 9.3 FL (ref 5–10.5)
POTASSIUM SERPL-SCNC: 4.3 MMOL/L (ref 3.5–5.1)
RBC # BLD AUTO: 2.4 M/UL (ref 4.2–5.9)
SCHISTOCYTES BLD QL SMEAR: ABNORMAL
SLIDE REVIEW: ABNORMAL
SODIUM SERPL-SCNC: 138 MMOL/L (ref 136–145)
STOMATOCYTES BLD QL SMEAR: ABNORMAL
TIBC SERPL-MCNC: 174 UG/DL (ref 260–445)
VIT B12 SERPL-MCNC: >2000 PG/ML (ref 211–911)
WBC # BLD AUTO: 5.4 K/UL (ref 4–11)

## 2023-08-16 PROCEDURE — 2060000000 HC ICU INTERMEDIATE R&B

## 2023-08-16 PROCEDURE — 82607 VITAMIN B-12: CPT

## 2023-08-16 PROCEDURE — 6370000000 HC RX 637 (ALT 250 FOR IP): Performed by: INTERNAL MEDICINE

## 2023-08-16 PROCEDURE — 2580000003 HC RX 258

## 2023-08-16 PROCEDURE — 84238 ASSAY NONENDOCRINE RECEPTOR: CPT

## 2023-08-16 PROCEDURE — 36430 TRANSFUSION BLD/BLD COMPNT: CPT

## 2023-08-16 PROCEDURE — 87324 CLOSTRIDIUM AG IA: CPT

## 2023-08-16 PROCEDURE — 87449 NOS EACH ORGANISM AG IA: CPT

## 2023-08-16 PROCEDURE — 86900 BLOOD TYPING SEROLOGIC ABO: CPT

## 2023-08-16 PROCEDURE — 85018 HEMOGLOBIN: CPT

## 2023-08-16 PROCEDURE — 82728 ASSAY OF FERRITIN: CPT

## 2023-08-16 PROCEDURE — 97530 THERAPEUTIC ACTIVITIES: CPT

## 2023-08-16 PROCEDURE — 6360000002 HC RX W HCPCS

## 2023-08-16 PROCEDURE — 85014 HEMATOCRIT: CPT

## 2023-08-16 PROCEDURE — 82746 ASSAY OF FOLIC ACID SERUM: CPT

## 2023-08-16 PROCEDURE — 83550 IRON BINDING TEST: CPT

## 2023-08-16 PROCEDURE — 86850 RBC ANTIBODY SCREEN: CPT

## 2023-08-16 PROCEDURE — 80069 RENAL FUNCTION PANEL: CPT

## 2023-08-16 PROCEDURE — 83540 ASSAY OF IRON: CPT

## 2023-08-16 PROCEDURE — 6370000000 HC RX 637 (ALT 250 FOR IP)

## 2023-08-16 PROCEDURE — 36415 COLL VENOUS BLD VENIPUNCTURE: CPT

## 2023-08-16 PROCEDURE — 36591 DRAW BLOOD OFF VENOUS DEVICE: CPT

## 2023-08-16 PROCEDURE — 86923 COMPATIBILITY TEST ELECTRIC: CPT

## 2023-08-16 PROCEDURE — 83735 ASSAY OF MAGNESIUM: CPT

## 2023-08-16 PROCEDURE — 99232 SBSQ HOSP IP/OBS MODERATE 35: CPT | Performed by: INTERNAL MEDICINE

## 2023-08-16 PROCEDURE — 85025 COMPLETE CBC W/AUTO DIFF WBC: CPT

## 2023-08-16 PROCEDURE — 97116 GAIT TRAINING THERAPY: CPT

## 2023-08-16 PROCEDURE — 99233 SBSQ HOSP IP/OBS HIGH 50: CPT | Performed by: HOSPITALIST

## 2023-08-16 PROCEDURE — P9040 RBC LEUKOREDUCED IRRADIATED: HCPCS

## 2023-08-16 PROCEDURE — P9036 PLATELET PHERESIS IRRADIATED: HCPCS

## 2023-08-16 PROCEDURE — 86901 BLOOD TYPING SEROLOGIC RH(D): CPT

## 2023-08-16 RX ORDER — MAGNESIUM SULFATE IN WATER 40 MG/ML
2000 INJECTION, SOLUTION INTRAVENOUS ONCE
Status: COMPLETED | OUTPATIENT
Start: 2023-08-16 | End: 2023-08-16

## 2023-08-16 RX ORDER — SODIUM CHLORIDE 9 MG/ML
INJECTION, SOLUTION INTRAVENOUS PRN
Status: DISCONTINUED | OUTPATIENT
Start: 2023-08-16 | End: 2023-08-24 | Stop reason: HOSPADM

## 2023-08-16 RX ADMIN — SODIUM CHLORIDE, PRESERVATIVE FREE 10 ML: 5 INJECTION INTRAVENOUS at 09:58

## 2023-08-16 RX ADMIN — CALCIUM 500 MG: 500 TABLET ORAL at 09:57

## 2023-08-16 RX ADMIN — AMIODARONE HYDROCHLORIDE 200 MG: 200 TABLET ORAL at 09:57

## 2023-08-16 RX ADMIN — HYDROCORTISONE 2.5%: 25 CREAM TOPICAL at 20:01

## 2023-08-16 RX ADMIN — Medication 125 MG: at 17:47

## 2023-08-16 RX ADMIN — MULTIPLE VITAMINS W/ MINERALS TAB 1 TABLET: TAB at 09:57

## 2023-08-16 RX ADMIN — Medication 125 MG: at 12:17

## 2023-08-16 RX ADMIN — PREDNISONE 40 MG: 20 TABLET ORAL at 09:57

## 2023-08-16 RX ADMIN — SODIUM CHLORIDE, PRESERVATIVE FREE 10 ML: 5 INJECTION INTRAVENOUS at 20:08

## 2023-08-16 RX ADMIN — METOPROLOL SUCCINATE 50 MG: 50 TABLET, EXTENDED RELEASE ORAL at 09:57

## 2023-08-16 RX ADMIN — MAGNESIUM SULFATE HEPTAHYDRATE 2000 MG: 40 INJECTION, SOLUTION INTRAVENOUS at 12:09

## 2023-08-16 RX ADMIN — VANCOMYCIN HYDROCHLORIDE 750 MG: 10 INJECTION, POWDER, LYOPHILIZED, FOR SOLUTION INTRAVENOUS at 00:24

## 2023-08-16 RX ADMIN — LATANOPROST 1 DROP: 50 SOLUTION OPHTHALMIC at 20:08

## 2023-08-16 RX ADMIN — PANTOPRAZOLE SODIUM 40 MG: 20 TABLET, DELAYED RELEASE ORAL at 06:36

## 2023-08-16 RX ADMIN — HYDROCORTISONE 2.5%: 25 CREAM TOPICAL at 10:10

## 2023-08-16 RX ADMIN — Medication 125 MG: at 23:39

## 2023-08-16 RX ADMIN — MORPHINE SULFATE 15 MG: 15 TABLET, FILM COATED, EXTENDED RELEASE ORAL at 09:57

## 2023-08-16 RX ADMIN — MORPHINE SULFATE 15 MG: 15 TABLET, FILM COATED, EXTENDED RELEASE ORAL at 20:07

## 2023-08-16 RX ADMIN — Medication 125 MG: at 06:36

## 2023-08-16 RX ADMIN — Medication 1000 UNITS: at 09:57

## 2023-08-16 NOTE — PLAN OF CARE
Problem: Skin/Tissue Integrity  Goal: Absence of new skin breakdown  Description: 1. Monitor for areas of redness and/or skin breakdown  2. Assess vascular access sites hourly  3. Every 4-6 hours minimum:  Change oxygen saturation probe site  4. Every 4-6 hours:  If on nasal continuous positive airway pressure, respiratory therapy assess nares and determine need for appliance change or resting period. Outcome: Not Progressing   Patient q2 turned. Mepilex present. Triad cream applied per wound care orders.      Problem: Nutrition Deficit:  Goal: Optimize nutritional status  Outcome: Not Progressing  Nutrient intake appropriate for improving, restoring, or maintaining nutritional needs:   Assess nutritional status and recommend course of action   Monitor oral intake, labs, and treatment plans   Recommend appropriate diets, oral nutritional supplements, and vitamin/mineral supplements   Order, calculate, and assess calorie counts as needed     Problem: Pain  Goal: Verbalizes/displays adequate comfort level or baseline comfort level  Outcome: Progressing  Verbalizes/displays adequate comfort level or baseline comfort level:   Encourage patient to monitor pain and request assistance   Assess pain using appropriate pain scale   Administer analgesics based on type and severity of pain and evaluate response   Implement non-pharmacological measures as appropriate and evaluate response     Problem: Discharge Planning  Goal: Discharge to home or other facility with appropriate resources  Outcome: Progressing  Discharge to home or other facility with appropriate resources:   Identify barriers to discharge with patient and caregiver   Arrange for needed discharge resources and transportation as appropriate   Identify discharge learning needs (meds, wound care, etc)     Problem: Chronic Conditions and Co-morbidities  Goal: Patient's chronic conditions and co-morbidity symptoms are monitored and maintained or

## 2023-08-16 NOTE — PROGRESS NOTES
Physical Therapy  Facility/Department: [address]  Physical Therapy Treatment    Name: Merlin Angus  : 1950  MRN: [id number]  Date of Service: 2023    Discharge Recommendations: Merlin Angus scored a 13/24 on the AM-PAC short mobility form. Current research shows that an AM-PAC score of 17 or less is typically not associated with a discharge to the patient's home setting. Based on the patient's AM-PAC score and their current functional mobility deficits, it is recommended that the patient have 3-5 sessions per week of Physical Therapy at d/c to increase the patient's independence. Please see assessment section for further patient specific details. If patient discharges prior to next session this note will serve as a discharge summary. Please see below for the latest assessment towards goals. PT Equipment Recommendations  Equipment Needed: No (defer to SNF)      Patient Diagnosis(es): The encounter diagnosis was Pancytopenia ([address]). Past Medical History:  has a past medical history of Cancer ([address]), CHF (congestive heart failure) ([address]), Dupuytrens contracture, GERD (gastroesophageal reflux disease), Nocturia, and Reflux esophagitis. Past Surgical History:  has a past surgical history that includes eye surgery (); Finger trigger release (Left, 2013); Prostate surgery (10/2013); Colonoscopy ( & ); Colonoscopy (2018); Endoscopy, colon, diagnostic; Tonsillectomy; CT NEEDLE BIOPSY LIVER PERCUTANEOUS (2023); and Port Surgery (Right, 2023). Assessment   Assessment: HR up to 140s with standing & walking a few steps today. Fatigues very quickly & requires rest breaks. 1 goal met in therapy today. Pt was ambulating over 100' at SNF- currently is well below his baseline. Recommend ongoing IP therapy.   Treatment Diagnosis: impaired mobility  Therapy Prognosis: Guarded  Requires PT Follow-Up: Yes  Activity Tolerance  Activity Tolerance: Treatment limited secondary to medical complications  Activity Tolerance Comments: HR up to 142 with bed>chair & standing. HR 120s with sitting EOB.  at rest upon arrival.     Plan   Physcial Therapy Plan  General Plan:  (2-5)  Current Treatment Recommendations: Functional mobility training, Transfer training, Gait training, Patient/Caregiver education & training, Safety education & training, Strengthening  Safety Devices  Type of Devices: Left in chair, Chair alarm in place, Call light within reach, Nurse notified     Restrictions  Position Activity Restriction  Other position/activity restrictions: up with A     Subjective   General  Chart Reviewed: Yes  Additional Pertinent Hx: Adm 8/9, 74 y.o. male who presents to the emergency department for blood transfusion. Presents from [facility]. He had a hemoglobin of 5.7. PMHx of relapsed metastatic prostate cancer, A-flutter, CHF, CKD, GERD. Pressure sores on buttocks. Family / Caregiver Present: Yes (friend)  Referring Practitioner: Starr Erickson MD  Diagnosis: acute anemia  Follows Commands: Within Functional Limits  Subjective  Subjective: Found in bed, agreeable to PT. RN reports pt will be getting blood transfusion but ok to see prior. Pt c/o sore buttocks once in chair while sitting on cushion. RN called for pain meds. Social/Functional History  Social/Functional History  Type of Home: Facility  ADL Assistance: Needs assistance  Ambulation Assistance: Needs assistance (with walker- reports approx 160' was the longest he walked in [place] with A)  Transfer Assistance: Needs assistance  Additional Comments: Was getting PT/OT at SNF. Hasn't been home in 8-10 weeks. Was living alone at that time and could walk without a device & could shower/dress. Had assist with meals.        Cognition   Orientation  Overall Orientation Status: Within Functional Limits     Objective                        Bed mobility  Supine to Sit: Minimal assistance

## 2023-08-16 NOTE — PROGRESS NOTES
4 Eyes Skin Assessment     NAME:  Lida Stallings OF BIRTH:  1950  MEDICAL RECORD NUMBER:  [id number]    The patient is being assessed for  Shift Handoff    I agree that at least one RN has performed a thorough Head to Toe Skin Assessment on the patient. ALL assessment sites listed below have been assessed. Areas assessed by both nurses:    Head, Face, Ears, Shoulders, Back, Chest, Arms, Elbows, Hands, Sacrum. Buttock, Coccyx, Ischium, and Legs. Feet and Heels        Does the Patient have a Wound? Yes wound(s) were present on assessment.  LDA wound assessment was Initiated and completed by RN       Reddy Prevention initiated by RN: Yes  Wound Care Orders initiated by RN: Yes    Pressure Injury (Stage 3,4, Unstageable, DTI, NWPT, and Complex wounds) if present, place Wound referral order by RN under : Yes    New Ostomies, if present place, Ostomy referral order under : No     Nurse 1 eSignature: Electronically signed by Sally Jacinto RN on 8/16/23 at 5:33 AM EDT    **SHARE this note so that the co-signing nurse can place an eSignature**    Nurse 2 eSignature: {Esignature:[id number]}

## 2023-08-16 NOTE — PROGRESS NOTES
Occupational Therapy  Facility/Department: [address] [place] [address],[address]  Occupational Therapy Treatment  Name: Jhonny Miguel  : 1950  MRN: [id number]  Date of Service: 2023    Discharge Recommendations:  Jhonny Miguel scored a  on the AM-PAC ADL Inpatient form. Current research shows that an AM-PAC score of 17 or less is typically not associated with a discharge to the patient's home setting. Based on the patient's AM-PAC score and their current ADL deficits, it is recommended that the patient have 3-5 sessions per week of Occupational Therapy at d/c to increase the patient's independence. Please see assessment section for further patient specific details. If patient discharges prior to next session this note will serve as a discharge summary. Please see below for the latest assessment towards goals. OT Equipment Recommendations  Other: defer to d/c setting       Treatment Diagnosis: Impaired ADLs, mobility      Assessment   Performance deficits / Impairments: Decreased functional mobility ; Decreased ADL status; Decreased endurance;Decreased strength;Decreased balance;Decreased posture;Decreased high-level IADLs  Assessment: Pt requires assist of 2 for transfers with walker, able to stand 1 minute at walker with CGA. ADLs and mobility are limited by elevated HR to 140's and by pt fatigue. Pt is below is independent baseline status, recommend d/c to SNF for continued therapy prior to return home.   Treatment Diagnosis: Impaired ADLs, mobility  REQUIRES OT FOLLOW-UP: Yes  Activity Tolerance  Activity Tolerance: Patient limited by fatigue;Patient Tolerated treatment well        Plan   Occupational Therapy Plan  Times Per Week: 2-5  Current Treatment Recommendations: Strengthening, Balance training, Self-Care / ADL, Functional mobility training, Endurance training, Patient/Caregiver education & training, Safety education & training     Restrictions  Position Activity Restriction  Other transfers and brief standing- RN aware       Cognition  Cognition Comment: anxious- needs  positive reinforcement  Orientation  Overall Orientation Status: Within Functional Limits    Exercise: Pt educated on UE AROM exercises to maintain joint ROM and strength- pt verb understanding       Safety Devices  Type of Devices: Left in chair;Chair alarm in place;Call light within reach;Nurse notified    AM-PAC Score  AM-PAC Inpatient Daily Activity Raw Score: 12 (08/16/23 1218)  AM-PAC Inpatient ADL T-Scale Score : 30.6 (08/16/23 1218)  ADL Inpatient CMS 0-100% Score: 66.57 (08/16/23 1218)  ADL Inpatient CMS G-Code Modifier : CL (08/16/23 1218)    Goals  Short Term Goals  Time Frame for Short Term Goals: discharge  Short Term Goal 1: stand step transfer to BSC/chair with LRAD and min A  Short Term Goal 2: sit<>Stand transfers with min A  Short Term Goal 3: tolerate standing 2 minutes during ADL/mobility  Short Term Goal 4: complete 10 reps HEP for UE strengthening/endurance       Therapy Time   Individual Concurrent Group Co-treatment   Time In 1107         Time Out 1140         Minutes 33         Timed Code Treatment Minutes: [address], OT

## 2023-08-16 NOTE — PROGRESS NOTES
Patient had a medium BM that was brown with red tinge to it. Dr. Alexa Lacey MD notified via perfect serve.  No new orders at this time

## 2023-08-16 NOTE — CONSENT
Informed Consent for Blood Component Transfusion Note    I have discussed with the patient the rationale for blood component transfusion; its benefits in treating or preventing fatigue, organ damage, or death; and its risk which includes mild transfusion reactions, rare risk of blood borne infection, or more serious but rare reactions. I have discussed the alternatives to transfusion, including the risk and consequences of not receiving transfusion. The patient had an opportunity to ask questions and had agreed to proceed with transfusion of blood components.     Electronically signed by Stevie Jolley MD on 8/16/23 at 10:10 AM EDT

## 2023-08-16 NOTE — PLAN OF CARE
Problem: Skin/Tissue Integrity  Goal: Absence of new skin breakdown  Description: 1. Monitor for areas of redness and/or skin breakdown  2. Assess vascular access sites hourly  3. Every 4-6 hours minimum:  Change oxygen saturation probe site  4. Every 4-6 hours:  If on nasal continuous positive airway pressure, respiratory therapy assess nares and determine need for appliance change or resting period. Outcome: Progressing  Note: Wound consult in. Patient is a Q2 turn. All wounds assessed and charted. Problem: Nutrition Deficit:  Goal: Optimize nutritional status  Outcome: Progressing  Flowsheets (Taken 8/16/2023 1550)  Nutrient intake appropriate for improving, restoring, or maintaining nutritional needs:   Assess nutritional status and recommend course of action   Recommend appropriate diets, oral nutritional supplements, and vitamin/mineral supplements  Note: Encouraged patient to consume % of meals throughout shift. Patient consumed % of all meals as charted.       Problem: Metabolic/Fluid and Electrolytes - Adult  Goal: Electrolytes maintained within normal limits  Outcome: Progressing  Flowsheets (Taken 8/16/2023 1550)  Electrolytes maintained within normal limits:   Monitor labs and assess patient for signs and symptoms of electrolyte imbalances   Administer electrolyte replacement as ordered   Monitor response to electrolyte replacements, including repeat lab results as appropriate

## 2023-08-16 NOTE — CARE COORDINATION
Addendum at 12:08pm: Resident MD updated that precert is started and pending for the [facility]. 10:50am: Requested CM assistant to start precert on [employer] portal for the [facility] per request of Dr. Kelly Mckeon yesterday.      MAKEDA Gonzalez   for [facility] ([address])  Office Phone: [phone]  [facility] Mobile: [phone]

## 2023-08-16 NOTE — PROGRESS NOTES
Re-evaluated pt's buttocks and coccyx. Pt has pre-existing Stage 3 pressure injuries. Triad being applied to wounds. Pt has C. Diff and is incontinent of stool. No changes in wound care orders.

## 2023-08-16 NOTE — PROGRESS NOTES
Pt had small, formed, BM at 0037. C-diff stool sample collected and sent per order. Pt's c-diff lab resulted positive at [address] Dr. Maxwell Starr notified of positive result. Awaiting response.

## 2023-08-16 NOTE — CONSULTS
Clinical Pharmacy Progress Note    Vancomycin IV has been discontinued. Pharmacy will sign off. Please re-consult pharmacy if Vancomycin dosing is wanted in the future. Please call with questions.   Chetan Chatman, PharmD  PGY1 Resident   Ext. [phone]  8/16/2023 10:01 AM

## 2023-08-17 ENCOUNTER — APPOINTMENT (OUTPATIENT)
Dept: GENERAL RADIOLOGY | Age: 73
End: 2023-08-17
Payer: MEDICARE

## 2023-08-17 LAB
ALBUMIN SERPL-MCNC: 2.5 G/DL (ref 3.4–5)
ANION GAP SERPL CALCULATED.3IONS-SCNC: 8 MMOL/L (ref 3–16)
BASOPHILS # BLD: 0 K/UL (ref 0–0.2)
BASOPHILS NFR BLD: 0 %
BUN SERPL-MCNC: 26 MG/DL (ref 7–20)
CALCIUM SERPL-MCNC: 8.3 MG/DL (ref 8.3–10.6)
CHLORIDE SERPL-SCNC: 100 MMOL/L (ref 99–110)
CO2 SERPL-SCNC: 26 MMOL/L (ref 21–32)
CREAT SERPL-MCNC: 0.6 MG/DL (ref 0.8–1.3)
DEPRECATED RDW RBC AUTO: 18.1 % (ref 12.4–15.4)
EOSINOPHIL # BLD: 0 K/UL (ref 0–0.6)
EOSINOPHIL NFR BLD: 0 %
GFR SERPLBLD CREATININE-BSD FMLA CKD-EPI: >60 ML/MIN/{1.73_M2}
GLUCOSE SERPL-MCNC: 106 MG/DL (ref 70–99)
HCT VFR BLD AUTO: 24.3 % (ref 40.5–52.5)
HGB BLD-MCNC: 8.3 G/DL (ref 13.5–17.5)
LYMPHOCYTES # BLD: 0.3 K/UL (ref 1–5.1)
LYMPHOCYTES NFR BLD: 3 %
MAGNESIUM SERPL-MCNC: 1.9 MG/DL (ref 1.8–2.4)
MCH RBC QN AUTO: 29.7 PG (ref 26–34)
MCHC RBC AUTO-ENTMCNC: 34.2 G/DL (ref 31–36)
MCV RBC AUTO: 86.8 FL (ref 80–100)
METAMYELOCYTES NFR BLD MANUAL: 1 %
MONOCYTES # BLD: 0.2 K/UL (ref 0–1.3)
MONOCYTES NFR BLD: 2 %
MYELOCYTES NFR BLD MANUAL: 1 %
NEUTROPHILS # BLD: 8 K/UL (ref 1.7–7.7)
NEUTROPHILS NFR BLD: 89 %
NEUTS BAND NFR BLD MANUAL: 4 % (ref 0–7)
PHOSPHATE SERPL-MCNC: 2.7 MG/DL (ref 2.5–4.9)
PLATELET # BLD AUTO: 12 K/UL (ref 135–450)
PLATELET BLD QL SMEAR: ABNORMAL
PMV BLD AUTO: 9.9 FL (ref 5–10.5)
POTASSIUM SERPL-SCNC: 4.8 MMOL/L (ref 3.5–5.1)
RBC # BLD AUTO: 2.79 M/UL (ref 4.2–5.9)
RBC MORPH BLD: NORMAL
SLIDE REVIEW: ABNORMAL
SODIUM SERPL-SCNC: 134 MMOL/L (ref 136–145)
WBC # BLD AUTO: 8.4 K/UL (ref 4–11)

## 2023-08-17 PROCEDURE — 80069 RENAL FUNCTION PANEL: CPT

## 2023-08-17 PROCEDURE — 6370000000 HC RX 637 (ALT 250 FOR IP)

## 2023-08-17 PROCEDURE — 74018 RADEX ABDOMEN 1 VIEW: CPT

## 2023-08-17 PROCEDURE — 51798 US URINE CAPACITY MEASURE: CPT

## 2023-08-17 PROCEDURE — 2580000003 HC RX 258

## 2023-08-17 PROCEDURE — 97535 SELF CARE MNGMENT TRAINING: CPT

## 2023-08-17 PROCEDURE — 97530 THERAPEUTIC ACTIVITIES: CPT

## 2023-08-17 PROCEDURE — 99232 SBSQ HOSP IP/OBS MODERATE 35: CPT | Performed by: HOSPITALIST

## 2023-08-17 PROCEDURE — 97110 THERAPEUTIC EXERCISES: CPT

## 2023-08-17 PROCEDURE — 6370000000 HC RX 637 (ALT 250 FOR IP): Performed by: NURSE PRACTITIONER

## 2023-08-17 PROCEDURE — 97116 GAIT TRAINING THERAPY: CPT

## 2023-08-17 PROCEDURE — 83735 ASSAY OF MAGNESIUM: CPT

## 2023-08-17 PROCEDURE — 2060000000 HC ICU INTERMEDIATE R&B

## 2023-08-17 PROCEDURE — 85025 COMPLETE CBC W/AUTO DIFF WBC: CPT

## 2023-08-17 PROCEDURE — 6360000002 HC RX W HCPCS

## 2023-08-17 PROCEDURE — 6370000000 HC RX 637 (ALT 250 FOR IP): Performed by: INTERNAL MEDICINE

## 2023-08-17 RX ORDER — MAGNESIUM SULFATE IN WATER 40 MG/ML
2000 INJECTION, SOLUTION INTRAVENOUS ONCE
Status: COMPLETED | OUTPATIENT
Start: 2023-08-17 | End: 2023-08-17

## 2023-08-17 RX ADMIN — Medication 125 MG: at 05:49

## 2023-08-17 RX ADMIN — PANTOPRAZOLE SODIUM 40 MG: 20 TABLET, DELAYED RELEASE ORAL at 05:49

## 2023-08-17 RX ADMIN — LATANOPROST 1 DROP: 50 SOLUTION OPHTHALMIC at 20:41

## 2023-08-17 RX ADMIN — Medication 1000 UNITS: at 10:27

## 2023-08-17 RX ADMIN — AMIODARONE HYDROCHLORIDE 200 MG: 200 TABLET ORAL at 10:27

## 2023-08-17 RX ADMIN — HYDROCORTISONE 2.5%: 25 CREAM TOPICAL at 12:15

## 2023-08-17 RX ADMIN — MULTIPLE VITAMINS W/ MINERALS TAB 1 TABLET: TAB at 10:27

## 2023-08-17 RX ADMIN — MORPHINE SULFATE 15 MG: 15 TABLET, FILM COATED, EXTENDED RELEASE ORAL at 20:41

## 2023-08-17 RX ADMIN — MAGNESIUM SULFATE HEPTAHYDRATE 2000 MG: 40 INJECTION, SOLUTION INTRAVENOUS at 10:27

## 2023-08-17 RX ADMIN — PREDNISONE 30 MG: 20 TABLET ORAL at 10:27

## 2023-08-17 RX ADMIN — METOPROLOL SUCCINATE 50 MG: 50 TABLET, EXTENDED RELEASE ORAL at 10:27

## 2023-08-17 RX ADMIN — Medication 125 MG: at 12:15

## 2023-08-17 RX ADMIN — MORPHINE SULFATE 15 MG: 15 TABLET, FILM COATED, EXTENDED RELEASE ORAL at 10:27

## 2023-08-17 RX ADMIN — SODIUM CHLORIDE: 9 INJECTION, SOLUTION INTRAVENOUS at 10:25

## 2023-08-17 RX ADMIN — SODIUM CHLORIDE, PRESERVATIVE FREE 10 ML: 5 INJECTION INTRAVENOUS at 20:41

## 2023-08-17 RX ADMIN — Medication 125 MG: at 17:35

## 2023-08-17 RX ADMIN — CALCIUM 500 MG: 500 TABLET ORAL at 10:27

## 2023-08-17 RX ADMIN — HYDROCORTISONE 2.5%: 25 CREAM TOPICAL at 20:41

## 2023-08-17 RX ADMIN — SODIUM CHLORIDE, PRESERVATIVE FREE 10 ML: 5 INJECTION INTRAVENOUS at 10:28

## 2023-08-17 NOTE — PROGRESS NOTES
Diagnosis    Gastroesophageal reflux disease with esophagitis    Migraine headache    Prostate cancer metastatic to multiple sites [facility])    Left foot pain    Allergic sinusitis    Dupuytren's contracture of right hand    Dupuytren's contracture    Sciatica of left side    Increased frequency of urination    Stage 3a chronic kidney disease (HCC)    Positive test for genetic marker of susceptibility to malignant neoplasm of breast    Backache    Benign neoplasm of skin    Benign 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 difficile diarrhea       ASSESSMENT AND PLAN:    1. Metastatic prostate cancer -  - Follows w/ Dr Randa Encinas at [facility]  -initially diagnosed with an adenocarcinoma of the prostate, Scarlett score 6 in 9/2014  - He was treated with CyberKnife-- 6600 cGy to the pelvic mass between 4/15 - 6/02/2020   -He was then followed without therapy until the PSA reached 42.25 ng/mL on 10/04/2022.   A follow-up [facility] PET/CT on 11/16/2022 showed diffuse uptake in the prostate and widespread osseous metastatic disease with a DISPOSITION:  Per primary team     VIVEK Barnes - CNP  Please contact through [address]

## 2023-08-17 NOTE — CARE COORDINATION
Addendum at 1:55pm: CM Assistant scheduled pickup for 2:00pm via [id number] on 8/18. Addison at the [facility] aware of the this and stated no issues with accepting pt. Updated pt at bedside with PT/OT present. Pt updated on the above and that per MD, may discharge tomorrow so a 2pm pickup was scheduled. Pt stated agreement with this and aware if there is a cost or co-pay related to the transport, then he would receive a bill which he stated no issues with. He requested SW update Kacey Jhaveri. Pt was given his IMM    Called and spoke with Kacey Jhaveri, pt's healthcare POA. Updated on the above and that pt's isolation status wouldn't keep him in the hospital and MD is aware of his infection as he has been seeing pt. Kacey Jhaveri directed to call [employer] as he had questions for them. Kacey Jhaveri aware the lodge will send updates to [facility] for continued stay. Per request of Kacey Jhaveri, message left for Addison at the [facility] notifying of the c-diff isolation. RN aware    Addendum at 59:02HP: Precert approved for the [facility]. Authorization number [id number] from 8/17-8/23 with SNF admission within 72 hours. Perfect serve sent to Dr. Ade Solo to notify and he requested transportation be arranged for tomorrow. CM assistant aware and arranging transport    7:59FG: Precert remains pending on [employer] portal for the [facility].      RN aware and will update pt/friends at bedside    SW will continue to follow    MAKEDA Balderas   for [employer] and [address] [place] [address] ([address])  Office Phone: [phone] [address]: [phone]

## 2023-08-17 NOTE — PROGRESS NOTES
Physical Therapy  Daily Treatment Note    Discharge Recommendations: Marco Herman scored a 14/24 on the AM-PAC short mobility form. Current research shows that an AM-PAC score of 17 or less is typically not associated with a discharge to the patient's home setting. Based on the patient's AM-PAC score and their current functional mobility deficits, it is recommended that the patient have 3-5 sessions per week of Physical Therapy at d/c to increase the patient's independence. Please see assessment section for further patient specific details. Assessment:  Pt with improved activity tolerance this session. Pt continues to be tachycardic, but improved compared to last 2 sessions (only up to 111 bpm compared to ~140 bpm past 2 days.) Pt with good effort and participation overall. Needing rest breaks between activities due to increased HR and fatigue. Pt requires cues for safe transfers/hand placement. Needing up to Max assist x 1 for transfers today. Pt would benefit from continued IP PT at D/C prior to returning home. Plan is for SNF. Equipment Needs: Defer to next level of care    Chart Reviewed: Yes     Other position/activity restrictions: up with A   Additional Pertinent Hx: Adm 8/9, 74 y.o. male who presents to the emergency department for blood transfusion. Presents from [facility]. He had a hemoglobin of 5.7. PMHx of relapsed metastatic prostate cancer, A-flutter, CHF, CKD, GERD. Pressure sores on buttocks. Diagnosis: acute anemia   Treatment Diagnosis: impaired mobility    Subjective: Pt in bed initially. Agreeable to working with therapy. \"How am I doing? \"  They need to hook up the capacitor so I can urinate. (Re: condom catheter. Pt needing to be reminded several times during session that he could urinate when he needed to. Condom cath in place and functioning.)    Pain: No c/o voiced, except c/o sore bottom when repositioning in chair. Pressure relief cushion in chair.      Objective: Bed mobility  Supine to sit: Min assist x 1, HOB up partially with use of rail. Scooting: Min assist to EOB    Transfers  Sit to stand: Max assist x 1 from bed; Mod assist x 1 from chair (x 4 times). Cues (each time) for hand placement. Stand to sit: Min assist x 1 into chair (x 5 times). Cues for hand placement and controlled descent. Ambulation  Assistance Level: Min assist x 1  Assistive device: Wheeled walker  Distance: 4 ft, 8 ft, 6 ft x 2. Lengthy seated rests between walks. Quality of gait: Decreased step length/height; weak; unsteady at times; effortful; decreased pace; flexed upper trunk/neck. Exercises  While seated upright in chair: 2 sets x 10 reps B LAQ; 1 set x 10 reps B hip flexion  While reclined in chair: 10 reps B hip abd/add  Other: Occasional rest breaks. Balance  Sat EOB with CGA  Static stance with wheeled walker CGA  Ambulation with wheeled walker Min assist x 1    Vitals  Pt's HR at rest in bed (prior to activity) = 100 bpm. Up to 111 bpm with activity. Recovered to ~94 bpm with seated rest breaks. RN aware. Patient Education  Role of PT. Calling for assist with needs. Importance of activity and sitting up in chair. Expressed understanding. Safety Devices  Pt left with needs in reach. In chair (reclined) with chair alarm on. RN updated. AM-PAC score  AM-PAC Inpatient Mobility Raw Score : 14  AM-PAC Inpatient T-Scale Score : 38.1  Mobility Inpatient CMS 0-100% Score: 61.29  Mobility Inpatient CMS G-Code Modifier : CL    Goals: (as determined and assessed by primary PT)  Time Frame for Short Term Goals: dc  Short Term Goal 1: Supine<>sit min A with railing- MET 8/16. Revised:  Supine<>sit CGA with railing. Short Term Goal 2: Sit<>stand min A to walker   Short Term Goal 3: Ambulate 36' with RW min A  Short Term Goal 4: Demo independence with LE HEP 5-10 reps. - MET 8/17 but would benefit from ongoing strengthening.        Plan:  Plan: 2-5x/week  Current Treatment

## 2023-08-17 NOTE — PROGRESS NOTES
Occupational Therapy  Facility/Department: [address] [place] [address],[address]  Occupational Therapy Treatment    Name: James Flores  : 1950  MRN: [id number]  Date of Service: 2023    Discharge Recommendations:  James Flores scored a 13/24 on the AM-PAC ADL Inpatient form. Current research shows that an AM-PAC score of 17 or less is typically not associated with a discharge to the patient's home setting. Based on the patient's AM-PAC score and their current ADL deficits, it is recommended that the patient have 3-5 sessions per week of Occupational Therapy at d/c to increase the patient's independence. Please see assessment section for further patient specific details. If patient discharges prior to next session this note will serve as a discharge summary. Please see below for the latest assessment towards goals. OT Equipment Recommendations  Other: defer to d/c setting       Patient Diagnosis(es): The encounter diagnosis was Pancytopenia ([address]). Past Medical History:  has a past medical history of Cancer ([address]), CHF (congestive heart failure) ([address]), Dupuytrens contracture, GERD (gastroesophageal reflux disease), Nocturia, and Reflux esophagitis. Past Surgical History:  has a past surgical history that includes eye surgery (); Finger trigger release (Left, 2013); Prostate surgery (10/2013); Colonoscopy ( & ); Colonoscopy (2018); Endoscopy, colon, diagnostic; Tonsillectomy; CT NEEDLE BIOPSY LIVER PERCUTANEOUS (2023); and Port Surgery (Right, 2023). Treatment Diagnosis: Impaired ADLs, mobility      Assessment   Performance deficits / Impairments: Decreased functional mobility ; Decreased ADL status; Decreased endurance;Decreased strength;Decreased balance;Decreased posture;Decreased high-level IADLs  Assessment: Pt with improved HR and activity tolerance today, was able to walk short distances in room with RW and assist. Pt needs frequent rest breaks.  Pt required

## 2023-08-17 NOTE — DISCHARGE INSTR - COC
Margins Attached edges 23 1728   Number of days: 48       Wound 23 Perineum Inner;Left;Right; Lower scabbed wounds to bilateral buttocks (Active)   Wound Etiology Pressure Stage 3 23 1200   Dressing Status Other (Comment) 23 1200   Wound Cleansed Cleansed with saline 08/15/23 1945   Dressing/Treatment Triad hydro/zinc oxide-based hydrophilic paste /42/41 3880   Wound Assessment Pink/red 23 1200   Drainage Amount None (dry) 23 1200   Drainage Description Purulent 23 0949   Odor None 23 1200   Cindy-wound Assessment Intact 23 1200   Margins Attached edges 23 1200   Number of days: 48        Elimination:  Continence: Bowel: {YES / SW:06490}  Bladder: {YES / YQ:09698}  Urinary Catheter: {Urinary Catheter:719261650}   Colostomy/Ileostomy/Ileal Conduit: {YES / LB:58183}       Date of Last BM: ***    Intake/Output Summary (Last 24 hours) at 2023 1404  Last data filed at 2023 1200  Gross per 24 hour   Intake 690 ml   Output 1200 ml   Net -510 ml     I/O last 3 completed shifts:   In: 1892 [P.O.:1380; I.V.:512]  Out: 2575 [Urine:2575]    Safety Concerns:     [address] [place] [address] [employer] Safety Concerns:161631273}    Impairments/Disabilities:      [address] [place] [address] Impairments/Disabilities:866793433}    Nutrition Therapy:  Current Nutrition Therapy:   [address] [place] [address] [employer] Diet List:644335525}    Routes of Feeding: {CHP DME Other Feedings:455369890}  Liquids: {Slp liquid thickness:15420}  Daily Fluid Restriction: {CHP DME Yes amt example:064609144}  Last Modified Barium Swallow with Video (Video Swallowing Test): {Done Not Done GAXW:005662168}    Treatments at the Time of Hospital Discharge:   Respiratory Treatments: ***  Oxygen Therapy:  {Therapy; copd oxygen:33452}  Ventilator:    { CC Vent ZZZF:865032585}    Rehab Therapies: {THERAPEUTIC INTERVENTION:1558997769}  Weight Bearing Status/Restrictions: [address] [place] [address] Weight Bearin}  Other Medical Equipment (for information only, NOT a DME

## 2023-08-17 NOTE — PROGRESS NOTES
Pt resting with no complaints at this time. RR easy and unlabored. VSS. Multiple loose Bms overnight. PO vanc administered as ordered. Frequent repositioning. Bed locked and in lowest position. Pt updated on care plan/status. Will continue to monitor.

## 2023-08-18 ENCOUNTER — ANESTHESIA EVENT (OUTPATIENT)
Dept: ENDOSCOPY | Age: 73
End: 2023-08-18
Payer: MEDICARE

## 2023-08-18 PROBLEM — K92.1 HEMATOCHEZIA: Status: ACTIVE | Noted: 2023-08-18

## 2023-08-18 LAB
ALBUMIN SERPL-MCNC: 2.5 G/DL (ref 3.4–5)
ANION GAP SERPL CALCULATED.3IONS-SCNC: 10 MMOL/L (ref 3–16)
ANISOCYTOSIS BLD QL SMEAR: ABNORMAL
BASOPHILS # BLD: 0 K/UL (ref 0–0.2)
BASOPHILS NFR BLD: 0 %
BLOOD BANK DISPENSE STATUS: NORMAL
BLOOD BANK PRODUCT CODE: NORMAL
BPU ID: NORMAL
BUN SERPL-MCNC: 24 MG/DL (ref 7–20)
CALCIUM SERPL-MCNC: 8.2 MG/DL (ref 8.3–10.6)
CHLORIDE SERPL-SCNC: 98 MMOL/L (ref 99–110)
CO2 SERPL-SCNC: 24 MMOL/L (ref 21–32)
CREAT SERPL-MCNC: 0.6 MG/DL (ref 0.8–1.3)
DEPRECATED RDW RBC AUTO: 18.4 % (ref 12.4–15.4)
DESCRIPTION BLOOD BANK: NORMAL
EOSINOPHIL # BLD: 0 K/UL (ref 0–0.6)
EOSINOPHIL NFR BLD: 0 %
GFR SERPLBLD CREATININE-BSD FMLA CKD-EPI: >60 ML/MIN/{1.73_M2}
GLUCOSE SERPL-MCNC: 104 MG/DL (ref 70–99)
HCT VFR BLD AUTO: 21.7 % (ref 40.5–52.5)
HCT VFR BLD AUTO: 24.4 % (ref 40.5–52.5)
HCT VFR BLD AUTO: 24.9 % (ref 40.5–52.5)
HGB BLD-MCNC: 7.6 G/DL (ref 13.5–17.5)
HGB BLD-MCNC: 8.4 G/DL (ref 13.5–17.5)
HGB BLD-MCNC: 8.5 G/DL (ref 13.5–17.5)
INR PPP: 1.32 (ref 0.84–1.16)
LYMPHOCYTES # BLD: 0.8 K/UL (ref 1–5.1)
LYMPHOCYTES NFR BLD: 10 %
MAGNESIUM SERPL-MCNC: 1.9 MG/DL (ref 1.8–2.4)
MCH RBC QN AUTO: 29.9 PG (ref 26–34)
MCHC RBC AUTO-ENTMCNC: 34.3 G/DL (ref 31–36)
MCV RBC AUTO: 87 FL (ref 80–100)
MONOCYTES # BLD: 0.1 K/UL (ref 0–1.3)
MONOCYTES NFR BLD: 1 %
NEUTROPHILS # BLD: 7.4 K/UL (ref 1.7–7.7)
NEUTROPHILS NFR BLD: 87 %
NEUTS BAND NFR BLD MANUAL: 2 % (ref 0–7)
PHOSPHATE SERPL-MCNC: 3 MG/DL (ref 2.5–4.9)
PLATELET # BLD AUTO: 12 K/UL (ref 135–450)
PMV BLD AUTO: 9.8 FL (ref 5–10.5)
POTASSIUM SERPL-SCNC: 4.5 MMOL/L (ref 3.5–5.1)
PROTHROMBIN TIME: 16.3 SEC (ref 11.5–14.8)
RBC # BLD AUTO: 2.81 M/UL (ref 4.2–5.9)
SODIUM SERPL-SCNC: 132 MMOL/L (ref 136–145)
STFR SERPL-SCNC: 2.8 MG/L (ref 2.2–5)
WBC # BLD AUTO: 8.3 K/UL (ref 4–11)

## 2023-08-18 PROCEDURE — 6370000000 HC RX 637 (ALT 250 FOR IP)

## 2023-08-18 PROCEDURE — 36415 COLL VENOUS BLD VENIPUNCTURE: CPT

## 2023-08-18 PROCEDURE — 2580000003 HC RX 258

## 2023-08-18 PROCEDURE — 6370000000 HC RX 637 (ALT 250 FOR IP): Performed by: INTERNAL MEDICINE

## 2023-08-18 PROCEDURE — 97530 THERAPEUTIC ACTIVITIES: CPT

## 2023-08-18 PROCEDURE — 85025 COMPLETE CBC W/AUTO DIFF WBC: CPT

## 2023-08-18 PROCEDURE — 85018 HEMOGLOBIN: CPT

## 2023-08-18 PROCEDURE — 6370000000 HC RX 637 (ALT 250 FOR IP): Performed by: STUDENT IN AN ORGANIZED HEALTH CARE EDUCATION/TRAINING PROGRAM

## 2023-08-18 PROCEDURE — 97116 GAIT TRAINING THERAPY: CPT

## 2023-08-18 PROCEDURE — 80069 RENAL FUNCTION PANEL: CPT

## 2023-08-18 PROCEDURE — 97535 SELF CARE MNGMENT TRAINING: CPT

## 2023-08-18 PROCEDURE — 6370000000 HC RX 637 (ALT 250 FOR IP): Performed by: NURSE PRACTITIONER

## 2023-08-18 PROCEDURE — 6360000002 HC RX W HCPCS

## 2023-08-18 PROCEDURE — 2060000000 HC ICU INTERMEDIATE R&B

## 2023-08-18 PROCEDURE — 83735 ASSAY OF MAGNESIUM: CPT

## 2023-08-18 PROCEDURE — 85014 HEMATOCRIT: CPT

## 2023-08-18 PROCEDURE — 36430 TRANSFUSION BLD/BLD COMPNT: CPT

## 2023-08-18 PROCEDURE — 99222 1ST HOSP IP/OBS MODERATE 55: CPT | Performed by: HOSPITALIST

## 2023-08-18 PROCEDURE — 85610 PROTHROMBIN TIME: CPT

## 2023-08-18 RX ORDER — MAGNESIUM SULFATE IN WATER 40 MG/ML
2000 INJECTION, SOLUTION INTRAVENOUS ONCE
Status: COMPLETED | OUTPATIENT
Start: 2023-08-18 | End: 2023-08-18

## 2023-08-18 RX ORDER — PREDNISONE 20 MG/1
20 TABLET ORAL DAILY
Status: DISCONTINUED | OUTPATIENT
Start: 2023-08-19 | End: 2023-08-21

## 2023-08-18 RX ORDER — SODIUM CHLORIDE, SODIUM LACTATE, POTASSIUM CHLORIDE, CALCIUM CHLORIDE 600; 310; 30; 20 MG/100ML; MG/100ML; MG/100ML; MG/100ML
INJECTION, SOLUTION INTRAVENOUS CONTINUOUS
Status: ACTIVE | OUTPATIENT
Start: 2023-08-18 | End: 2023-08-18

## 2023-08-18 RX ORDER — PANTOPRAZOLE SODIUM 40 MG/10ML
40 INJECTION, POWDER, LYOPHILIZED, FOR SOLUTION INTRAVENOUS DAILY
Status: DISCONTINUED | OUTPATIENT
Start: 2023-08-19 | End: 2023-08-24 | Stop reason: HOSPADM

## 2023-08-18 RX ORDER — PANTOPRAZOLE SODIUM 40 MG/10ML
40 INJECTION, POWDER, LYOPHILIZED, FOR SOLUTION INTRAVENOUS 2 TIMES DAILY
Status: DISCONTINUED | OUTPATIENT
Start: 2023-08-18 | End: 2023-08-18

## 2023-08-18 RX ADMIN — LATANOPROST 1 DROP: 50 SOLUTION OPHTHALMIC at 20:55

## 2023-08-18 RX ADMIN — SODIUM CHLORIDE, POTASSIUM CHLORIDE, SODIUM LACTATE AND CALCIUM CHLORIDE: 600; 310; 30; 20 INJECTION, SOLUTION INTRAVENOUS at 11:27

## 2023-08-18 RX ADMIN — Medication 125 MG: at 18:58

## 2023-08-18 RX ADMIN — MAGNESIUM SULFATE HEPTAHYDRATE 2000 MG: 40 INJECTION, SOLUTION INTRAVENOUS at 09:50

## 2023-08-18 RX ADMIN — Medication 125 MG: at 11:32

## 2023-08-18 RX ADMIN — MORPHINE SULFATE 15 MG: 15 TABLET, FILM COATED, EXTENDED RELEASE ORAL at 09:51

## 2023-08-18 RX ADMIN — Medication 125 MG: at 23:55

## 2023-08-18 RX ADMIN — MULTIPLE VITAMINS W/ MINERALS TAB 1 TABLET: TAB at 09:51

## 2023-08-18 RX ADMIN — SODIUM CHLORIDE, PRESERVATIVE FREE 10 ML: 5 INJECTION INTRAVENOUS at 20:55

## 2023-08-18 RX ADMIN — HYDROCORTISONE 2.5%: 25 CREAM TOPICAL at 11:32

## 2023-08-18 RX ADMIN — Medication 125 MG: at 00:46

## 2023-08-18 RX ADMIN — PANTOPRAZOLE SODIUM 40 MG: 20 TABLET, DELAYED RELEASE ORAL at 06:56

## 2023-08-18 RX ADMIN — MORPHINE SULFATE 15 MG: 15 TABLET, FILM COATED, EXTENDED RELEASE ORAL at 20:55

## 2023-08-18 RX ADMIN — Medication 125 MG: at 06:56

## 2023-08-18 RX ADMIN — HYDROCORTISONE 2.5%: 25 CREAM TOPICAL at 20:54

## 2023-08-18 RX ADMIN — CALCIUM 500 MG: 500 TABLET ORAL at 09:51

## 2023-08-18 RX ADMIN — PREDNISONE 30 MG: 20 TABLET ORAL at 09:50

## 2023-08-18 RX ADMIN — POLYETHYLENE GLYCOL-3350 AND ELECTROLYTES 4000 ML: 236; 6.74; 5.86; 2.97; 22.74 POWDER, FOR SOLUTION ORAL at 14:18

## 2023-08-18 RX ADMIN — Medication 1000 UNITS: at 09:51

## 2023-08-18 RX ADMIN — SODIUM CHLORIDE, PRESERVATIVE FREE 10 ML: 5 INJECTION INTRAVENOUS at 09:52

## 2023-08-18 RX ADMIN — AMIODARONE HYDROCHLORIDE 200 MG: 200 TABLET ORAL at 10:01

## 2023-08-18 ASSESSMENT — PAIN SCALES - GENERAL
PAINLEVEL_OUTOF10: 0
PAINLEVEL_OUTOF10: 4

## 2023-08-18 ASSESSMENT — PAIN DESCRIPTION - ORIENTATION: ORIENTATION: LEFT;RIGHT;INNER

## 2023-08-18 ASSESSMENT — PAIN DESCRIPTION - LOCATION
LOCATION: BUTTOCKS
LOCATION: GENERALIZED

## 2023-08-18 ASSESSMENT — PAIN SCALES - WONG BAKER
WONGBAKER_NUMERICALRESPONSE: 0

## 2023-08-18 ASSESSMENT — PAIN DESCRIPTION - DESCRIPTORS
DESCRIPTORS: DISCOMFORT
DESCRIPTORS: DISCOMFORT;ACHING

## 2023-08-18 NOTE — PLAN OF CARE
Problem: Pain  Goal: Verbalizes/displays adequate comfort level or baseline comfort level  Outcome: Progressing  Note: Pt denies pain during shift. Turned q 2 see flow sheet. Patient encouraged to change positions frequently to decrease pressure. Problem: Safety - Adult  Goal: Free from fall injury  Outcome: Progressing  Note: Pt in bed with bed alarm on, instructed to call for assistance. Verbalized understanding. All fall precautions in place.

## 2023-08-18 NOTE — PROGRESS NOTES
Assistance (MOD A of 2 from bed; min A of 2 from chair with armrests; max A +Min A from low toilet with rail on R)  Stand to Sit: Moderate Assistance  Ambulation  Device: [address] [place] [address] Drive: Moderate assistance;Minimal assistance (min A of 2 for 2 walks & min/mod A for 1 walk due to increasing weakness)  Quality of Gait: weak and unsteady gait, flexed posture with downward gaze; flexed knees & tends to rush  Gait Deviations: Decreased step length;Decreased step height  Distance: 12' x 3  Comments: had to sit & rest in between each walk   Assisted on/off toilet during session & helped get new brief on. Balance  Standing - Static:  (SBA sitting EOB x 3-4 min)  Exercise Treatment: LAQ x 10, seated marches x 10 indep                                                     AM-PAC Score  AM-PAC Inpatient Mobility Raw Score : 13 (08/18/23 1507)  AM-PAC Inpatient T-Scale Score : 36.74 (08/18/23 1507)  Mobility Inpatient CMS 0-100% Score: 64.91 (08/18/23 1507)  Mobility Inpatient CMS G-Code Modifier : CL (08/18/23 1507)               Goals- no goals met today  Short Term Goals  Time Frame for Short Term Goals: dc  Short Term Goal 1: Supine<>sit min A with railing- MET 8/16. Revised:  Supine<>sit CGA with railing. Short Term Goal 2: Sit<>stand min A to walker  Short Term Goal 3: Ambulate 36' with RW min A  Short Term Goal 4: Demo independence with LE HEP 5-10 reps. - MET 8/17  Patient Goals   Patient Goals : wants to get stronger       Education  Patient Education  Education Given To: Patient  Education Provided: Role of Therapy;Plan of Care;Home Exercise Program  Education Provided Comments: importance of OOB, improving posture, looking up when sitting & standing, LE exercises  Education Method: Verbal;Demonstration  Barriers to Learning: None  Education Outcome: Verbalized understanding;Continued education needed      Therapy Time   Individual Concurrent Group Co-treatment   Time In 0966         Time Out 7721

## 2023-08-18 NOTE — CONSULTS
[facility]  GI Consultation                                                                 Patient: Merlin Angus  : 1950       Date:  2023    Subjective:       History of Present Illness  Patient is a 74 y.o.  male admitted with Pancytopenia ([address] [facility]) [D61.818]  Acute anemia [D64.9] who is seen in consult for 8 episodes of rectal bleeding. The pt has a PMHx of CHF, atrial flutter, GERD, relux esophagitis, CKD, pancytopenia, prostate cancer is receiving chemotherapy and radiation with his last episode of radiation therapy about 7 to 8 weeks ago. The pt presented to the hospital on  for an abnormal Hgb level of 5.9, he has had pancytopenia as well. Per primary team, the pt has had eight episodes of bright red blood per rectum since last night, with the pt's RN observing four episodes this morning. Both the pt and RN deny observing any melena or dark colored bowel movements. The pt denies any knowledge of bright red blood prior to coming to the hospital. The pt is not tachycardic, not tachypneic, and is hemodynamically stable. His labs displayed a Hgb of 7.6, he is noted to have a baseline between a Hgb of 7.0 to 8.0 last month, and BUN is 24. The pt endorses some abdominal pain, and diarrhea. He denies fever, lightheadedness, rhinorrhea, chest pain, SOB, cough, nausea, emesis, dysuria, and lower extremity swelling.     Past Medical History:   Diagnosis Date    Cancer [facility]) 2014    prostate Dr Desiree Small s/p rads with cyber knife     CHF (congestive heart failure) ([address] [facility])     Dupuytrens contracture     GERD (gastroesophageal reflux disease)     Nocturia     Reflux esophagitis       Past Surgical History:   Procedure Laterality Date    COLONOSCOPY   &     Dr Abdon Gore repeat 2018    COLONOSCOPY  2018    Dr. Matthew Nicole polyp removed0- repeat     CT NEEDLE BIOPSY LIVER PERCUTANEOUS  2023    CT NEEDLE BIOPSY LIVER PERCUTANEOUS 2023 [facility] CT SCAN last evening. His Hgb is 7.6, slightly decreased for 8.4, and is near his most recent baseline. BUN is mildly elevated to 24. Suspect pt's rectal bleeding is could be a radiation colitis due to recent treatment of his prostate. Recommendations:     -Clear liquid diet  -NPO at midnight  -Bowel prep  -Colonoscopy  -Trend Hgb    Thank you for the opportunity to participate in Viktoria Nick's care.     Mil Muhammad, DO  PGY-3

## 2023-08-18 NOTE — PROGRESS NOTES
Pt called about having a BM. Stool was bright red, abdomen was assessed with no changes and mew set of VS were obtained. Keegan Briggs MD was notified. No new orders at this time. Dr. Keegan Briggs and resident came to bedside to assess pt. 1 unit of platelets was ordered and administered. Pt resting comfortably in bed. Call light within reach. Bed alarm activated. Denies further needs at this time.

## 2023-08-18 NOTE — PROGRESS NOTES
RN notified Dr. Amara Morel in regards to pt's bp and if ok to give metoprolol and amiodarone. Ok to hold metoprolol.

## 2023-08-18 NOTE — PROGRESS NOTES
Occupational Therapy  Facility/Department: [address] [place] [address],[address]  Occupational Therapy Treatment  Name: Maria T Guzman  : 1950  MRN: [id number]  Date of Service: 2023    Discharge Recommendations:  Maria T Guzman scored a 13/24 on the AM-PAC ADL Inpatient form. Current research shows that an AM-PAC score of 17 or less is typically not associated with a discharge to the patient's home setting. Based on the patient's AM-PAC score and their current ADL deficits, it is recommended that the patient have 3-5 sessions per week of Occupational Therapy at d/c to increase the patient's independence. Please see assessment section for further patient specific details. If patient discharges prior to next session this note will serve as a discharge summary. Please see below for the latest assessment towards goals. OT Equipment Recommendations  Other: defer to d/c setting       Patient Diagnosis(es): The encounter diagnosis was Pancytopenia ([address]). Past Medical History:  has a past medical history of Cancer ([address]), CHF (congestive heart failure) ([address]), Dupuytrens contracture, GERD (gastroesophageal reflux disease), Nocturia, and Reflux esophagitis. Past Surgical History:  has a past surgical history that includes eye surgery (); Finger trigger release (Left, 2013); Prostate surgery (10/2013); Colonoscopy ( & ); Colonoscopy (2018); Endoscopy, colon, diagnostic; Tonsillectomy; CT NEEDLE BIOPSY LIVER PERCUTANEOUS (2023); and Port Surgery (Right, 2023). Treatment Diagnosis: Impaired ADLs, mobility      Assessment   Performance deficits / Impairments: Decreased functional mobility ; Decreased ADL status; Decreased endurance;Decreased strength;Decreased balance;Decreased posture;Decreased high-level IADLs  Assessment: Pt demo improved tolerance for mobility- ambulated to/from bathroom with 1 seated rest break. Pt required assist of 2 for sit<>stand transfers.  Pt demo signficiant weakness and deconditioning, HR increased to 140 with mobility. Pt requires assist for most ADLs. Recommend ongoing IP OT tx upon d/c  Treatment Diagnosis: Impaired ADLs, mobility  REQUIRES OT FOLLOW-UP: Yes  Activity Tolerance  Activity Tolerance: Patient limited by fatigue        Plan   Occupational Therapy Plan  Times Per Week: 2-5  Current Treatment Recommendations: Strengthening, Balance training, Self-Care / ADL, Functional mobility training, Endurance training, Patient/Caregiver education & training, Safety education & training     Restrictions  Position Activity Restriction  Other position/activity restrictions: up with A    Subjective   General  Chart Reviewed: Yes  Additional Pertinent Hx: Pt presented from SNF 8/9 with anemia. Pt with metatstatic prostate cancer, receives chemo. Has stage 3 pressure wound on buttocks  Family / Caregiver Present: No  Diagnosis: pancytopenia  Subjective  Subjective: Pt in bed, agreeable to OT treatment. Pain: none       Social/Functional History  Social/Functional History  Type of Home: Facility  ADL Assistance: Needs assistance  Ambulation Assistance: Needs assistance (with walker- reports approx 160' was the longest he walked in [place] with A)  Transfer Assistance: Needs assistance  Additional Comments: Was getting PT/OT at SNF. Hasn't been home in 8-10 weeks. Was living alone at that time and could walk without a device & could shower/dress. Had assist with meals.        Objective     Toilet Transfers  Equipment Used: Standard toilet  Toilet Transfer: Dependent/Total (max + min)  Toilet Transfers Comments: recommend BSC over toilet to improve transfer safety and independence (none avaible during session)       ADL  LE Dressing: Dependent/Total (change brief, Pt stood at walker with CGA during brief change)  Toileting: Dependent/Total (incontinent of BM in brief; more BM at toilet, assist for hygiene and clothing mgmt + CGA balance at walker)       Activity

## 2023-08-18 NOTE — CARE COORDINATION
Addendum at 11:44am: Received call from [address] [employer] [address] at the [facility] stating pt can come tomorrow if ready as precert remains good. Charge RN and [address] [place] Mandeep aware to send transport away if they arrive; see below    10:18am: Perfect serve sent to MD who stated pt won't discharge today and aware precert is good through tomorrow. Called [facility] whom CM Assistant arranged transport through yesterday and Alejandro Snell ([phone]) stated they do not have pt on their schedule for a 2pm pickup today. RN aware pt not leaving today and to send away transport if they come as SW unable to determine who pt was scheduled with as CM Assistant off today. RN states will update pt and Charly/POA at bedside on not discharging today. Message left for Addison at the [facility] on her secure voicemail that pt not arriving today.     Pierce ROSALES, CHRISSY-S   for [facility] ([address])  Office Phone: [phone]  [address] [place] [address] Mobile: [phone]

## 2023-08-18 NOTE — PROGRESS NOTES
(coccyx)  Nutrition Goals: PO intake 75% or greater, prior to discharge     [address] DIET; Clear Liquid  Diet NPO  PO Intake: 51-75%   PO Supplement Intake:%  Additional Sources of Calories/IVF:lactated ringers @ 50 ml/hr     COMPARATIVE STANDARDS  Energy (kcal):  6943-9633 (30-35 kcal/CBW)     Protein (g):  65-81 (1.2-1.5 gm/CBW)       Fluid (ml/day):  1 ml/kcal or per MD    ANTHROPOMETRICS  Current Height: 5' 9\" (175.3 cm)  Current Weight - Scale: 120 lb 4.8 oz (54.6 kg)    Admission weight: 120 lb 4.8 oz (54.6 kg)    The patient will be monitored per nutrition standards of care. Consult dietitian if additional nutrition interventions are needed prior to RD reassessment.      Perla Parada, [address]:  [phone]  Office:  [phone]

## 2023-08-19 ENCOUNTER — ANESTHESIA (OUTPATIENT)
Dept: ENDOSCOPY | Age: 73
End: 2023-08-19
Payer: MEDICARE

## 2023-08-19 LAB
ALBUMIN SERPL-MCNC: 2.7 G/DL (ref 3.4–5)
ANION GAP SERPL CALCULATED.3IONS-SCNC: 6 MMOL/L (ref 3–16)
ANISOCYTOSIS BLD QL SMEAR: ABNORMAL
BASOPHILS # BLD: 0 K/UL (ref 0–0.2)
BASOPHILS NFR BLD: 0 %
BUN SERPL-MCNC: 16 MG/DL (ref 7–20)
CALCIUM SERPL-MCNC: 8.4 MG/DL (ref 8.3–10.6)
CHLORIDE SERPL-SCNC: 101 MMOL/L (ref 99–110)
CO2 SERPL-SCNC: 30 MMOL/L (ref 21–32)
CREAT SERPL-MCNC: <0.5 MG/DL (ref 0.8–1.3)
DACRYOCYTES BLD QL SMEAR: ABNORMAL
DEPRECATED RDW RBC AUTO: 18.7 % (ref 12.4–15.4)
EOSINOPHIL # BLD: 0 K/UL (ref 0–0.6)
EOSINOPHIL NFR BLD: 0 %
GFR SERPLBLD CREATININE-BSD FMLA CKD-EPI: >60 ML/MIN/{1.73_M2}
GLUCOSE SERPL-MCNC: 86 MG/DL (ref 70–99)
HCT VFR BLD AUTO: 21 % (ref 40.5–52.5)
HCT VFR BLD AUTO: 24.3 % (ref 40.5–52.5)
HCT VFR BLD AUTO: 24.9 % (ref 40.5–52.5)
HGB BLD-MCNC: 7.1 G/DL (ref 13.5–17.5)
HGB BLD-MCNC: 8.3 G/DL (ref 13.5–17.5)
HGB BLD-MCNC: 8.5 G/DL (ref 13.5–17.5)
LYMPHOCYTES # BLD: 0.2 K/UL (ref 1–5.1)
LYMPHOCYTES NFR BLD: 2 %
MACROCYTES BLD QL SMEAR: ABNORMAL
MAGNESIUM SERPL-MCNC: 1.9 MG/DL (ref 1.8–2.4)
MCH RBC QN AUTO: 29.8 PG (ref 26–34)
MCHC RBC AUTO-ENTMCNC: 34.2 G/DL (ref 31–36)
MCV RBC AUTO: 87 FL (ref 80–100)
METAMYELOCYTES NFR BLD MANUAL: 1 %
MICROCYTES BLD QL SMEAR: ABNORMAL
MONOCYTES # BLD: 1 K/UL (ref 0–1.3)
MONOCYTES NFR BLD: 12 %
NEUTROPHILS # BLD: 7.4 K/UL (ref 1.7–7.7)
NEUTROPHILS NFR BLD: 85 %
PHOSPHATE SERPL-MCNC: 2.9 MG/DL (ref 2.5–4.9)
PLATELET # BLD AUTO: 37 K/UL (ref 135–450)
PLATELET BLD QL SMEAR: ABNORMAL
PMV BLD AUTO: 8.7 FL (ref 5–10.5)
POTASSIUM SERPL-SCNC: 3.9 MMOL/L (ref 3.5–5.1)
RBC # BLD AUTO: 2.8 M/UL (ref 4.2–5.9)
REASON FOR REJECTION: NORMAL
REJECTED TEST: NORMAL
SODIUM SERPL-SCNC: 137 MMOL/L (ref 136–145)
WBC # BLD AUTO: 8.6 K/UL (ref 4–11)

## 2023-08-19 PROCEDURE — 7100000010 HC PHASE II RECOVERY - FIRST 15 MIN: Performed by: INTERNAL MEDICINE

## 2023-08-19 PROCEDURE — 6370000000 HC RX 637 (ALT 250 FOR IP)

## 2023-08-19 PROCEDURE — 7100000011 HC PHASE II RECOVERY - ADDTL 15 MIN: Performed by: INTERNAL MEDICINE

## 2023-08-19 PROCEDURE — 2580000003 HC RX 258

## 2023-08-19 PROCEDURE — 3609008400 HC SIGMOIDOSCOPY DIAGNOSTIC: Performed by: INTERNAL MEDICINE

## 2023-08-19 PROCEDURE — 2060000000 HC ICU INTERMEDIATE R&B

## 2023-08-19 PROCEDURE — 3700000000 HC ANESTHESIA ATTENDED CARE: Performed by: INTERNAL MEDICINE

## 2023-08-19 PROCEDURE — 80069 RENAL FUNCTION PANEL: CPT

## 2023-08-19 PROCEDURE — 85014 HEMATOCRIT: CPT

## 2023-08-19 PROCEDURE — 2580000003 HC RX 258: Performed by: ANESTHESIOLOGY

## 2023-08-19 PROCEDURE — 99232 SBSQ HOSP IP/OBS MODERATE 35: CPT | Performed by: HOSPITALIST

## 2023-08-19 PROCEDURE — XW0H886 INTRODUCTION OF MINERAL-BASED TOPICAL HEMOSTATIC AGENT INTO LOWER GI, VIA NATURAL OR ARTIFICIAL OPENING ENDOSCOPIC, NEW TECHNOLOGY GROUP 6: ICD-10-PCS | Performed by: INTERNAL MEDICINE

## 2023-08-19 PROCEDURE — 2500000003 HC RX 250 WO HCPCS: Performed by: NURSE ANESTHETIST, CERTIFIED REGISTERED

## 2023-08-19 PROCEDURE — 3700000001 HC ADD 15 MINUTES (ANESTHESIA): Performed by: INTERNAL MEDICINE

## 2023-08-19 PROCEDURE — 6370000000 HC RX 637 (ALT 250 FOR IP): Performed by: INTERNAL MEDICINE

## 2023-08-19 PROCEDURE — C9113 INJ PANTOPRAZOLE SODIUM, VIA: HCPCS | Performed by: STUDENT IN AN ORGANIZED HEALTH CARE EDUCATION/TRAINING PROGRAM

## 2023-08-19 PROCEDURE — 6360000002 HC RX W HCPCS: Performed by: NURSE ANESTHETIST, CERTIFIED REGISTERED

## 2023-08-19 PROCEDURE — 6370000000 HC RX 637 (ALT 250 FOR IP): Performed by: NURSE PRACTITIONER

## 2023-08-19 PROCEDURE — 6360000002 HC RX W HCPCS: Performed by: STUDENT IN AN ORGANIZED HEALTH CARE EDUCATION/TRAINING PROGRAM

## 2023-08-19 PROCEDURE — 83735 ASSAY OF MAGNESIUM: CPT

## 2023-08-19 PROCEDURE — 85018 HEMOGLOBIN: CPT

## 2023-08-19 PROCEDURE — 85025 COMPLETE CBC W/AUTO DIFF WBC: CPT

## 2023-08-19 RX ORDER — LIDOCAINE HYDROCHLORIDE 20 MG/ML
INJECTION, SOLUTION INFILTRATION; PERINEURAL PRN
Status: DISCONTINUED | OUTPATIENT
Start: 2023-08-19 | End: 2023-08-19 | Stop reason: SDUPTHER

## 2023-08-19 RX ORDER — PROPOFOL 10 MG/ML
INJECTION, EMULSION INTRAVENOUS PRN
Status: DISCONTINUED | OUTPATIENT
Start: 2023-08-19 | End: 2023-08-19 | Stop reason: SDUPTHER

## 2023-08-19 RX ORDER — VANCOMYCIN HYDROCHLORIDE 125 MG/1
125 CAPSULE ORAL 4 TIMES DAILY
Status: DISCONTINUED | OUTPATIENT
Start: 2023-08-19 | End: 2023-08-24 | Stop reason: HOSPADM

## 2023-08-19 RX ORDER — SODIUM CHLORIDE 0.9 % (FLUSH) 0.9 %
5-40 SYRINGE (ML) INJECTION PRN
Status: DISCONTINUED | OUTPATIENT
Start: 2023-08-19 | End: 2023-08-19 | Stop reason: HOSPADM

## 2023-08-19 RX ORDER — LIDOCAINE HYDROCHLORIDE 10 MG/ML
1 INJECTION, SOLUTION EPIDURAL; INFILTRATION; INTRACAUDAL; PERINEURAL
Status: DISCONTINUED | OUTPATIENT
Start: 2023-08-19 | End: 2023-08-19 | Stop reason: HOSPADM

## 2023-08-19 RX ORDER — SODIUM CHLORIDE, SODIUM LACTATE, POTASSIUM CHLORIDE, CALCIUM CHLORIDE 600; 310; 30; 20 MG/100ML; MG/100ML; MG/100ML; MG/100ML
INJECTION, SOLUTION INTRAVENOUS CONTINUOUS
Status: DISCONTINUED | OUTPATIENT
Start: 2023-08-19 | End: 2023-08-21

## 2023-08-19 RX ORDER — SODIUM CHLORIDE 0.9 % (FLUSH) 0.9 %
5-40 SYRINGE (ML) INJECTION EVERY 12 HOURS SCHEDULED
Status: DISCONTINUED | OUTPATIENT
Start: 2023-08-19 | End: 2023-08-19 | Stop reason: HOSPADM

## 2023-08-19 RX ADMIN — PROPOFOL 10 MG: 10 INJECTION, EMULSION INTRAVENOUS at 08:22

## 2023-08-19 RX ADMIN — AMIODARONE HYDROCHLORIDE 200 MG: 200 TABLET ORAL at 09:27

## 2023-08-19 RX ADMIN — VANCOMYCIN HYDROCHLORIDE 125 MG: 125 CAPSULE ORAL at 17:24

## 2023-08-19 RX ADMIN — HYDROCORTISONE 2.5%: 25 CREAM TOPICAL at 20:35

## 2023-08-19 RX ADMIN — SODIUM CHLORIDE, POTASSIUM CHLORIDE, SODIUM LACTATE AND CALCIUM CHLORIDE: 600; 310; 30; 20 INJECTION, SOLUTION INTRAVENOUS at 18:14

## 2023-08-19 RX ADMIN — PANTOPRAZOLE SODIUM 40 MG: 40 INJECTION, POWDER, FOR SOLUTION INTRAVENOUS at 09:27

## 2023-08-19 RX ADMIN — PROPOFOL 20 MG: 10 INJECTION, EMULSION INTRAVENOUS at 08:19

## 2023-08-19 RX ADMIN — Medication 10 ML: at 09:40

## 2023-08-19 RX ADMIN — LATANOPROST 1 DROP: 50 SOLUTION OPHTHALMIC at 20:36

## 2023-08-19 RX ADMIN — MULTIPLE VITAMINS W/ MINERALS TAB 1 TABLET: TAB at 09:27

## 2023-08-19 RX ADMIN — HYDROCORTISONE 2.5%: 25 CREAM TOPICAL at 09:28

## 2023-08-19 RX ADMIN — SODIUM CHLORIDE, PRESERVATIVE FREE 10 ML: 5 INJECTION INTRAVENOUS at 09:28

## 2023-08-19 RX ADMIN — CALCIUM 500 MG: 500 TABLET ORAL at 09:27

## 2023-08-19 RX ADMIN — METOPROLOL SUCCINATE 50 MG: 50 TABLET, EXTENDED RELEASE ORAL at 09:26

## 2023-08-19 RX ADMIN — SODIUM CHLORIDE, POTASSIUM CHLORIDE, SODIUM LACTATE AND CALCIUM CHLORIDE: 600; 310; 30; 20 INJECTION, SOLUTION INTRAVENOUS at 09:52

## 2023-08-19 RX ADMIN — MORPHINE SULFATE 15 MG: 15 TABLET, FILM COATED, EXTENDED RELEASE ORAL at 20:35

## 2023-08-19 RX ADMIN — LIDOCAINE HYDROCHLORIDE 10 MG: 20 INJECTION, SOLUTION INFILTRATION; PERINEURAL at 08:22

## 2023-08-19 RX ADMIN — LIDOCAINE HYDROCHLORIDE 20 MG: 20 INJECTION, SOLUTION INFILTRATION; PERINEURAL at 08:19

## 2023-08-19 RX ADMIN — VANCOMYCIN HYDROCHLORIDE 125 MG: 125 CAPSULE ORAL at 14:07

## 2023-08-19 RX ADMIN — Medication 1000 UNITS: at 09:27

## 2023-08-19 RX ADMIN — PREDNISONE 20 MG: 20 TABLET ORAL at 09:27

## 2023-08-19 RX ADMIN — SODIUM CHLORIDE, PRESERVATIVE FREE 10 ML: 5 INJECTION INTRAVENOUS at 20:35

## 2023-08-19 RX ADMIN — VANCOMYCIN HYDROCHLORIDE 125 MG: 125 CAPSULE ORAL at 23:40

## 2023-08-19 RX ADMIN — VANCOMYCIN HYDROCHLORIDE 125 MG: 125 CAPSULE ORAL at 06:01

## 2023-08-19 RX ADMIN — MORPHINE SULFATE 15 MG: 15 TABLET, FILM COATED, EXTENDED RELEASE ORAL at 09:27

## 2023-08-19 ASSESSMENT — PAIN SCALES - GENERAL
PAINLEVEL_OUTOF10: 0

## 2023-08-19 ASSESSMENT — LIFESTYLE VARIABLES: SMOKING_STATUS: 0

## 2023-08-19 ASSESSMENT — PAIN SCALES - WONG BAKER
WONGBAKER_NUMERICALRESPONSE: 0

## 2023-08-19 ASSESSMENT — PAIN - FUNCTIONAL ASSESSMENT: PAIN_FUNCTIONAL_ASSESSMENT: NONE - DENIES PAIN

## 2023-08-19 NOTE — PLAN OF CARE
Problem: Pain  Goal: Verbalizes/displays adequate comfort level or baseline comfort level  8/19/2023 0546 by Sheela Corcoran RN  Outcome: Progressing  Flowsheets (Taken 8/18/2023 2000)  Verbalizes/displays adequate comfort level or baseline comfort level:   Encourage patient to monitor pain and request assistance   Assess pain using appropriate pain scale   Administer analgesics based on type and severity of pain and evaluate response   Implement non-pharmacological measures as appropriate and evaluate response    Problem: Chronic Conditions and Co-morbidities  Goal: Patient's chronic conditions and co-morbidity symptoms are monitored and maintained or improved  Outcome: Progressing  Flowsheets (Taken 8/18/2023 2000)  Care Plan - Patient's Chronic Conditions and Co-Morbidity Symptoms are Monitored and Maintained or Improved: Monitor and assess patient's chronic conditions and comorbid symptoms for stability, deterioration, or improvement     Problem: Safety - Adult  Goal: Free from fall injury  8/19/2023 0546 by Sheela Corcoran RN  Outcome: Progressing  Pt in bed with bed alarm on, instructed to call for assistance. Verbalized understanding. Problem: Skin/Tissue Integrity  Goal: Absence of new skin breakdown  Description: 1. Monitor for areas of redness and/or skin breakdown  2. Assess vascular access sites hourly  3. Every 4-6 hours minimum:  Change oxygen saturation probe site  4. Every 4-6 hours:  If on nasal continuous positive airway pressure, respiratory therapy assess nares and determine need for appliance change or resting period.   Outcome: Progressing     Problem: Metabolic/Fluid and Electrolytes - Adult  Goal: Electrolytes maintained within normal limits  Outcome: Progressing  Flowsheets (Taken 8/18/2023 2000)  Electrolytes maintained within normal limits:   Monitor labs and assess patient for signs and symptoms of electrolyte imbalances   Administer electrolyte replacement as ordered   Monitor response to electrolyte replacements, including repeat lab results as appropriate     Problem: Metabolic/Fluid and Electrolytes - Adult  Goal: Hemodynamic stability and optimal renal function maintained  Outcome: Progressing  Flowsheets (Taken 8/18/2023 2000)  Hemodynamic stability and optimal renal function maintained:   Monitor labs and assess for signs and symptoms of volume excess or deficit   Monitor intake, output and patient weight   Monitor urine specific gravity, serum osmolarity and serum sodium as indicated or ordered     Problem: Hematologic - Adult  Goal: Maintains hematologic stability  Outcome: Progressing     Problem: Respiratory - Adult  Goal: Achieves optimal ventilation and oxygenation  Outcome: Progressing     Problem: ABCDS Injury Assessment  Goal: Absence of physical injury  Outcome: Progressing

## 2023-08-19 NOTE — ANESTHESIA PRE PROCEDURE
Department of Anesthesiology  Preprocedure Note       Name:  Randi Yoder   Age:  74 y.o.  :  1950                                          MRN:  [id number]         Date:  2023      Surgeon: Rashawn Mathew):  Paige Wong MD    Procedure: Procedure(s):  COLONOSCOPY DIAGNOSTIC    Medications prior to admission:   Prior to Admission medications    Medication Sig Start Date End Date Taking? Authorizing Provider   amiodarone (CORDARONE) 200 MG tablet Take 1 tablet by mouth 2 times daily for 17 doses 23  Xiomara Browne MD   metoprolol succinate (TOPROL XL) 50 MG extended release tablet Take 1 tablet by mouth daily 23   Scar Latif MD   dexamethasone (DECADRON) 4 MG tablet Resume home dexamethasone 4 mg tablets daily after completing the 2 days of dexamethosone 40 mg daily. 23   Scar Latif MD   morphine (MS CONTIN) 15 MG extended release tablet Take 1 tablet by mouth 2 times daily. Historical Provider, MD   senna (SENOKOT) 8.6 MG tablet Take 1 tablet by mouth daily    Historical Provider, MD   ondansetron (ZOFRAN) 8 MG tablet Take 1 tablet by mouth every 8 hours as needed for Nausea or Vomiting Patient uses as first line    Historical Provider, MD   prochlorperazine (COMPAZINE) 10 MG tablet Take 1 tablet by mouth every 6 hours as needed (nausea/vomiting) Patient uses as second line    Historical Provider, MD   omeprazole (PRILOSEC) 20 MG delayed release capsule Take 1 capsule by mouth daily    Historical Provider, MD   Cholecalciferol (VITAMIN D) 50 MCG (2000) CAPS capsule Take 1 capsule by mouth daily    Historical Provider, MD   calcium carbonate (OSCAL) 500 MG TABS tablet Take 1 tablet by mouth daily    Historical Provider, MD   Multiple Vitamins-Minerals (THERAPEUTIC MULTIVITAMIN-MINERALS) tablet Take 1 tablet by mouth daily    Historical Provider, MD   HYDROcodone-acetaminophen (NORCO) 5-325 MG per tablet 1-2 tablets every 4 hours as needed (breakthrough pain).  23   Historical

## 2023-08-19 NOTE — PROCEDURES
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 difficile infection  #3 oozing from the inflamed tissue secondary to low platelets    Anesthesia:  MAC    EBL: Minimal to none. Indications: This is a 74y.o. year old male who was admitted to the hospital he has been having multiple bowel movements and they were all bloody the patient was found overnight to have C. difficile positivity we have been asked to do a colonoscopy yesterday his platelet count was 12 today is up to 37    We are going to do a evaluation and see how far in the colon we can get due to the low platelet count    I did explain this to the patient        Procedure: An informed consent was obtained from the patient after explanation of indications, benefits, possible risks and complications of the procedure. The patient was then taken to the endoscopy suite, placed in the left lateral decubitus position, and the above IV anesthesia was administered. Digital rectal examination was performed.   No masses good rectal tone but blood was coming out      Rectum fairly ulcerated with several areas of volcanic eruptions and lots of hemorrhage  The tissue was extremely friable    Sigmoid we found the same here in terms of friable tissue multiple volcanic eruptions and inflammation due to the underlying C. difficile colitis    Maneuvered my scope to 35 cm but I was creating just friction from the scope trying to get around a corner which created quite a bit of just oozing of blood    At this point in time I felt given his low platelet count and the fact that he does have the severe C. difficile colitis that we

## 2023-08-19 NOTE — PROGRESS NOTES
Patient came back from Colonoscopy. [email or web address] x 4, VSS, no c/o this time. Bed alarm on, call bell within reach.

## 2023-08-19 NOTE — ANESTHESIA POSTPROCEDURE EVALUATION
Department of Anesthesiology  Postprocedure Note    Patient: Hanna Roegrs  MRN: [id number]  YOB: 1950  Date of evaluation: 8/19/2023      Procedure Summary     Date: 08/19/23 Room / Location: [facility] [address]    Anesthesia Start: 0813 Anesthesia Stop: 1427    Procedure: SIGMOIDOSCOPY DIAGNOSTIC FLEXIBLE Diagnosis:       Hematochezia      (Hematochezia [K92.1])    Surgeons: Romi Harman MD Responsible Provider: Karin Norris DO    Anesthesia Type: MAC ASA Status: 4 - Emergent          Anesthesia Type: No value filed.     Pearl Phase I: Pearl Score: 10    Pearl Phase II: Pearl Score: 8      Anesthesia Post Evaluation    Patient location during evaluation: PACU  Patient participation: complete - patient participated  Level of consciousness: awake and alert  Pain score: 0  Airway patency: patent  Nausea & Vomiting: no nausea and no vomiting  Complications: no  Cardiovascular status: hemodynamically stable  Respiratory status: acceptable  Hydration status: stable  Pain management: adequate and satisfactory to patient

## 2023-08-20 LAB
ALBUMIN SERPL-MCNC: 2.3 G/DL (ref 3.4–5)
ANION GAP SERPL CALCULATED.3IONS-SCNC: 8 MMOL/L (ref 3–16)
ANISOCYTOSIS BLD QL SMEAR: ABNORMAL
BASOPHILS # BLD: 0 K/UL (ref 0–0.2)
BASOPHILS NFR BLD: 0 %
BUN SERPL-MCNC: 9 MG/DL (ref 7–20)
CALCIUM SERPL-MCNC: 7.6 MG/DL (ref 8.3–10.6)
CHLORIDE SERPL-SCNC: 102 MMOL/L (ref 99–110)
CO2 SERPL-SCNC: 24 MMOL/L (ref 21–32)
CREAT SERPL-MCNC: <0.5 MG/DL (ref 0.8–1.3)
DEPRECATED RDW RBC AUTO: 18.7 % (ref 12.4–15.4)
EOSINOPHIL # BLD: 0 K/UL (ref 0–0.6)
EOSINOPHIL NFR BLD: 0 %
GFR SERPLBLD CREATININE-BSD FMLA CKD-EPI: >60 ML/MIN/{1.73_M2}
GLUCOSE SERPL-MCNC: 80 MG/DL (ref 70–99)
HCT VFR BLD AUTO: 21.1 % (ref 40.5–52.5)
HCT VFR BLD AUTO: 22.6 % (ref 40.5–52.5)
HGB BLD-MCNC: 7.2 G/DL (ref 13.5–17.5)
HGB BLD-MCNC: 7.5 G/DL (ref 13.5–17.5)
LYMPHOCYTES # BLD: 0.1 K/UL (ref 1–5.1)
LYMPHOCYTES NFR BLD: 1 %
MAGNESIUM SERPL-MCNC: 1.7 MG/DL (ref 1.8–2.4)
MCH RBC QN AUTO: 29.5 PG (ref 26–34)
MCHC RBC AUTO-ENTMCNC: 33.4 G/DL (ref 31–36)
MCV RBC AUTO: 88.3 FL (ref 80–100)
METAMYELOCYTES NFR BLD MANUAL: 3 %
MICROCYTES BLD QL SMEAR: ABNORMAL
MONOCYTES # BLD: 0.4 K/UL (ref 0–1.3)
MONOCYTES NFR BLD: 7 %
MYELOCYTES NFR BLD MANUAL: 1 %
NEUTROPHILS # BLD: 5.6 K/UL (ref 1.7–7.7)
NEUTROPHILS NFR BLD: 88 %
OVALOCYTES BLD QL SMEAR: ABNORMAL
PHOSPHATE SERPL-MCNC: 2.4 MG/DL (ref 2.5–4.9)
PLATELET # BLD AUTO: 34 K/UL (ref 135–450)
PLATELET BLD QL SMEAR: ABNORMAL
PMV BLD AUTO: 8.5 FL (ref 5–10.5)
POTASSIUM SERPL-SCNC: 4.1 MMOL/L (ref 3.5–5.1)
RBC # BLD AUTO: 2.55 M/UL (ref 4.2–5.9)
SODIUM SERPL-SCNC: 134 MMOL/L (ref 136–145)
WBC # BLD AUTO: 6.1 K/UL (ref 4–11)

## 2023-08-20 PROCEDURE — 2580000003 HC RX 258: Performed by: ANESTHESIOLOGY

## 2023-08-20 PROCEDURE — 2060000000 HC ICU INTERMEDIATE R&B

## 2023-08-20 PROCEDURE — 85018 HEMOGLOBIN: CPT

## 2023-08-20 PROCEDURE — 99232 SBSQ HOSP IP/OBS MODERATE 35: CPT | Performed by: HOSPITALIST

## 2023-08-20 PROCEDURE — 85025 COMPLETE CBC W/AUTO DIFF WBC: CPT

## 2023-08-20 PROCEDURE — 2580000003 HC RX 258

## 2023-08-20 PROCEDURE — 6370000000 HC RX 637 (ALT 250 FOR IP)

## 2023-08-20 PROCEDURE — 6360000002 HC RX W HCPCS

## 2023-08-20 PROCEDURE — 6360000002 HC RX W HCPCS: Performed by: STUDENT IN AN ORGANIZED HEALTH CARE EDUCATION/TRAINING PROGRAM

## 2023-08-20 PROCEDURE — 85014 HEMATOCRIT: CPT

## 2023-08-20 PROCEDURE — 80069 RENAL FUNCTION PANEL: CPT

## 2023-08-20 PROCEDURE — 6370000000 HC RX 637 (ALT 250 FOR IP): Performed by: INTERNAL MEDICINE

## 2023-08-20 PROCEDURE — 6370000000 HC RX 637 (ALT 250 FOR IP): Performed by: NURSE PRACTITIONER

## 2023-08-20 PROCEDURE — 83735 ASSAY OF MAGNESIUM: CPT

## 2023-08-20 PROCEDURE — 6360000002 HC RX W HCPCS: Performed by: INTERNAL MEDICINE

## 2023-08-20 PROCEDURE — C9113 INJ PANTOPRAZOLE SODIUM, VIA: HCPCS | Performed by: STUDENT IN AN ORGANIZED HEALTH CARE EDUCATION/TRAINING PROGRAM

## 2023-08-20 RX ORDER — METRONIDAZOLE 500 MG/100ML
500 INJECTION, SOLUTION INTRAVENOUS EVERY 8 HOURS
Status: DISCONTINUED | OUTPATIENT
Start: 2023-08-20 | End: 2023-08-24 | Stop reason: HOSPADM

## 2023-08-20 RX ORDER — MAGNESIUM SULFATE IN WATER 40 MG/ML
2000 INJECTION, SOLUTION INTRAVENOUS ONCE
Status: COMPLETED | OUTPATIENT
Start: 2023-08-20 | End: 2023-08-20

## 2023-08-20 RX ADMIN — PANTOPRAZOLE SODIUM 40 MG: 40 INJECTION, POWDER, FOR SOLUTION INTRAVENOUS at 08:38

## 2023-08-20 RX ADMIN — SODIUM CHLORIDE, PRESERVATIVE FREE 10 ML: 5 INJECTION INTRAVENOUS at 08:38

## 2023-08-20 RX ADMIN — METOPROLOL SUCCINATE 50 MG: 50 TABLET, EXTENDED RELEASE ORAL at 08:38

## 2023-08-20 RX ADMIN — VANCOMYCIN HYDROCHLORIDE 125 MG: 125 CAPSULE ORAL at 05:36

## 2023-08-20 RX ADMIN — SODIUM CHLORIDE, POTASSIUM CHLORIDE, SODIUM LACTATE AND CALCIUM CHLORIDE: 600; 310; 30; 20 INJECTION, SOLUTION INTRAVENOUS at 09:35

## 2023-08-20 RX ADMIN — MULTIPLE VITAMINS W/ MINERALS TAB 1 TABLET: TAB at 08:38

## 2023-08-20 RX ADMIN — SODIUM CHLORIDE, POTASSIUM CHLORIDE, SODIUM LACTATE AND CALCIUM CHLORIDE: 600; 310; 30; 20 INJECTION, SOLUTION INTRAVENOUS at 18:26

## 2023-08-20 RX ADMIN — VANCOMYCIN HYDROCHLORIDE 125 MG: 125 CAPSULE ORAL at 23:31

## 2023-08-20 RX ADMIN — SODIUM CHLORIDE, PRESERVATIVE FREE 10 ML: 5 INJECTION INTRAVENOUS at 21:07

## 2023-08-20 RX ADMIN — PREDNISONE 20 MG: 20 TABLET ORAL at 08:38

## 2023-08-20 RX ADMIN — VANCOMYCIN HYDROCHLORIDE 125 MG: 125 CAPSULE ORAL at 12:37

## 2023-08-20 RX ADMIN — VANCOMYCIN HYDROCHLORIDE 125 MG: 125 CAPSULE ORAL at 18:03

## 2023-08-20 RX ADMIN — MORPHINE SULFATE 15 MG: 15 TABLET, FILM COATED, EXTENDED RELEASE ORAL at 21:06

## 2023-08-20 RX ADMIN — AMIODARONE HYDROCHLORIDE 200 MG: 200 TABLET ORAL at 08:37

## 2023-08-20 RX ADMIN — HYDROCORTISONE 2.5%: 25 CREAM TOPICAL at 08:39

## 2023-08-20 RX ADMIN — MAGNESIUM SULFATE HEPTAHYDRATE 2000 MG: 40 INJECTION, SOLUTION INTRAVENOUS at 14:06

## 2023-08-20 RX ADMIN — SODIUM CHLORIDE, POTASSIUM CHLORIDE, SODIUM LACTATE AND CALCIUM CHLORIDE: 600; 310; 30; 20 INJECTION, SOLUTION INTRAVENOUS at 02:10

## 2023-08-20 RX ADMIN — LATANOPROST 1 DROP: 50 SOLUTION OPHTHALMIC at 21:59

## 2023-08-20 RX ADMIN — METRONIDAZOLE 500 MG: 500 INJECTION, SOLUTION INTRAVENOUS at 18:08

## 2023-08-20 RX ADMIN — CALCIUM 500 MG: 500 TABLET ORAL at 08:38

## 2023-08-20 RX ADMIN — Medication 1000 UNITS: at 08:38

## 2023-08-20 RX ADMIN — METRONIDAZOLE 500 MG: 500 INJECTION, SOLUTION INTRAVENOUS at 13:20

## 2023-08-20 RX ADMIN — MORPHINE SULFATE 15 MG: 15 TABLET, FILM COATED, EXTENDED RELEASE ORAL at 08:38

## 2023-08-20 RX ADMIN — HYDROCORTISONE 2.5%: 25 CREAM TOPICAL at 21:08

## 2023-08-20 ASSESSMENT — PAIN SCALES - WONG BAKER
WONGBAKER_NUMERICALRESPONSE: 0

## 2023-08-20 ASSESSMENT — PAIN SCALES - GENERAL
PAINLEVEL_OUTOF10: 0

## 2023-08-20 ASSESSMENT — PAIN - FUNCTIONAL ASSESSMENT: PAIN_FUNCTIONAL_ASSESSMENT: ACTIVITIES ARE NOT PREVENTED

## 2023-08-20 NOTE — PLAN OF CARE
Problem: Pain  Goal: Verbalizes/displays adequate comfort level or baseline comfort level  Outcome: Progressing  Flowsheets (Taken 8/19/2023 1928)  Verbalizes/displays adequate comfort level or baseline comfort level:   Encourage patient to monitor pain and request assistance   Assess pain using appropriate pain scale   Administer analgesics based on type and severity of pain and evaluate response   Implement non-pharmacological measures as appropriate and evaluate response     Problem: Chronic Conditions and Co-morbidities  Goal: Patient's chronic conditions and co-morbidity symptoms are monitored and maintained or improved  Outcome: Progressing     Problem: Safety - Adult  Goal: Free from fall injury  Outcome: Progressing  Flowsheets (Taken 8/19/2023 1928)  Free From Fall Injury: Instruct family/caregiver on patient safety     Problem: Nutrition Deficit:  Goal: Optimize nutritional status  Outcome: Progressing     Problem: Metabolic/Fluid and Electrolytes - Adult  Goal: Electrolytes maintained within normal limits  Outcome: Progressing  Flowsheets (Taken 8/19/2023 1928)  Electrolytes maintained within normal limits:   Monitor labs and assess patient for signs and symptoms of electrolyte imbalances   Administer electrolyte replacement as ordered     Problem: Metabolic/Fluid and Electrolytes - Adult  Goal: Hemodynamic stability and optimal renal function maintained  Outcome: Progressing  Flowsheets (Taken 8/19/2023 1928)  Hemodynamic stability and optimal renal function maintained:   Monitor labs and assess for signs and symptoms of volume excess or deficit   Monitor intake, output and patient weight

## 2023-08-20 NOTE — PLAN OF CARE
Problem: Discharge Planning  Goal: Discharge to home or other facility with appropriate resources  8/20/2023 1701 by Terry Harrell RN  Outcome: Progressing  8/20/2023 1653 by Terry Harrell RN  Outcome: Progressing  8/20/2023 1653 by Terry Harrell RN  Outcome: Progressing     Problem: Pain  Goal: Verbalizes/displays adequate comfort level or baseline comfort level  8/20/2023 1701 by Terry Harrell RN  Outcome: Progressing  8/20/2023 1653 by Terry Harrell RN  Outcome: Progressing  8/20/2023 1653 by Terry Harrell RN  Outcome: Progressing  8/20/2023 0612 by Radha Mcclain RN  Outcome: Progressing  Flowsheets (Taken 8/19/2023 1928)  Verbalizes/displays adequate comfort level or baseline comfort level:   Encourage patient to monitor pain and request assistance   Assess pain using appropriate pain scale   Administer analgesics based on type and severity of pain and evaluate response   Implement non-pharmacological measures as appropriate and evaluate response     Problem: Chronic Conditions and Co-morbidities  Goal: Patient's chronic conditions and co-morbidity symptoms are monitored and maintained or improved  8/20/2023 1701 by Terry Harrell RN  Outcome: Progressing  8/20/2023 1653 by Terry Harrell RN  Outcome: Progressing  8/20/2023 1653 by Terry Harrell RN  Outcome: Progressing  8/20/2023 0612 by Radha Mcclain RN  Outcome: Progressing     Problem: Safety - Adult  Goal: Free from fall injury  8/20/2023 1701 by Terry Harrell RN  Outcome: Progressing  8/20/2023 1653 by Terry Harrell RN  Outcome: Progressing  8/20/2023 1653 by Terry Harrell RN  Outcome: Progressing  8/20/2023 0612 by Radha Mcclain RN  Outcome: Progressing  Flowsheets (Taken 8/19/2023 1928)  Free From Fall Injury: Instruct family/caregiver on patient safety     Problem: Skin/Tissue Integrity  Goal: Absence of new skin breakdown  Description: 1. Monitor for areas of redness and/or skin breakdown  2.   Assess vascular access

## 2023-08-21 ENCOUNTER — APPOINTMENT (OUTPATIENT)
Dept: CT IMAGING | Age: 73
End: 2023-08-21
Payer: MEDICARE

## 2023-08-21 ENCOUNTER — APPOINTMENT (OUTPATIENT)
Dept: GENERAL RADIOLOGY | Age: 73
End: 2023-08-21
Payer: MEDICARE

## 2023-08-21 PROBLEM — R50.9 ACUTE FEBRILE ILLNESS: Status: ACTIVE | Noted: 2023-08-21

## 2023-08-21 LAB
ABO + RH BLD: NORMAL
ALBUMIN SERPL-MCNC: 2.3 G/DL (ref 3.4–5)
ANION GAP SERPL CALCULATED.3IONS-SCNC: 8 MMOL/L (ref 3–16)
ANISOCYTOSIS BLD QL SMEAR: ABNORMAL
BASOPHILS # BLD: 0 K/UL (ref 0–0.2)
BASOPHILS NFR BLD: 0 %
BILIRUB UR QL STRIP.AUTO: NEGATIVE
BLD GP AB SCN SERPL QL: NORMAL
BLOOD BANK DISPENSE STATUS: NORMAL
BLOOD BANK PRODUCT CODE: NORMAL
BPU ID: NORMAL
BUN SERPL-MCNC: 7 MG/DL (ref 7–20)
CALCIUM SERPL-MCNC: 7.6 MG/DL (ref 8.3–10.6)
CHLORIDE SERPL-SCNC: 99 MMOL/L (ref 99–110)
CLARITY UR: CLEAR
CO2 SERPL-SCNC: 25 MMOL/L (ref 21–32)
COLOR UR: YELLOW
CREAT SERPL-MCNC: <0.5 MG/DL (ref 0.8–1.3)
DEPRECATED RDW RBC AUTO: 19.1 % (ref 12.4–15.4)
DESCRIPTION BLOOD BANK: NORMAL
EKG ATRIAL RATE: 93 BPM
EKG DIAGNOSIS: NORMAL
EKG P AXIS: 68 DEGREES
EKG P-R INTERVAL: 142 MS
EKG Q-T INTERVAL: 348 MS
EKG QRS DURATION: 82 MS
EKG QTC CALCULATION (BAZETT): 432 MS
EKG R AXIS: 96 DEGREES
EKG T AXIS: 78 DEGREES
EKG VENTRICULAR RATE: 93 BPM
EOSINOPHIL # BLD: 0 K/UL (ref 0–0.6)
EOSINOPHIL NFR BLD: 0 %
FUNGUS BLD CULT: NORMAL
FUNGUS BLD CULT: NORMAL
GFR SERPLBLD CREATININE-BSD FMLA CKD-EPI: >60 ML/MIN/{1.73_M2}
GLUCOSE SERPL-MCNC: 83 MG/DL (ref 70–99)
GLUCOSE UR STRIP.AUTO-MCNC: NEGATIVE MG/DL
HCT VFR BLD AUTO: 20 % (ref 40.5–52.5)
HCT VFR BLD AUTO: 24.6 % (ref 40.5–52.5)
HGB BLD-MCNC: 6.9 G/DL (ref 13.5–17.5)
HGB BLD-MCNC: 8.5 G/DL (ref 13.5–17.5)
HGB UR QL STRIP.AUTO: NEGATIVE
KETONES UR STRIP.AUTO-MCNC: NEGATIVE MG/DL
LEUKOCYTE ESTERASE UR QL STRIP.AUTO: NEGATIVE
LYMPHOCYTES # BLD: 0.4 K/UL (ref 1–5.1)
LYMPHOCYTES NFR BLD: 8 %
MAGNESIUM SERPL-MCNC: 1.8 MG/DL (ref 1.8–2.4)
MCH RBC QN AUTO: 30.1 PG (ref 26–34)
MCHC RBC AUTO-ENTMCNC: 34.4 G/DL (ref 31–36)
MCV RBC AUTO: 87.3 FL (ref 80–100)
MONOCYTES # BLD: 0.1 K/UL (ref 0–1.3)
MONOCYTES NFR BLD: 1 %
NEUTROPHILS # BLD: 5.1 K/UL (ref 1.7–7.7)
NEUTROPHILS NFR BLD: 90 %
NEUTS BAND NFR BLD MANUAL: 1 % (ref 0–7)
NITRITE UR QL STRIP.AUTO: NEGATIVE
NRBC BLD-RTO: 1 /100 WBC
PH UR STRIP.AUTO: 7.5 [PH] (ref 5–8)
PHOSPHATE SERPL-MCNC: 2.4 MG/DL (ref 2.5–4.9)
PLATELET # BLD AUTO: 38 K/UL (ref 135–450)
PMV BLD AUTO: 8.9 FL (ref 5–10.5)
POTASSIUM SERPL-SCNC: 3.8 MMOL/L (ref 3.5–5.1)
PROT UR STRIP.AUTO-MCNC: NEGATIVE MG/DL
RBC # BLD AUTO: 2.3 M/UL (ref 4.2–5.9)
SODIUM SERPL-SCNC: 132 MMOL/L (ref 136–145)
SP GR UR STRIP.AUTO: 1.01 (ref 1–1.03)
UA COMPLETE W REFLEX CULTURE PNL UR: NORMAL
UA DIPSTICK W REFLEX MICRO PNL UR: NORMAL
URN SPEC COLLECT METH UR: NORMAL
UROBILINOGEN UR STRIP-ACNC: 0.2 E.U./DL
WBC # BLD AUTO: 5.6 K/UL (ref 4–11)

## 2023-08-21 PROCEDURE — P9040 RBC LEUKOREDUCED IRRADIATED: HCPCS

## 2023-08-21 PROCEDURE — 6370000000 HC RX 637 (ALT 250 FOR IP): Performed by: INTERNAL MEDICINE

## 2023-08-21 PROCEDURE — 2060000000 HC ICU INTERMEDIATE R&B

## 2023-08-21 PROCEDURE — 71045 X-RAY EXAM CHEST 1 VIEW: CPT

## 2023-08-21 PROCEDURE — 86850 RBC ANTIBODY SCREEN: CPT

## 2023-08-21 PROCEDURE — 97530 THERAPEUTIC ACTIVITIES: CPT

## 2023-08-21 PROCEDURE — 6370000000 HC RX 637 (ALT 250 FOR IP)

## 2023-08-21 PROCEDURE — 93005 ELECTROCARDIOGRAM TRACING: CPT | Performed by: HOSPITALIST

## 2023-08-21 PROCEDURE — 97116 GAIT TRAINING THERAPY: CPT

## 2023-08-21 PROCEDURE — 85018 HEMOGLOBIN: CPT

## 2023-08-21 PROCEDURE — 2580000003 HC RX 258: Performed by: ANESTHESIOLOGY

## 2023-08-21 PROCEDURE — 6370000000 HC RX 637 (ALT 250 FOR IP): Performed by: NURSE PRACTITIONER

## 2023-08-21 PROCEDURE — 36430 TRANSFUSION BLD/BLD COMPNT: CPT

## 2023-08-21 PROCEDURE — 85025 COMPLETE CBC W/AUTO DIFF WBC: CPT

## 2023-08-21 PROCEDURE — 93010 ELECTROCARDIOGRAM REPORT: CPT | Performed by: INTERNAL MEDICINE

## 2023-08-21 PROCEDURE — 97110 THERAPEUTIC EXERCISES: CPT

## 2023-08-21 PROCEDURE — 86901 BLOOD TYPING SEROLOGIC RH(D): CPT

## 2023-08-21 PROCEDURE — 6360000002 HC RX W HCPCS: Performed by: INTERNAL MEDICINE

## 2023-08-21 PROCEDURE — 71250 CT THORAX DX C-: CPT

## 2023-08-21 PROCEDURE — C9113 INJ PANTOPRAZOLE SODIUM, VIA: HCPCS | Performed by: STUDENT IN AN ORGANIZED HEALTH CARE EDUCATION/TRAINING PROGRAM

## 2023-08-21 PROCEDURE — 83735 ASSAY OF MAGNESIUM: CPT

## 2023-08-21 PROCEDURE — 81003 URINALYSIS AUTO W/O SCOPE: CPT

## 2023-08-21 PROCEDURE — 85014 HEMATOCRIT: CPT

## 2023-08-21 PROCEDURE — 80069 RENAL FUNCTION PANEL: CPT

## 2023-08-21 PROCEDURE — 87040 BLOOD CULTURE FOR BACTERIA: CPT

## 2023-08-21 PROCEDURE — 97535 SELF CARE MNGMENT TRAINING: CPT

## 2023-08-21 PROCEDURE — 86900 BLOOD TYPING SEROLOGIC ABO: CPT

## 2023-08-21 PROCEDURE — 86923 COMPATIBILITY TEST ELECTRIC: CPT

## 2023-08-21 PROCEDURE — 6360000002 HC RX W HCPCS: Performed by: STUDENT IN AN ORGANIZED HEALTH CARE EDUCATION/TRAINING PROGRAM

## 2023-08-21 PROCEDURE — 99233 SBSQ HOSP IP/OBS HIGH 50: CPT | Performed by: HOSPITALIST

## 2023-08-21 PROCEDURE — 36415 COLL VENOUS BLD VENIPUNCTURE: CPT

## 2023-08-21 RX ORDER — PREDNISONE 10 MG/1
10 TABLET ORAL DAILY
Status: DISCONTINUED | OUTPATIENT
Start: 2023-08-22 | End: 2023-08-24

## 2023-08-21 RX ORDER — SODIUM CHLORIDE, SODIUM LACTATE, POTASSIUM CHLORIDE, AND CALCIUM CHLORIDE .6; .31; .03; .02 G/100ML; G/100ML; G/100ML; G/100ML
500 INJECTION, SOLUTION INTRAVENOUS ONCE
Status: CANCELLED | OUTPATIENT
Start: 2023-08-21 | End: 2023-08-21

## 2023-08-21 RX ORDER — SODIUM CHLORIDE 9 MG/ML
INJECTION, SOLUTION INTRAVENOUS PRN
Status: DISCONTINUED | OUTPATIENT
Start: 2023-08-21 | End: 2023-08-24 | Stop reason: HOSPADM

## 2023-08-21 RX ADMIN — MULTIPLE VITAMINS W/ MINERALS TAB 1 TABLET: TAB at 09:35

## 2023-08-21 RX ADMIN — VANCOMYCIN HYDROCHLORIDE 125 MG: 125 CAPSULE ORAL at 18:30

## 2023-08-21 RX ADMIN — ACETAMINOPHEN 650 MG: 325 TABLET ORAL at 09:36

## 2023-08-21 RX ADMIN — VANCOMYCIN HYDROCHLORIDE 125 MG: 125 CAPSULE ORAL at 05:23

## 2023-08-21 RX ADMIN — MORPHINE SULFATE 15 MG: 15 TABLET, FILM COATED, EXTENDED RELEASE ORAL at 21:24

## 2023-08-21 RX ADMIN — METRONIDAZOLE 500 MG: 500 INJECTION, SOLUTION INTRAVENOUS at 21:24

## 2023-08-21 RX ADMIN — PREDNISONE 20 MG: 20 TABLET ORAL at 09:35

## 2023-08-21 RX ADMIN — HYDROCORTISONE 2.5%: 25 CREAM TOPICAL at 21:28

## 2023-08-21 RX ADMIN — PANTOPRAZOLE SODIUM 40 MG: 40 INJECTION, POWDER, FOR SOLUTION INTRAVENOUS at 12:52

## 2023-08-21 RX ADMIN — METOPROLOL SUCCINATE 50 MG: 50 TABLET, EXTENDED RELEASE ORAL at 09:35

## 2023-08-21 RX ADMIN — MORPHINE SULFATE 15 MG: 15 TABLET, FILM COATED, EXTENDED RELEASE ORAL at 09:34

## 2023-08-21 RX ADMIN — LATANOPROST 1 DROP: 50 SOLUTION OPHTHALMIC at 21:26

## 2023-08-21 RX ADMIN — VANCOMYCIN HYDROCHLORIDE 125 MG: 125 CAPSULE ORAL at 11:32

## 2023-08-21 RX ADMIN — METRONIDAZOLE 500 MG: 500 INJECTION, SOLUTION INTRAVENOUS at 01:20

## 2023-08-21 RX ADMIN — SODIUM CHLORIDE, POTASSIUM CHLORIDE, SODIUM LACTATE AND CALCIUM CHLORIDE: 600; 310; 30; 20 INJECTION, SOLUTION INTRAVENOUS at 03:06

## 2023-08-21 RX ADMIN — METRONIDAZOLE 500 MG: 500 INJECTION, SOLUTION INTRAVENOUS at 12:51

## 2023-08-21 RX ADMIN — CALCIUM 500 MG: 500 TABLET ORAL at 09:35

## 2023-08-21 RX ADMIN — AMIODARONE HYDROCHLORIDE 200 MG: 200 TABLET ORAL at 09:35

## 2023-08-21 RX ADMIN — Medication 1000 UNITS: at 09:35

## 2023-08-21 ASSESSMENT — PAIN - FUNCTIONAL ASSESSMENT: PAIN_FUNCTIONAL_ASSESSMENT: ACTIVITIES ARE NOT PREVENTED

## 2023-08-21 ASSESSMENT — PAIN SCALES - GENERAL
PAINLEVEL_OUTOF10: 3
PAINLEVEL_OUTOF10: 2
PAINLEVEL_OUTOF10: 0

## 2023-08-21 ASSESSMENT — PAIN DESCRIPTION - DESCRIPTORS: DESCRIPTORS: ACHING

## 2023-08-21 ASSESSMENT — PAIN SCALES - WONG BAKER
WONGBAKER_NUMERICALRESPONSE: 0

## 2023-08-21 ASSESSMENT — PAIN DESCRIPTION - ORIENTATION: ORIENTATION: RIGHT;LEFT

## 2023-08-21 ASSESSMENT — PAIN DESCRIPTION - LOCATION: LOCATION: BUTTOCKS

## 2023-08-21 NOTE — CARE COORDINATION
Addendum at 3:53pm: Pt approved for the [facility]; authorization number is [id number]. Pt must admit to SNF within 72 hours of approval.     Addendum at 1:28pm: Received call back from Justen at the [facility]. She is aware of the below and that palliative care saw pt on the 14th as she inquired. Justen also confirmed she knew he was in c-diff isolation and reported no issues. 1:23pm: Message sent to Dr. Gaetano Denis via perfect serve to inquire when to start precert and MD states potentially ready Wednesday; aware precert will be started    Voicemail left for Addison at the [facility] on her secure voicemail that precert will be started.      CM assistant starting precert    CHRISSY Pantoja-S   for [facility] ([address])  Office Phone: [phone]  [facility] [place] - [place]: [phone]

## 2023-08-21 NOTE — PROGRESS NOTES
Rn notified MD that patient had temp 100.4 , pt given tylenol per PRN md orders for temp 100.4. Temp rechecked and it was within normal limits see flowsheet. Did MD want blood cultures or please advise. See orders.

## 2023-08-21 NOTE — PROGRESS NOTES
4 Eyes Skin Assessment     NAME:  Teri Andrea OF BIRTH:  1950  MEDICAL RECORD NUMBER:  [id number]    The patient is being assessed for  Shift Handoff    I agree that at least one RN has performed a thorough Head to Toe Skin Assessment on the patient. ALL assessment sites listed below have been assessed. Areas assessed by both nurses:    Head, Face, Ears, Shoulders, Back, Chest, Arms, Elbows, Hands, Sacrum. Buttock, Coccyx, Ischium, Legs. Feet and Heels, and Under Medical Devices         Does the Patient have a Wound? Yes wound(s) were present on assessment.  LDA wound assessment was Initiated and completed by RN       Reddy Prevention initiated by RN: Yes  Wound Care Orders initiated by RN: Yes    Pressure Injury (Stage 3,4, Unstageable, DTI, NWPT, and Complex wounds) if present, place Wound referral order by RN under : Yes    New Ostomies, if present place, Ostomy referral order under : No     Nurse 1 eSignature: Electronically signed by Danyelle Easton RN on 8/21/23 at 5:47 PM EDT    **SHARE this note so that the co-signing nurse can place an eSignature**    Nurse 2 eSignature: {Esignature:[id number]}

## 2023-08-21 NOTE — PROGRESS NOTES
Occupational Therapy  Facility/Department: [address] [place] [address],[address]  Occupational Therapy Treatment    Name: Pascual Rodriguez  : 1950  MRN: [id number]  Date of Service: 2023    Discharge Recommendations:  Pascual Rodriguez scored a 13/24 on the AM-PAC ADL Inpatient form. Current research shows that an AM-PAC score of 17 or less is typically not associated with a discharge to the patient's home setting. Based on the patient's AM-PAC score and their current ADL deficits, it is recommended that the patient have 3-5 sessions per week of Occupational Therapy at d/c to increase the patient's independence. Please see assessment section for further patient specific details. If patient discharges prior to next session this note will serve as a discharge summary. Please see below for the latest assessment towards goals. OT Equipment Recommendations  Other: defer to d/c setting       Patient Diagnosis(es): The encounter diagnosis was Pancytopenia ([address]). Past Medical History:  has a past medical history of Cancer ([address]), CHF (congestive heart failure) ([address]), Dupuytrens contracture, GERD (gastroesophageal reflux disease), Nocturia, and Reflux esophagitis. Past Surgical History:  has a past surgical history that includes eye surgery (); Finger trigger release (Left, 2013); Prostate surgery (10/2013); Colonoscopy ( & ); Colonoscopy (2018); Endoscopy, colon, diagnostic; Tonsillectomy; CT NEEDLE BIOPSY LIVER PERCUTANEOUS (2023); Port Surgery (Right, 2023); and sigmoidoscopy (N/A, 2023). Treatment Diagnosis: Impaired ADLs, mobility      Assessment   Performance deficits / Impairments: Decreased functional mobility ; Decreased ADL status; Decreased endurance;Decreased strength;Decreased balance;Decreased posture;Decreased high-level IADLs  Assessment: Pt required decreased assist for sit<>Stand transfers today.  Pt tolerated transfer from bed to chair but additional Dependent/Total (incontinent BM in brief- total assist for hygiene and clothing mgmt in stance at walker with CGA balance)  Additional Comments: Pt stood 1.5 minutes at walker with CGA balance during toileting hygiene/clothing mgmt. HR elevated to 155 with standing/transfers. Deferred additional activity          Bed mobility  Supine to Sit: Moderate assistance (HOB raised, use of rail, assist trunk)  Scooting: Stand by assistance (hips to edge of bed)    Transfers  Stand Step Transfers: Minimal assistance (bed to chair using RW)  Sit to stand:  Moderate assistance (from bed and chair)  Stand to sit: Moderate assistance (to chair x2 reps)  Transfer Comments: Poor hand placement during transfers with walker despite verbal and tactile cues    Sitting Balance: SBA (head hangs in forward, flexion- able to correct with VC)  Standing Balance: CGA at walker      Cognition  Cognition Comment: decreased short term memory, decreased insight into deficits,  anxious- needs re-education, reinforcement  Orientation  Overall Orientation Status: Within Functional Limits       Safety Devices  Type of Devices: Left in chair;Chair alarm in place;Call light within reach;Nurse notified (waffle cushion in chair)            AM-PAC Score        AM-[facility] Inpatient Daily Activity Raw Score: 13 (08/21/23 1116)  AM-PAC Inpatient ADL T-Scale Score : 32.03 (08/21/23 1116)  ADL Inpatient CMS 0-100% Score: 63.03 (08/21/23 1116)  ADL Inpatient CMS G-Code Modifier : CL (08/21/23 1116)    Tinneti Score       Goals  Short Term Goals  Time Frame for Short Term Goals: discharge  Short Term Goal 1: stand step transfer to BSC/chair with LRAD and min A- goal met 8/21; upgrade to SBA stand step transfers- not met  Short Term Goal 2: sit<>Stand transfers with min A- not met  Short Term Goal 3: tolerate standing 2 minutes during ADL/mobility- not met  Short Term Goal 4: complete 10 reps HEP for UE strengthening/endurance- ongoing       Therapy Time   Individual

## 2023-08-21 NOTE — CONSENT
Informed Consent for Blood Component Transfusion Note    I have discussed with the patient the rationale for blood component transfusion; its benefits in treating or preventing fatigue, organ damage, or death; and its risk which includes mild transfusion reactions, rare risk of blood borne infection, or more serious but rare reactions. I have discussed the alternatives to transfusion, including the risk and consequences of not receiving transfusion. The patient had an opportunity to ask questions and had agreed to proceed with transfusion of blood components.     Electronically signed by Micheal Young MD on 8/21/23 at 6:46 AM EDT

## 2023-08-21 NOTE — PROGRESS NOTES
Physical Therapy  Facility/Department: [address]  Physical Therapy Daily Treatment Note    Name: Daksha Valdovinos  : 1950  MRN: [id number]  Date of Service: 2023    Discharge Recommendations:   Daksha Valdovinos scored a  on the AM-PAC short mobility form. Current research shows that an AM-PAC score of 17 or less is typically not associated with a discharge to the patient's home setting. Based on the patient's AM-PAC score and their current functional mobility deficits, it is recommended that the patient have 3-5 sessions per week of Physical Therapy at d/c to increase the patient's independence. Please see assessment section for further patient specific details. If patient discharges prior to next session this note will serve as a discharge summary. Please see below for the latest assessment towards goals. PT Equipment Recommendations  Other: Defer      Patient Diagnosis(es): The encounter diagnosis was Pancytopenia ([address]). Past Medical History:  has a past medical history of Cancer ([address]), CHF (congestive heart failure) ([address]), Dupuytrens contracture, GERD (gastroesophageal reflux disease), Nocturia, and Reflux esophagitis. Past Surgical History:  has a past surgical history that includes eye surgery (); Finger trigger release (Left, 2013); Prostate surgery (10/2013); Colonoscopy ( & ); Colonoscopy (2018); Endoscopy, colon, diagnostic; Tonsillectomy; CT NEEDLE BIOPSY LIVER PERCUTANEOUS (2023); Port Surgery (Right, 2023); and sigmoidoscopy (N/A, 2023). Assessment   Assessment: Pt with limited treatment due to elevated HR at rest and with activity. Pt with resting HR of 140 bpm, which elevates to 155 bpm with light activity. Pt overall requiring assist of 1 this date for bed mobility, transfers and gait using wheeled walker. Pt with overall deconditioning. Pt requiring encouragement and reassurance for activity.   Rec continued

## 2023-08-21 NOTE — PLAN OF CARE
Problem: Safety - Adult  Goal: Free from fall injury  Outcome: Progressing  Flowsheets (Taken 8/19/2023 1928 by Galindo Knapp RN)  Free From Fall Injury: Instruct family/caregiver on patient safety  Note: Pt in bed with bed alarm on, instructed to call for assistance. Verbalized understanding. All fall precautions in place. Problem: Skin/Tissue Integrity  Goal: Absence of new skin breakdown  Description: 1. Monitor for areas of redness and/or skin breakdown  2. Assess vascular access sites hourly  3. Every 4-6 hours minimum:  Change oxygen saturation probe site  4. Every 4-6 hours:  If on nasal continuous positive airway pressure, respiratory therapy assess nares and determine need for appliance change or resting period. Outcome: Progressing  Note: Pt to remain free of skin breakdown during stay, pt encouraged to turn and reposition frequently. No evidence of skin breakdown noted. New mepilex applied and woulds on LDA cleansed per wound care order. Problem: Hematologic - Adult  Goal: Maintains hematologic stability  Outcome: Progressing  Note: Patient's hemoglobin this AM:   Recent Labs     08/21/23  0310   HGB 6.9*     Patient's platelet count this AM:   Recent Labs     08/21/23  0310   PLT 38*    Thrombocytopenia Precautions in place. Patient showing no signs or symptoms of active bleeding. Patient transfused blood products per orders - see flowsheet. Patient verbalizes understanding of all instructions. Will continue to assess and implement POC. Call light within reach and hourly rounding in place.

## 2023-08-22 PROBLEM — J90 PLEURAL EFFUSION ON LEFT: Status: ACTIVE | Noted: 2023-08-22

## 2023-08-22 LAB
ALBUMIN SERPL-MCNC: 2.4 G/DL (ref 3.4–5)
ANION GAP SERPL CALCULATED.3IONS-SCNC: 9 MMOL/L (ref 3–16)
BASOPHILS # BLD: 0 K/UL (ref 0–0.2)
BASOPHILS NFR BLD: 0 %
BUN SERPL-MCNC: 12 MG/DL (ref 7–20)
CALCIUM SERPL-MCNC: 7.5 MG/DL (ref 8.3–10.6)
CHLORIDE SERPL-SCNC: 99 MMOL/L (ref 99–110)
CO2 SERPL-SCNC: 23 MMOL/L (ref 21–32)
CREAT SERPL-MCNC: 0.6 MG/DL (ref 0.8–1.3)
DEPRECATED RDW RBC AUTO: 17.3 % (ref 12.4–15.4)
EOSINOPHIL # BLD: 0 K/UL (ref 0–0.6)
EOSINOPHIL NFR BLD: 0 %
GFR SERPLBLD CREATININE-BSD FMLA CKD-EPI: >60 ML/MIN/{1.73_M2}
GLUCOSE SERPL-MCNC: 103 MG/DL (ref 70–99)
HCT VFR BLD AUTO: 24.3 % (ref 40.5–52.5)
HGB BLD-MCNC: 8.6 G/DL (ref 13.5–17.5)
LYMPHOCYTES # BLD: 0 K/UL (ref 1–5.1)
LYMPHOCYTES NFR BLD: 0 %
MAGNESIUM SERPL-MCNC: 1.6 MG/DL (ref 1.8–2.4)
MCH RBC QN AUTO: 30 PG (ref 26–34)
MCHC RBC AUTO-ENTMCNC: 35.5 G/DL (ref 31–36)
MCV RBC AUTO: 84.4 FL (ref 80–100)
METAMYELOCYTES NFR BLD MANUAL: 4 %
MONOCYTES # BLD: 0.1 K/UL (ref 0–1.3)
MONOCYTES NFR BLD: 2 %
NEUTROPHILS # BLD: 7 K/UL (ref 1.7–7.7)
NEUTROPHILS NFR BLD: 80 %
NEUTS BAND NFR BLD MANUAL: 14 % (ref 0–7)
PATH INTERP BLD-IMP: NO
PHOSPHATE SERPL-MCNC: 2.1 MG/DL (ref 2.5–4.9)
PLATELET # BLD AUTO: 44 K/UL (ref 135–450)
PLATELET BLD QL SMEAR: ABNORMAL
PMV BLD AUTO: 8.8 FL (ref 5–10.5)
POTASSIUM SERPL-SCNC: 3.9 MMOL/L (ref 3.5–5.1)
RBC # BLD AUTO: 2.88 M/UL (ref 4.2–5.9)
RBC MORPH BLD: NORMAL
SLIDE REVIEW: ABNORMAL
SODIUM SERPL-SCNC: 131 MMOL/L (ref 136–145)
WBC # BLD AUTO: 7.1 K/UL (ref 4–11)

## 2023-08-22 PROCEDURE — 6370000000 HC RX 637 (ALT 250 FOR IP): Performed by: INTERNAL MEDICINE

## 2023-08-22 PROCEDURE — 6370000000 HC RX 637 (ALT 250 FOR IP)

## 2023-08-22 PROCEDURE — 83735 ASSAY OF MAGNESIUM: CPT

## 2023-08-22 PROCEDURE — C9113 INJ PANTOPRAZOLE SODIUM, VIA: HCPCS | Performed by: STUDENT IN AN ORGANIZED HEALTH CARE EDUCATION/TRAINING PROGRAM

## 2023-08-22 PROCEDURE — 2060000000 HC ICU INTERMEDIATE R&B

## 2023-08-22 PROCEDURE — 6360000002 HC RX W HCPCS: Performed by: STUDENT IN AN ORGANIZED HEALTH CARE EDUCATION/TRAINING PROGRAM

## 2023-08-22 PROCEDURE — 80069 RENAL FUNCTION PANEL: CPT

## 2023-08-22 PROCEDURE — 6370000000 HC RX 637 (ALT 250 FOR IP): Performed by: NURSE PRACTITIONER

## 2023-08-22 PROCEDURE — 6360000002 HC RX W HCPCS

## 2023-08-22 PROCEDURE — 6360000002 HC RX W HCPCS: Performed by: INTERNAL MEDICINE

## 2023-08-22 PROCEDURE — 97110 THERAPEUTIC EXERCISES: CPT

## 2023-08-22 PROCEDURE — 99232 SBSQ HOSP IP/OBS MODERATE 35: CPT | Performed by: HOSPITALIST

## 2023-08-22 PROCEDURE — 2580000003 HC RX 258

## 2023-08-22 PROCEDURE — 85025 COMPLETE CBC W/AUTO DIFF WBC: CPT

## 2023-08-22 PROCEDURE — 36591 DRAW BLOOD OFF VENOUS DEVICE: CPT

## 2023-08-22 RX ORDER — FUROSEMIDE 10 MG/ML
20 INJECTION INTRAMUSCULAR; INTRAVENOUS 2 TIMES DAILY
Status: DISCONTINUED | OUTPATIENT
Start: 2023-08-22 | End: 2023-08-24 | Stop reason: HOSPADM

## 2023-08-22 RX ORDER — POTASSIUM CHLORIDE 20 MEQ/1
20 TABLET, EXTENDED RELEASE ORAL ONCE
Status: COMPLETED | OUTPATIENT
Start: 2023-08-22 | End: 2023-08-22

## 2023-08-22 RX ORDER — MAGNESIUM SULFATE IN WATER 40 MG/ML
2000 INJECTION, SOLUTION INTRAVENOUS ONCE
Status: COMPLETED | OUTPATIENT
Start: 2023-08-22 | End: 2023-08-22

## 2023-08-22 RX ADMIN — METRONIDAZOLE 500 MG: 500 INJECTION, SOLUTION INTRAVENOUS at 04:56

## 2023-08-22 RX ADMIN — SODIUM CHLORIDE, PRESERVATIVE FREE 10 ML: 5 INJECTION INTRAVENOUS at 09:14

## 2023-08-22 RX ADMIN — METRONIDAZOLE 500 MG: 500 INJECTION, SOLUTION INTRAVENOUS at 13:31

## 2023-08-22 RX ADMIN — METOPROLOL SUCCINATE 50 MG: 50 TABLET, EXTENDED RELEASE ORAL at 09:10

## 2023-08-22 RX ADMIN — VANCOMYCIN HYDROCHLORIDE 125 MG: 125 CAPSULE ORAL at 06:22

## 2023-08-22 RX ADMIN — METRONIDAZOLE 500 MG: 500 INJECTION, SOLUTION INTRAVENOUS at 20:23

## 2023-08-22 RX ADMIN — LATANOPROST 1 DROP: 50 SOLUTION OPHTHALMIC at 20:10

## 2023-08-22 RX ADMIN — MORPHINE SULFATE 15 MG: 15 TABLET, FILM COATED, EXTENDED RELEASE ORAL at 09:12

## 2023-08-22 RX ADMIN — VANCOMYCIN HYDROCHLORIDE 125 MG: 125 CAPSULE ORAL at 17:59

## 2023-08-22 RX ADMIN — HYDROCORTISONE 2.5%: 25 CREAM TOPICAL at 20:10

## 2023-08-22 RX ADMIN — MULTIPLE VITAMINS W/ MINERALS TAB 1 TABLET: TAB at 09:11

## 2023-08-22 RX ADMIN — AMIODARONE HYDROCHLORIDE 200 MG: 200 TABLET ORAL at 09:11

## 2023-08-22 RX ADMIN — PREDNISONE 10 MG: 10 TABLET ORAL at 09:11

## 2023-08-22 RX ADMIN — VANCOMYCIN HYDROCHLORIDE 125 MG: 125 CAPSULE ORAL at 12:26

## 2023-08-22 RX ADMIN — MORPHINE SULFATE 15 MG: 15 TABLET, FILM COATED, EXTENDED RELEASE ORAL at 20:08

## 2023-08-22 RX ADMIN — FUROSEMIDE 20 MG: 10 INJECTION, SOLUTION INTRAMUSCULAR; INTRAVENOUS at 14:21

## 2023-08-22 RX ADMIN — VANCOMYCIN HYDROCHLORIDE 125 MG: 125 CAPSULE ORAL at 00:28

## 2023-08-22 RX ADMIN — CALCIUM 500 MG: 500 TABLET ORAL at 09:11

## 2023-08-22 RX ADMIN — Medication 1000 UNITS: at 09:11

## 2023-08-22 RX ADMIN — PANTOPRAZOLE SODIUM 40 MG: 40 INJECTION, POWDER, FOR SOLUTION INTRAVENOUS at 09:22

## 2023-08-22 RX ADMIN — POTASSIUM CHLORIDE 20 MEQ: 1500 TABLET, EXTENDED RELEASE ORAL at 12:19

## 2023-08-22 RX ADMIN — MAGNESIUM SULFATE HEPTAHYDRATE 2000 MG: 40 INJECTION, SOLUTION INTRAVENOUS at 07:45

## 2023-08-22 ASSESSMENT — PAIN DESCRIPTION - ONSET: ONSET: ON-GOING

## 2023-08-22 ASSESSMENT — PAIN DESCRIPTION - FREQUENCY: FREQUENCY: CONTINUOUS

## 2023-08-22 ASSESSMENT — PAIN DESCRIPTION - DESCRIPTORS: DESCRIPTORS: ACHING

## 2023-08-22 ASSESSMENT — PAIN SCALES - WONG BAKER
WONGBAKER_NUMERICALRESPONSE: 0

## 2023-08-22 ASSESSMENT — PAIN SCALES - GENERAL
PAINLEVEL_OUTOF10: 3
PAINLEVEL_OUTOF10: 4

## 2023-08-22 ASSESSMENT — PAIN DESCRIPTION - PAIN TYPE: TYPE: ACUTE PAIN

## 2023-08-22 ASSESSMENT — PAIN DESCRIPTION - LOCATION: LOCATION: BUTTOCKS

## 2023-08-22 ASSESSMENT — PAIN - FUNCTIONAL ASSESSMENT: PAIN_FUNCTIONAL_ASSESSMENT: ACTIVITIES ARE NOT PREVENTED

## 2023-08-22 ASSESSMENT — PAIN DESCRIPTION - ORIENTATION: ORIENTATION: RIGHT;LEFT

## 2023-08-22 NOTE — PROGRESS NOTES
or  represent a component of interstitial edema. 4. Interval resolution of airspace consolidation in the left lower lobe. 5.  Indeterminant new 5 mm pulmonary nodule in the left upper lobe. This may  represent pulm metastasis, and recommend attention on follow-up. 6.  Widespread osseous metastatic disease without significant change. 7.  Limited evaluation of the upper abdomen with redemonstration of hepatic  metastasis. Will try Lasix 20 mg IVP BID; will proceed with L sided thoracentesis if no improvement within next 24-48 hours.     Lynna Gottron, MD, FACP

## 2023-08-22 NOTE — PROGRESS NOTES
RN notified MD Marcello Felder in regards to low bp see flowsheet and ok to hold lasix. Ok to hold lasix. RN placed bp on manual cycle every 30 mins to monitor patientsblood pressure. Pt denies any dizziness or light headedness.

## 2023-08-22 NOTE — PROGRESS NOTES
Assessment  75 yo male with history of metastatic prostate cancer, CHD, AF, GERD, CKD evaluated for pancytopenia and rectal bleeding. Colonoscopy performed 8/19/23 with Dr. Chio Guillory with signs of colitis felt to relate to C difficile, rather than radiation, and evidence of significant friability with contact bleeding. Procedure aborted at 35cm. Patient treated with oral vancomycin and IV Flagyl was added following colonoscopy. AXR with no evidence of megacolon. Reports only 1 bowel movement today. Required 1 unit pRBC this morning with good response. Plan:  Continue oral vancomycin 125mg po QID for 10 day course   Ongoing support of cytopenias per Oncology, input appreciated. If recurrent rectal bleeding is noted, transfuse platelets to above 50. IV Flagyl as recommended by Dr. hCio Guillory given degree of colitis. Can likely discontinue at discharge to complete treatment with oral vancomycin alone. Serial H/H   Will sign off at this time. Please call with any additional questions or concerns. Subjective: We are following for rectal bleeding, C difficile infection. Patient offers no digestive complaints at this time. Denies nausea, vomiting, diarrhea. States he had a bowel movement this morning and does not believe this contained blood. Objective:    Review of Systems:    Constitutional: Negative for fever, chills, and unexpected weight change. HENT: Negative for trouble swallowing. Respiratory: Negative for cough, chest tightness and shortness of breath. Cardiovascular: Negative for chest pain  Gastrointestinal: see HPI  Musculoskeletal: Negative for unusual arthralgias. Skin: Negative for rash.      Scheduled Meds:   [START ON 8/22/2023] predniSONE  10 mg Oral Daily    metroNIDAZOLE  500 mg IntraVENous Q8H    vancomycin  125 mg Oral 4x Daily    pantoprazole  40 mg IntraVENous Daily    amiodarone  200 mg Oral Daily    hydrocortisone   Rectal BID    morphine  15 mg Oral BID

## 2023-08-22 NOTE — PLAN OF CARE
Problem: Respiratory - Adult  Goal: Achieves optimal ventilation and oxygenation  Outcome: Not Progressing  Note: Pt maintaining oxygen saturation levels above 90% on 3.5 liters/min. Was unable to titrate pt's oxygen therapy down this shift. Problem: Pain  Goal: Verbalizes/displays adequate comfort level or baseline comfort level  Outcome: Progressing  Note: Pt did not report pain this shift. No grimacing, wincing, tearfulness observed to indicate pain. Pt educated on importance of calling for pain meds when in pain. Pt verbalized understanding. Problem: Safety - Adult  Goal: Free from fall injury  Outcome: Progressing  Orthostatic vital signs obtained at start of shift - see flowsheet for details. Pt meets criteria for orthostasis. Pt is a High fall risk. See Cloretta Bevel Fall Score and ABCDS Injury Risk assessments.   + Screening for Orthostasis and/or + High Fall Risk per FAJARDO/ABCDS: Explained fall risk precautions to pt and family and rationale behind their use to keep the patient safe. Pt bed is in low position, side rails up, call light and belongings are in reach. Fall wristband applied and present on pts wrist.  Bed alarm on. Pt encouraged to call for assistance. Will continue with hourly rounds for PO intake, pain needs, toileting and repositioning as needed. Problem: Skin/Tissue Integrity  Goal: Absence of new skin breakdown  Description: 1. Monitor for areas of redness and/or skin breakdown  2. Assess vascular access sites hourly  3. Every 4-6 hours minimum:  Change oxygen saturation probe site  4. Every 4-6 hours:  If on nasal continuous positive airway pressure, respiratory therapy assess nares and determine need for appliance change or resting period. 8/22/2023 1611 by Bon Vallejo RN  Outcome: Progressing  Note: No new wounds present. Pt being repositioned frequently to allow relief from pressure. Specialty bed in use.      Problem: Nutrition Deficit:  Goal: Optimize nutritional status  8/22/2023 1611 by Prosper Mcnulty RN  Outcome: Progressing  Note: Pt eating 75% of meals. Appetite appears intact. Problem: Gastrointestinal - Adult  Goal: Maintains or returns to baseline bowel function  8/22/2023 1611 by Prosper Mcnulty RN  Outcome: Progressing  Note: No episodes of diarrhea this shift. Problem: Hematologic - Adult  Goal: Maintains hematologic stability  8/22/2023 1611 by Prosper Mcnulty RN  Outcome: Progressing  Note: Patient's hemoglobin this AM:   Recent Labs     08/22/23  0340   HGB 8.6*     Patient's platelet count this AM:   Recent Labs     08/22/23  0340   PLT 44*    Thrombocytopenia Precautions in place. Patient showing no signs or symptoms of active bleeding. Transfusion not indicated at this time. Patient verbalizes understanding of all instructions. Will continue to assess and implement POC. Call light within reach and hourly rounding in place. Problem: ABCDS Injury Assessment  Goal: Absence of physical injury  Outcome: Progressing   Note: No new physical injuries observed or reported by pt.

## 2023-08-22 NOTE — PROGRESS NOTES
Was called to come to the bedside to speak to the POA regarding conversation that was had with the patient this morning. Again reviewed diagnosis, palliative treatment intent, prognosis with and without treatment.  Reviewed that his performance status needs to improve before further treatment can be considered and additionally his platelets have not recovered enough to undergo treatment at this time     Again asked them to consider goals of care and shared my concerns that we are causing more harm than benefit by giving him chemotherapy       Viviane Noriega, LONNIEP-BC  Medical Oncology

## 2023-08-22 NOTE — PROGRESS NOTES
Occupational Therapy  Facility/Department: [address]  Occupational Therapy Progress Note    Name: Neri Beckman  : 1950  MRN: [id number]  Date of Service: 2023    Discharge Recommendations:  Neri Beckman scored a 13/24 on the AM-PAC ADL Inpatient form. Current research shows that an AM-PAC score of 17 or less is typically not associated with a discharge to the patient's home setting. Based on the patient's AM-PAC score and their current ADL deficits, it is recommended that the patient have 3-5 sessions per week of Occupational Therapy at d/c to increase the patient's independence. Please see assessment section for further patient specific details. If patient discharges prior to next session this note will serve as a discharge summary. Please see below for the latest assessment towards goals. OT Equipment Recommendations  Other: defer to d/c setting       Patient Diagnosis(es): The encounter diagnosis was Pancytopenia ([address]). Past Medical History:  has a past medical history of Cancer ([address]), CHF (congestive heart failure) ([address]), Dupuytrens contracture, GERD (gastroesophageal reflux disease), Nocturia, and Reflux esophagitis. Past Surgical History:  has a past surgical history that includes eye surgery (); Finger trigger release (Left, 2013); Prostate surgery (10/2013); Colonoscopy ( & ); Colonoscopy (2018); Endoscopy, colon, diagnostic; Tonsillectomy; CT NEEDLE BIOPSY LIVER PERCUTANEOUS (2023); Port Surgery (Right, 2023); and sigmoidoscopy (N/A, 2023). Treatment Diagnosis: Impaired ADLs, mobility      Assessment   Performance deficits / Impairments: Decreased functional mobility ; Decreased ADL status; Decreased endurance;Decreased strength;Decreased balance;Decreased posture;Decreased high-level IADLs  Assessment: Pt tolerated UE exerc and hygiene tasks in bed after set up.   He is on 4 liters O2 and desats to 88% after doing UE

## 2023-08-22 NOTE — CARE COORDINATION
10:01am: Spoke with pt at bedside with RN present. Pt aware that precert is approved through Thursday for the [facility] and pt confirmed this is the plan. He was given his IMM.      SW will follow    CHRISSY Arora-S   for [facility] ([address])  Office Phone: [phone] [address]: [phone]

## 2023-08-23 ENCOUNTER — APPOINTMENT (OUTPATIENT)
Dept: GENERAL RADIOLOGY | Age: 73
End: 2023-08-23
Payer: MEDICARE

## 2023-08-23 ENCOUNTER — APPOINTMENT (OUTPATIENT)
Dept: CT IMAGING | Age: 73
End: 2023-08-23
Payer: MEDICARE

## 2023-08-23 ENCOUNTER — APPOINTMENT (OUTPATIENT)
Dept: ULTRASOUND IMAGING | Age: 73
End: 2023-08-23
Payer: MEDICARE

## 2023-08-23 PROBLEM — K92.2 ACUTE LOWER GI BLEEDING: Status: ACTIVE | Noted: 2023-01-01

## 2023-08-23 LAB
ALBUMIN FLD-MCNC: 1.4 G/DL
ALBUMIN SERPL-MCNC: 2.3 G/DL (ref 3.4–5)
ANION GAP SERPL CALCULATED.3IONS-SCNC: 7 MMOL/L (ref 3–16)
ANISOCYTOSIS BLD QL SMEAR: ABNORMAL
APPEARANCE FLUID: NORMAL
BASE EXCESS BLDV CALC-SCNC: 1 MMOL/L (ref -3–3)
BASOPHILS # BLD: 0 K/UL (ref 0–0.2)
BASOPHILS NFR BLD: 0 %
BDY FLUID QUALITY: NORMAL
BLOOD BANK DISPENSE STATUS: NORMAL
BLOOD BANK PRODUCT CODE: NORMAL
BPU ID: NORMAL
BUN SERPL-MCNC: 11 MG/DL (ref 7–20)
CA-I BLD-SCNC: 1.07 MMOL/L (ref 1.12–1.32)
CALCIUM SERPL-MCNC: 7.9 MG/DL (ref 8.3–10.6)
CELL COUNT FLUID TYPE: NORMAL
CHLORIDE SERPL-SCNC: 97 MMOL/L (ref 99–110)
CO2 BLDV-SCNC: 26 MMOL/L
CO2 SERPL-SCNC: 28 MMOL/L (ref 21–32)
COLOR FLUID: NORMAL
CREAT SERPL-MCNC: 0.6 MG/DL (ref 0.8–1.3)
DEPRECATED RDW RBC AUTO: 17.3 % (ref 12.4–15.4)
DESCRIPTION BLOOD BANK: NORMAL
EOSINOPHIL # BLD: 0 K/UL (ref 0–0.6)
EOSINOPHIL NFR BLD: 0 %
GFR SERPLBLD CREATININE-BSD FMLA CKD-EPI: >60 ML/MIN/{1.73_M2}
GLUCOSE BLD-MCNC: 118 MG/DL (ref 70–99)
GLUCOSE BLD-MCNC: 118 MG/DL (ref 70–99)
GLUCOSE SERPL-MCNC: 87 MG/DL (ref 70–99)
GRAM STN SPEC: NORMAL
HCO3 BLDV-SCNC: 24.4 MMOL/L (ref 23–29)
HCT VFR BLD AUTO: 24.4 % (ref 40.5–52.5)
HCT VFR BLD AUTO: 24.5 % (ref 40.5–52.5)
HCT VFR BLD AUTO: 28.3 % (ref 40.5–52.5)
HGB BLD-MCNC: 8.3 G/DL (ref 13.5–17.5)
HGB BLD-MCNC: 8.4 G/DL (ref 13.5–17.5)
HGB BLD-MCNC: 9.9 G/DL (ref 13.5–17.5)
LACTATE BLD-SCNC: 1.83 MMOL/L (ref 0.4–2)
LACTATE BLDV-SCNC: 1.4 MMOL/L (ref 0.4–2)
LDH FLD L TO P-CCNC: 172 U/L
LYMPHOCYTES # BLD: 0.1 K/UL (ref 1–5.1)
LYMPHOCYTES NFR BLD: 1 %
LYMPHOCYTES NFR FLD: 25 %
MACROPHAGES # FLD: 1 %
MAGNESIUM SERPL-MCNC: 1.6 MG/DL (ref 1.8–2.4)
MCH RBC QN AUTO: 29.8 PG (ref 26–34)
MCHC RBC AUTO-ENTMCNC: 34.6 G/DL (ref 31–36)
MCV RBC AUTO: 86.2 FL (ref 80–100)
MESOTHL CELL NFR FLD: 5 %
METAMYELOCYTES NFR BLD MANUAL: 3 %
MONOCYTES # BLD: 0.3 K/UL (ref 0–1.3)
MONOCYTES NFR BLD: 4 %
MONOCYTES NFR FLD: 12 %
MYELOCYTES NFR BLD MANUAL: 1 %
NEUTROPHIL, FLUID: 57 %
NEUTROPHILS # BLD: 7.7 K/UL (ref 1.7–7.7)
NEUTROPHILS NFR BLD: 91 %
NUC CELL # FLD: 142 /CUMM
PCO2 BLDV: 35 MM HG (ref 40–50)
PERFORMED ON: ABNORMAL
PERFORMED ON: ABNORMAL
PH BLDV: 7.45 [PH] (ref 7.35–7.45)
PHOSPHATE SERPL-MCNC: 2 MG/DL (ref 2.5–4.9)
PLATELET # BLD AUTO: 57 K/UL (ref 135–450)
PLATELET BLD QL SMEAR: ABNORMAL
PMV BLD AUTO: 9.8 FL (ref 5–10.5)
PO2 BLDV: 48 MM HG
POC SAMPLE TYPE: ABNORMAL
POTASSIUM BLD-SCNC: 3.8 MMOL/L (ref 3.5–5.1)
POTASSIUM SERPL-SCNC: 4 MMOL/L (ref 3.5–5.1)
PROT FLD-MCNC: 2.2 G/DL
PROT SERPL-MCNC: 4.8 G/DL (ref 6.4–8.2)
PSA SERPL DL<=0.01 NG/ML-MCNC: 0.02 NG/ML (ref 0–4)
RBC # BLD AUTO: 2.83 M/UL (ref 4.2–5.9)
RBC FLUID: NORMAL /CUMM
SAO2 % BLDV: 86 %
SODIUM BLD-SCNC: 129 MMOL/L (ref 136–145)
SODIUM SERPL-SCNC: 132 MMOL/L (ref 136–145)
TOTAL CELLS COUNTED FLD: 100
WBC # BLD AUTO: 8.1 K/UL (ref 4–11)

## 2023-08-23 PROCEDURE — 82947 ASSAY GLUCOSE BLOOD QUANT: CPT

## 2023-08-23 PROCEDURE — 76376 3D RENDER W/INTRP POSTPROCES: CPT

## 2023-08-23 PROCEDURE — 88112 CYTOPATH CELL ENHANCE TECH: CPT

## 2023-08-23 PROCEDURE — 87205 SMEAR GRAM STAIN: CPT

## 2023-08-23 PROCEDURE — 84157 ASSAY OF PROTEIN OTHER: CPT

## 2023-08-23 PROCEDURE — 84132 ASSAY OF SERUM POTASSIUM: CPT

## 2023-08-23 PROCEDURE — 83986 ASSAY PH BODY FLUID NOS: CPT

## 2023-08-23 PROCEDURE — 87206 SMEAR FLUORESCENT/ACID STAI: CPT

## 2023-08-23 PROCEDURE — 80069 RENAL FUNCTION PANEL: CPT

## 2023-08-23 PROCEDURE — 84295 ASSAY OF SERUM SODIUM: CPT

## 2023-08-23 PROCEDURE — 85025 COMPLETE CBC W/AUTO DIFF WBC: CPT

## 2023-08-23 PROCEDURE — 94761 N-INVAS EAR/PLS OXIMETRY MLT: CPT

## 2023-08-23 PROCEDURE — 87102 FUNGUS ISOLATION CULTURE: CPT

## 2023-08-23 PROCEDURE — 2700000000 HC OXYGEN THERAPY PER DAY

## 2023-08-23 PROCEDURE — 88305 TISSUE EXAM BY PATHOLOGIST: CPT

## 2023-08-23 PROCEDURE — 82803 BLOOD GASES ANY COMBINATION: CPT

## 2023-08-23 PROCEDURE — 36591 DRAW BLOOD OFF VENOUS DEVICE: CPT

## 2023-08-23 PROCEDURE — 82945 GLUCOSE OTHER FLUID: CPT

## 2023-08-23 PROCEDURE — 82042 OTHER SOURCE ALBUMIN QUAN EA: CPT

## 2023-08-23 PROCEDURE — 36430 TRANSFUSION BLD/BLD COMPNT: CPT

## 2023-08-23 PROCEDURE — 6360000002 HC RX W HCPCS: Performed by: STUDENT IN AN ORGANIZED HEALTH CARE EDUCATION/TRAINING PROGRAM

## 2023-08-23 PROCEDURE — 6360000002 HC RX W HCPCS

## 2023-08-23 PROCEDURE — 93005 ELECTROCARDIOGRAM TRACING: CPT

## 2023-08-23 PROCEDURE — 6370000000 HC RX 637 (ALT 250 FOR IP)

## 2023-08-23 PROCEDURE — 85014 HEMATOCRIT: CPT

## 2023-08-23 PROCEDURE — 6370000000 HC RX 637 (ALT 250 FOR IP): Performed by: NURSE PRACTITIONER

## 2023-08-23 PROCEDURE — 2060000000 HC ICU INTERMEDIATE R&B

## 2023-08-23 PROCEDURE — 2580000003 HC RX 258

## 2023-08-23 PROCEDURE — 0W9B3ZZ DRAINAGE OF LEFT PLEURAL CAVITY, PERCUTANEOUS APPROACH: ICD-10-PCS | Performed by: RADIOLOGY

## 2023-08-23 PROCEDURE — 82330 ASSAY OF CALCIUM: CPT

## 2023-08-23 PROCEDURE — 83615 LACTATE (LD) (LDH) ENZYME: CPT

## 2023-08-23 PROCEDURE — 6360000002 HC RX W HCPCS: Performed by: HOSPITALIST

## 2023-08-23 PROCEDURE — 2500000003 HC RX 250 WO HCPCS: Performed by: RADIOLOGY

## 2023-08-23 PROCEDURE — 84155 ASSAY OF PROTEIN SERUM: CPT

## 2023-08-23 PROCEDURE — 83605 ASSAY OF LACTIC ACID: CPT

## 2023-08-23 PROCEDURE — 87116 MYCOBACTERIA CULTURE: CPT

## 2023-08-23 PROCEDURE — 83735 ASSAY OF MAGNESIUM: CPT

## 2023-08-23 PROCEDURE — 99233 SBSQ HOSP IP/OBS HIGH 50: CPT | Performed by: HOSPITALIST

## 2023-08-23 PROCEDURE — 71045 X-RAY EXAM CHEST 1 VIEW: CPT

## 2023-08-23 PROCEDURE — 84153 ASSAY OF PSA TOTAL: CPT

## 2023-08-23 PROCEDURE — 6360000002 HC RX W HCPCS: Performed by: INTERNAL MEDICINE

## 2023-08-23 PROCEDURE — C9113 INJ PANTOPRAZOLE SODIUM, VIA: HCPCS | Performed by: STUDENT IN AN ORGANIZED HEALTH CARE EDUCATION/TRAINING PROGRAM

## 2023-08-23 PROCEDURE — 85018 HEMOGLOBIN: CPT

## 2023-08-23 PROCEDURE — 6370000000 HC RX 637 (ALT 250 FOR IP): Performed by: INTERNAL MEDICINE

## 2023-08-23 PROCEDURE — 89051 BODY FLUID CELL COUNT: CPT

## 2023-08-23 RX ORDER — POTASSIUM CHLORIDE 7.45 MG/ML
10 INJECTION INTRAVENOUS
Status: DISPENSED | OUTPATIENT
Start: 2023-08-23 | End: 2023-08-23

## 2023-08-23 RX ORDER — ALBUMIN (HUMAN) 12.5 G/50ML
25 SOLUTION INTRAVENOUS ONCE
Status: DISCONTINUED | OUTPATIENT
Start: 2023-08-23 | End: 2023-08-24 | Stop reason: HOSPADM

## 2023-08-23 RX ORDER — LIDOCAINE HYDROCHLORIDE 10 MG/ML
INJECTION, SOLUTION EPIDURAL; INFILTRATION; INTRACAUDAL; PERINEURAL PRN
Status: COMPLETED | OUTPATIENT
Start: 2023-08-23 | End: 2023-08-23

## 2023-08-23 RX ORDER — SODIUM CHLORIDE, SODIUM LACTATE, POTASSIUM CHLORIDE, CALCIUM CHLORIDE 600; 310; 30; 20 MG/100ML; MG/100ML; MG/100ML; MG/100ML
INJECTION, SOLUTION INTRAVENOUS CONTINUOUS
Status: ACTIVE | OUTPATIENT
Start: 2023-08-23 | End: 2023-08-24

## 2023-08-23 RX ORDER — MAGNESIUM SULFATE IN WATER 40 MG/ML
2000 INJECTION, SOLUTION INTRAVENOUS ONCE
Status: COMPLETED | OUTPATIENT
Start: 2023-08-23 | End: 2023-08-23

## 2023-08-23 RX ORDER — LIDOCAINE HYDROCHLORIDE 10 MG/ML
5 INJECTION, SOLUTION EPIDURAL; INFILTRATION; INTRACAUDAL; PERINEURAL ONCE
Status: DISCONTINUED | OUTPATIENT
Start: 2023-08-23 | End: 2023-08-24 | Stop reason: HOSPADM

## 2023-08-23 RX ORDER — SODIUM CHLORIDE, SODIUM LACTATE, POTASSIUM CHLORIDE, AND CALCIUM CHLORIDE .6; .31; .03; .02 G/100ML; G/100ML; G/100ML; G/100ML
1000 INJECTION, SOLUTION INTRAVENOUS ONCE
Status: DISCONTINUED | OUTPATIENT
Start: 2023-08-23 | End: 2023-08-24 | Stop reason: HOSPADM

## 2023-08-23 RX ORDER — SODIUM CHLORIDE 9 MG/ML
INJECTION, SOLUTION INTRAVENOUS PRN
Status: DISCONTINUED | OUTPATIENT
Start: 2023-08-23 | End: 2023-08-24 | Stop reason: HOSPADM

## 2023-08-23 RX ORDER — MAGNESIUM SULFATE IN WATER 40 MG/ML
2000 INJECTION, SOLUTION INTRAVENOUS ONCE
Status: DISCONTINUED | OUTPATIENT
Start: 2023-08-23 | End: 2023-08-23

## 2023-08-23 RX ADMIN — AMIODARONE HYDROCHLORIDE 200 MG: 200 TABLET ORAL at 09:26

## 2023-08-23 RX ADMIN — MORPHINE SULFATE 15 MG: 15 TABLET, FILM COATED, EXTENDED RELEASE ORAL at 09:26

## 2023-08-23 RX ADMIN — MORPHINE SULFATE 15 MG: 15 TABLET, FILM COATED, EXTENDED RELEASE ORAL at 20:57

## 2023-08-23 RX ADMIN — VANCOMYCIN HYDROCHLORIDE 125 MG: 125 CAPSULE ORAL at 00:31

## 2023-08-23 RX ADMIN — VANCOMYCIN HYDROCHLORIDE 125 MG: 125 CAPSULE ORAL at 05:36

## 2023-08-23 RX ADMIN — HYDROCORTISONE 2.5%: 25 CREAM TOPICAL at 09:27

## 2023-08-23 RX ADMIN — VANCOMYCIN HYDROCHLORIDE 125 MG: 125 CAPSULE ORAL at 18:07

## 2023-08-23 RX ADMIN — METRONIDAZOLE 500 MG: 500 INJECTION, SOLUTION INTRAVENOUS at 13:27

## 2023-08-23 RX ADMIN — MULTIPLE VITAMINS W/ MINERALS TAB 1 TABLET: TAB at 09:26

## 2023-08-23 RX ADMIN — POTASSIUM CHLORIDE 10 MEQ: 10 INJECTION, SOLUTION INTRAVENOUS at 17:36

## 2023-08-23 RX ADMIN — PANTOPRAZOLE SODIUM 40 MG: 40 INJECTION, POWDER, FOR SOLUTION INTRAVENOUS at 09:27

## 2023-08-23 RX ADMIN — SODIUM CHLORIDE, POTASSIUM CHLORIDE, SODIUM LACTATE AND CALCIUM CHLORIDE: 600; 310; 30; 20 INJECTION, SOLUTION INTRAVENOUS at 17:30

## 2023-08-23 RX ADMIN — LATANOPROST 1 DROP: 50 SOLUTION OPHTHALMIC at 20:58

## 2023-08-23 RX ADMIN — PREDNISONE 10 MG: 10 TABLET ORAL at 09:26

## 2023-08-23 RX ADMIN — VANCOMYCIN HYDROCHLORIDE 125 MG: 125 CAPSULE ORAL at 13:22

## 2023-08-23 RX ADMIN — Medication 1000 UNITS: at 09:26

## 2023-08-23 RX ADMIN — LIDOCAINE HYDROCHLORIDE 5 ML: 10 INJECTION, SOLUTION EPIDURAL; INFILTRATION; INTRACAUDAL; PERINEURAL at 12:22

## 2023-08-23 RX ADMIN — MAGNESIUM SULFATE HEPTAHYDRATE 2000 MG: 40 INJECTION, SOLUTION INTRAVENOUS at 09:37

## 2023-08-23 RX ADMIN — SODIUM CHLORIDE, PRESERVATIVE FREE 10 ML: 5 INJECTION INTRAVENOUS at 09:27

## 2023-08-23 RX ADMIN — VANCOMYCIN HYDROCHLORIDE 125 MG: 125 CAPSULE ORAL at 23:30

## 2023-08-23 RX ADMIN — SODIUM CHLORIDE, PRESERVATIVE FREE 10 ML: 5 INJECTION INTRAVENOUS at 20:56

## 2023-08-23 RX ADMIN — HYDROCORTISONE 2.5%: 25 CREAM TOPICAL at 20:58

## 2023-08-23 RX ADMIN — METRONIDAZOLE 500 MG: 500 INJECTION, SOLUTION INTRAVENOUS at 20:56

## 2023-08-23 RX ADMIN — METRONIDAZOLE 500 MG: 500 INJECTION, SOLUTION INTRAVENOUS at 05:31

## 2023-08-23 RX ADMIN — METOPROLOL SUCCINATE 50 MG: 50 TABLET, EXTENDED RELEASE ORAL at 09:26

## 2023-08-23 RX ADMIN — CALCIUM 500 MG: 500 TABLET ORAL at 09:26

## 2023-08-23 ASSESSMENT — ENCOUNTER SYMPTOMS
CHEST TIGHTNESS: 0
EYES NEGATIVE: 1
ABDOMINAL DISTENTION: 0
BLOOD IN STOOL: 1
COUGH: 0

## 2023-08-23 ASSESSMENT — PAIN SCALES - WONG BAKER
WONGBAKER_NUMERICALRESPONSE: 2
WONGBAKER_NUMERICALRESPONSE: 0
WONGBAKER_NUMERICALRESPONSE: 0

## 2023-08-23 NOTE — PROGRESS NOTES
Comprehensive Nutrition Assessment    RECOMMENDATIONS:  PO Diet: Continue Regular; Low sodium  ONS: Continue Ensure +HP tid  Nutrition Education: No recommendation at this time       NUTRITION ASSESSMENT:   Nutritional summary & status: Follow up. Pt NPO for IR drainage of pleural effusion today. Diet now advanced to  Regular: Low Sodium diet. Meal intakes usually good  at % most meals. Stated that he likes the Ensure supplements and wishes to continue receiving tid. Continue to follow. Admission // PMH: Acute anemia//Prostate Cancer, GERD, CKD, CHF    MALNUTRITION ASSESSMENT  Context of Malnutrition: Chronic Illness   Malnutrition Status: Severe malnutrition  Findings of the 6 clinical characteristics of malnutrition (Minimum of 2 out of 6 clinical characteristics is required to make the diagnosis of moderate or severe Protein Calorie Malnutrition based on AND/ASPEN Guidelines):  Energy Intake:  75% or less estimated energy requirements for 1 month or longer  Weight Loss:  20% over 1 year     Body Fat Loss:  Severe body fat loss Orbital, Triceps, Buccal region   Muscle Mass Loss:  Severe muscle mass loss Temples (temporalis), Clavicles (pectoralis & deltoids), Thigh (quadraceps), Calf (gastrocnemius)  Fluid Accumulation:  No significant fluid accumulation     Strength:  Not Performed    NUTRITION DIAGNOSIS   Severe malnutrition related to catabolic illness, inadequate protein-energy intake as evidenced by Criteria as identified in malnutrition assessment    Nutrition Monitoring and Evaluation:   Food/Nutrient Intake Outcomes:  Food and Nutrient Intake, Supplement Intake  Physical Signs/Symptoms Outcomes:  Biochemical Data     OBJECTIVE DATA: Significant to nutrition assessment  Nutrition Related Findings: +BM 8/22(red). +2 RLE pitting/+1 LLE non pitting edema. Phos 2.0 Mg 1.6 Na 132.   Wounds: Stage II (coccyx)  Nutrition Goals: PO intake 75% or greater, prior to discharge     CURRENT NUTRITION THERAPIES  ADULT DIET; Regular; Low Sodium (2 gm)  ADULT ORAL NUTRITION SUPPLEMENT; Breakfast, Lunch, Dinner; Standard High Calorie/High Protein Oral Supplement  PO Intake: %   PO Supplement Intake:Unable to assess (no data avail; pt states that he is drinking)  Additional Sources of Calories/IVF:n/a     COMPARATIVE STANDARDS  Energy (kcal):  7538-3091 (30-35 kcal/CBW)     Protein (g):  65-81 (1.2-1.5 gm/CBW)       Fluid (ml/day):  1 ml/kcal or per MD    ANTHROPOMETRICS  Current Height: 5' 9\" (175.3 cm)  Current Weight - Scale: 114 lb (51.7 kg)    Admission weight: 120 lb 4.8 oz (54.6 kg)    The patient will be monitored per nutrition standards of care. Consult dietitian if additional nutrition interventions are needed prior to RD reassessment.      Bowen Samson, [address]:  [phone]  Office:  [phone]

## 2023-08-23 NOTE — PROGRESS NOTES
Spoke with Dr. Alexus Kapoor at bedside regarding patient's condition. Ok  to give transfusion to give patient volume  and to help increase bp and start LR. Hold off on albumin. Reordered blood transfusion as blood bank sates they do not have a prepare order.

## 2023-08-23 NOTE — CARE COORDINATION
10:42am: chart review completed. Pt now requiring 10 liters of o2. Spoke with RN who states will speak with MD/Resident when they see pt about having palliative care speak with pt and his POA again. Pt's precert is good through tomorrow for the Kira; will need started again when appropriate.      Alvin ROSALES, MAKEDA   for [facility] ([address])  Office Phone: [phone]  [facility] Mobile: [phone]

## 2023-08-23 NOTE — PROGRESS NOTES
Internal Medicine Progress Note    Date: 8/23/2023   Patient: Pascual Rodriguez  Admitted: 8/9/2023   Hospital Day: 14      CC: Abnormal Labs      Interval Hx     Patient was seen this AM, resting in bed. States he slept well. Yesterday the CT showed a left pleural effusion, so he was placed on lasix. However, last night his BP dropped to 94/52, so the lasix was held. Overnight, he desatted to 82% on 3.5L of oxygen. His oxygen requirement was increased to 10L high flow oxygen. The nurse tried to wean him down to 9.5L but he desatted to 88%. Is now back on the 10L high flow, resting comfortably. He is markedly more lethargic and less alert as compared to the past few days. Does not complain of any chest pain, shortness of breath or abdominal pain. He does not have any new acute complaints. His Mg is low again to 1.6. His hemoglobin is stable at 8.4 but he has been having bloody bowel movements this morning. Mike Walls HPI:   Sophie Virk is a 74 y. o.male with a PMHx of relapsed metastatic prostate cancer, A-flutter, CHF, CKD, GERD presented from [facility] after abnormal hemoglobin level of 5.7. He was transferred here for blood transfusion. Patient has no complaints but a bit frustrated that he had to come in to get transfusion. He follows oncologist Dr. Thom Collins [facility]). Patient states that he had his chemo 2 weeks ago and the next one is due in a week. Denies any fever, chills, CP, SOB at rest, N/V, lightheadedness, dizziness, abdominal pain, constipation, BRBPR, dark stool or any urinary changes but endorsees SOB with exertion and diarrhea after chemo. Of note, patient was recently discharged on 7/15, presented with similar labs and SOB. Was treated for sepsis secondary to pneumonia and was transfused 1 unit of blood. On arrival to the ED, patient was afebrile and hemodynamically stable, saturating well on room air. Labs were significant for elevated lactic acid of 2.3 and pancytopenic.  Hyponatremic and

## 2023-08-23 NOTE — PROGRESS NOTES
Pt was on 3.5L NC and O2 was 83%. Pt was bumped up to 6L NC and still not above 90%. RT was called and placed Pt on 10L high flow. Pt's stats are 93% right now with 10L high flow. Pt doesn't feel short of breath or struggling to breath. MD made aware. No new orders placed. Pt was also placed on continuous O2 monitoring.

## 2023-08-23 NOTE — PROGRESS NOTES
Meet with patient, Deacon Nolasco along with VIVEK Foster CNP for goals of care discussion. The primary and Oncology team are in agreement that patient is appropriate for hospice care given the continued progression of patient's metastatic prostate cancer. Patient is in agreement to change his code status to DNR CC. Hospice referral will be placed for [address] to see him tomorrow as he is going to be discharged to [facility]. Updated patient's RN and Dr Natalie Rogers.      Ashley Cooley MD

## 2023-08-23 NOTE — PROGRESS NOTES
HGB was 7.5 this AM (08/20/2023)  - Cont prednisone 20 mg daily  - PT: 8/24 OT:10/24    Hematochezia poss 2/2 radiation proctitis vs C. Diff   - Patient had 8 bloody bowel movements on 8/18.    -Prior colonoscopy on 2/8/2018   -Showed tubular adenoma in the transverse colon, mild chronic inactive gastritis small hiatal hernia, polyp removed from hepatic flexure in the ascending colon    - GI consulted: kathryn angel  -Had colonoscopy on 8/19 with Dr Aris Valdivia  -PAT prior to colonoscopy showed no masses good rectal tone but blood was coming out. Rectum fairly ulcerated with several areas of volcanic eruptions and lots of hemorrhage. The tissue was extremely friable     Sigmoid: friable tissue multiple volcanic eruptions and inflammation due to the underlying C. difficile colitis. Procedure terminated. Bleeding thought to be caused by low platelet and C. difficile colitis. Impression: Underlying c.diff and radiation proctitis. C Diff.  - Patient's stool positive for C Diff. 08/17/2023  - Started on Vancomycin Q6h for 10 days (08/19)  - GI recommended IV flagyl, started (08/20)    Fever 2/2 related to cancer, low concern for PNA  CT showed moderate to large L pleural effusion, increased when compared to previous study in July. Noted new 5 mm pulmonary nodule in NINA. Interval development of peribronchovascular consolidation in R > L. For this pleural effusion, we will discuss with pulm about possibly draining. Platelets are at 41,505 today, so will consider ordering platelet transfusion, but there is a city-wide shortage.  - Discussion with Pulmonology     Prostate cancer w mets  - Diagnosed in 2014, remission in 2020, and relapsed in 2022. PET/CT showed widespread mets to bone. Received radiation and started on Eligard/Abiraterone Acetate.  MRI in April 2023 showed bulky mets at T1,T2, T4, T11, T12, and L1. CT C/A/P in May 2023 showed mets to liver with numerous large lytic lesions extending into epidural space at

## 2023-08-23 NOTE — PROCEDURES
CT guided thoracentesis performed by Dr. Marcos Rueda. Pt was monitored and vitals taken by [facility]. No sedation medications neccessary - lidocaine at site. Removed 450mls of serosanguinous clear fluid from left pleural effusion. Samples sent to lab. CXR done in room. Pt returned to room with [facility] RN in stable condition.

## 2023-08-23 NOTE — PROGRESS NOTES
Physical Therapy/Occupational Therapy  HOLD    Chart reviewed. Spoke with nursing. Pt with increased oxygen requirements this AM (10L high flow.) RN requests therapy to hold this AM and check back later in day. Will check back in PM as schedule allows. Otherwise will continue per plan of care as appropriate. Lore Colby #[id number]      Mimi Leonard, OTR/L [id number]      Addendum: 12:30pm  Chart reviewed. Spoke with nursing. Pt to have a thoracentesis today. Will hold PT/OT today. Continue per plan of care as appropriate.      Lore Colby #[id number]      Mimi Leonard, OTR/L [id number]

## 2023-08-23 NOTE — PROGRESS NOTES
6422 RN notified MD Pillo Moseley in regards to patient's am vitals see flow sheet , ok to hold lasix and ok to give meds due to pt is NPO? Ok todraw lactic acid ? Ok to hold lasix and ok to give meds with sip of water per md and ok to lab. Md notified that pt had a bloody bowl movement . MD changed blood work order to h&h every 8 hours .

## 2023-08-23 NOTE — PROGRESS NOTES
4 Eyes Skin Assessment     NAME:  Traci Torres OF BIRTH:  1950  MEDICAL RECORD NUMBER:  [id number]    The patient is being assessed for  Shift Handoff    I agree that at least one RN has performed a thorough Head to Toe Skin Assessment on the patient. ALL assessment sites listed below have been assessed. Areas assessed by both nurses:    Head, Face, Ears, Shoulders, Back, Chest, Arms, Elbows, Hands, Sacrum. Buttock, Coccyx, Ischium, Legs. Feet and Heels, and Under Medical Devices         Does the Patient have a Wound? Yes wound(s) were present on assessment.  LDA wound assessment was Initiated and completed by RN       Reddy Prevention initiated by RN: Yes  Wound Care Orders initiated by RN: Yes    Pressure Injury (Stage 3,4, Unstageable, DTI, NWPT, and Complex wounds) if present, place Wound referral order by RN under : No    New Ostomies, if present place, Ostomy referral order under : No     Nurse 1 eSignature: Electronically signed by Malia Arrington RN on 8/23/23 at 7:23 PM EDT    **SHARE this note so that the co-signing nurse can place an eSignature**    Nurse 2 eSignature: Electronically signed by Barbara Guillen RN on 8/23/23 at 7:30 PM EDT

## 2023-08-23 NOTE — PROGRESS NOTES
Palliative Care Chart Review  and Check in Note:     NAME:  Daksha Nick  Admit Date: 8/9/2023  Hospital Day:  Hospital Day: 15   Current Code status: Limited    Palliative care is continuing to following Mr. Sean Mckinley for symptom management,  and goals of care discussion as needed. Patient's chart reviewed today 8/23/23. Met with pt and his MO Parks at the bedside along with Dr. Peña Parson. Discussed goals of care, code status, and hospice. The pt expressed the desire to live as long as possible, but after discussing his medical situation extensively, he is in agreement with transitioning to comfort care. Chase Mims is in agreement. The following are the currently established goals/code status, and Symptom management. Goals of care: Pt is in agreement with comfort care.     Code status: DNR-CC    Discharge plan: d/c to [facility] inpatient unit tomorrow    Keyur Spencer NP  [address] [facility]

## 2023-08-23 NOTE — PROGRESS NOTES
RN notified MD William Brandt that pt's blood pressures are very low (see flowsheet). Asked if they wanted to order a bolus. RN ordered stat EKG for irregular HR. MD okay for 1 L bolus and MD came to bedside.

## 2023-08-24 VITALS
HEART RATE: 93 BPM | BODY MASS INDEX: 16.88 KG/M2 | SYSTOLIC BLOOD PRESSURE: 109 MMHG | DIASTOLIC BLOOD PRESSURE: 77 MMHG | WEIGHT: 114 LBS | RESPIRATION RATE: 24 BRPM | TEMPERATURE: 100 F | HEIGHT: 69 IN | OXYGEN SATURATION: 87 %

## 2023-08-24 LAB
ACID FAST STN SPEC QL: NORMAL
ALBUMIN SERPL-MCNC: 2.3 G/DL (ref 3.4–5)
ANION GAP SERPL CALCULATED.3IONS-SCNC: 10 MMOL/L (ref 3–16)
ANISOCYTOSIS BLD QL SMEAR: ABNORMAL
BASOPHILS # BLD: 0 K/UL (ref 0–0.2)
BASOPHILS NFR BLD: 0 %
BUN SERPL-MCNC: 17 MG/DL (ref 7–20)
CALCIUM SERPL-MCNC: 7.6 MG/DL (ref 8.3–10.6)
CHLORIDE SERPL-SCNC: 96 MMOL/L (ref 99–110)
CO2 SERPL-SCNC: 23 MMOL/L (ref 21–32)
CREAT SERPL-MCNC: 0.7 MG/DL (ref 0.8–1.3)
DEPRECATED RDW RBC AUTO: 17 % (ref 12.4–15.4)
EKG ATRIAL RATE: 300 BPM
EKG DIAGNOSIS: NORMAL
EKG Q-T INTERVAL: 314 MS
EKG QRS DURATION: 72 MS
EKG QTC CALCULATION (BAZETT): 427 MS
EKG R AXIS: 71 DEGREES
EKG T AXIS: 61 DEGREES
EKG VENTRICULAR RATE: 111 BPM
EOSINOPHIL # BLD: 0 K/UL (ref 0–0.6)
EOSINOPHIL NFR BLD: 0 %
GFR SERPLBLD CREATININE-BSD FMLA CKD-EPI: >60 ML/MIN/{1.73_M2}
GLUCOSE FLD-MCNC: 92 MG/DL
GLUCOSE SERPL-MCNC: 113 MG/DL (ref 70–99)
HCT VFR BLD AUTO: 27.6 % (ref 40.5–52.5)
HGB BLD-MCNC: 9.6 G/DL (ref 13.5–17.5)
LYMPHOCYTES # BLD: 0 K/UL (ref 1–5.1)
LYMPHOCYTES NFR BLD: 0 %
MAGNESIUM SERPL-MCNC: 1.7 MG/DL (ref 1.8–2.4)
MCH RBC QN AUTO: 29.5 PG (ref 26–34)
MCHC RBC AUTO-ENTMCNC: 34.7 G/DL (ref 31–36)
MCV RBC AUTO: 84.8 FL (ref 80–100)
MICROCYTES BLD QL SMEAR: ABNORMAL
MONOCYTES # BLD: 0.3 K/UL (ref 0–1.3)
MONOCYTES NFR BLD: 3 %
NEUTROPHILS # BLD: 8.2 K/UL (ref 1.7–7.7)
NEUTROPHILS NFR BLD: 97 %
NRBC BLD-RTO: 1 /100 WBC
OVALOCYTES BLD QL SMEAR: ABNORMAL
PH FLD STRIP: 7 [PH]
PHOSPHATE SERPL-MCNC: 1.9 MG/DL (ref 2.5–4.9)
PLATELET # BLD AUTO: 54 K/UL (ref 135–450)
PLATELET BLD QL SMEAR: ABNORMAL
PMV BLD AUTO: 9 FL (ref 5–10.5)
POTASSIUM SERPL-SCNC: 4.1 MMOL/L (ref 3.5–5.1)
RBC # BLD AUTO: 3.26 M/UL (ref 4.2–5.9)
SODIUM SERPL-SCNC: 129 MMOL/L (ref 136–145)
SPECIMEN SOURCE FLD: NORMAL
WBC # BLD AUTO: 8.5 K/UL (ref 4–11)

## 2023-08-24 PROCEDURE — 6370000000 HC RX 637 (ALT 250 FOR IP)

## 2023-08-24 PROCEDURE — 6360000002 HC RX W HCPCS: Performed by: STUDENT IN AN ORGANIZED HEALTH CARE EDUCATION/TRAINING PROGRAM

## 2023-08-24 PROCEDURE — C9113 INJ PANTOPRAZOLE SODIUM, VIA: HCPCS | Performed by: STUDENT IN AN ORGANIZED HEALTH CARE EDUCATION/TRAINING PROGRAM

## 2023-08-24 PROCEDURE — 99239 HOSP IP/OBS DSCHRG MGMT >30: CPT | Performed by: HOSPITALIST

## 2023-08-24 PROCEDURE — 2700000000 HC OXYGEN THERAPY PER DAY

## 2023-08-24 PROCEDURE — 83735 ASSAY OF MAGNESIUM: CPT

## 2023-08-24 PROCEDURE — 80069 RENAL FUNCTION PANEL: CPT

## 2023-08-24 PROCEDURE — 36591 DRAW BLOOD OFF VENOUS DEVICE: CPT

## 2023-08-24 PROCEDURE — 6360000002 HC RX W HCPCS

## 2023-08-24 PROCEDURE — 94761 N-INVAS EAR/PLS OXIMETRY MLT: CPT

## 2023-08-24 PROCEDURE — 6360000002 HC RX W HCPCS: Performed by: INTERNAL MEDICINE

## 2023-08-24 PROCEDURE — 93010 ELECTROCARDIOGRAM REPORT: CPT | Performed by: INTERNAL MEDICINE

## 2023-08-24 PROCEDURE — 2580000003 HC RX 258

## 2023-08-24 PROCEDURE — 85025 COMPLETE CBC W/AUTO DIFF WBC: CPT

## 2023-08-24 PROCEDURE — 6370000000 HC RX 637 (ALT 250 FOR IP): Performed by: INTERNAL MEDICINE

## 2023-08-24 RX ORDER — VANCOMYCIN HYDROCHLORIDE 125 MG/1
125 CAPSULE ORAL 4 TIMES DAILY
Qty: 6 CAPSULE | Refills: 0 | Status: SHIPPED | OUTPATIENT
Start: 2023-08-24 | End: 2023-08-24 | Stop reason: SDUPTHER

## 2023-08-24 RX ORDER — DEXAMETHASONE 4 MG/1
TABLET ORAL
Qty: 20 TABLET | Refills: 0 | Status: SHIPPED | OUTPATIENT
Start: 2023-08-24

## 2023-08-24 RX ORDER — AMIODARONE HYDROCHLORIDE 200 MG/1
200 TABLET ORAL DAILY
Qty: 30 TABLET | Refills: 2 | Status: SHIPPED | OUTPATIENT
Start: 2023-08-25

## 2023-08-24 RX ORDER — METRONIDAZOLE 500 MG/1
500 TABLET ORAL 2 TIMES DAILY
Qty: 12 TABLET | Refills: 0 | Status: SHIPPED | OUTPATIENT
Start: 2023-08-24 | End: 2023-08-30

## 2023-08-24 RX ORDER — VANCOMYCIN HYDROCHLORIDE 125 MG/1
125 CAPSULE ORAL 4 TIMES DAILY
Qty: 5 CAPSULE | Refills: 0 | Status: SHIPPED | OUTPATIENT
Start: 2023-08-24 | End: 2023-08-26

## 2023-08-24 RX ORDER — METRONIDAZOLE 500 MG/1
500 TABLET ORAL 2 TIMES DAILY
Qty: 14 TABLET | Refills: 0 | Status: SHIPPED | OUTPATIENT
Start: 2023-08-24 | End: 2023-08-24 | Stop reason: SDUPTHER

## 2023-08-24 RX ADMIN — CALCIUM 500 MG: 500 TABLET ORAL at 08:53

## 2023-08-24 RX ADMIN — FUROSEMIDE 20 MG: 10 INJECTION, SOLUTION INTRAMUSCULAR; INTRAVENOUS at 18:28

## 2023-08-24 RX ADMIN — VANCOMYCIN HYDROCHLORIDE 125 MG: 125 CAPSULE ORAL at 05:40

## 2023-08-24 RX ADMIN — METRONIDAZOLE 500 MG: 500 INJECTION, SOLUTION INTRAVENOUS at 05:40

## 2023-08-24 RX ADMIN — PANTOPRAZOLE SODIUM 40 MG: 40 INJECTION, POWDER, FOR SOLUTION INTRAVENOUS at 08:52

## 2023-08-24 RX ADMIN — METOPROLOL SUCCINATE 50 MG: 50 TABLET, EXTENDED RELEASE ORAL at 12:08

## 2023-08-24 RX ADMIN — SODIUM CHLORIDE, PRESERVATIVE FREE 10 ML: 5 INJECTION INTRAVENOUS at 10:33

## 2023-08-24 RX ADMIN — Medication 1000 UNITS: at 08:53

## 2023-08-24 RX ADMIN — MULTIPLE VITAMINS W/ MINERALS TAB 1 TABLET: TAB at 08:53

## 2023-08-24 RX ADMIN — METRONIDAZOLE 500 MG: 500 INJECTION, SOLUTION INTRAVENOUS at 13:15

## 2023-08-24 RX ADMIN — MORPHINE SULFATE 15 MG: 15 TABLET, FILM COATED, EXTENDED RELEASE ORAL at 08:53

## 2023-08-24 RX ADMIN — VANCOMYCIN HYDROCHLORIDE 125 MG: 125 CAPSULE ORAL at 11:58

## 2023-08-24 RX ADMIN — ACETAMINOPHEN 650 MG: 325 TABLET ORAL at 11:58

## 2023-08-24 RX ADMIN — AMIODARONE HYDROCHLORIDE 200 MG: 200 TABLET ORAL at 10:30

## 2023-08-24 RX ADMIN — VANCOMYCIN HYDROCHLORIDE 125 MG: 125 CAPSULE ORAL at 18:28

## 2023-08-24 ASSESSMENT — ENCOUNTER SYMPTOMS
EYES NEGATIVE: 1
COUGH: 0
BLOOD IN STOOL: 1
ABDOMINAL DISTENTION: 0
CHEST TIGHTNESS: 0

## 2023-08-24 ASSESSMENT — PAIN SCALES - GENERAL: PAINLEVEL_OUTOF10: 0

## 2023-08-24 NOTE — CONSENT
Informed Consent for Blood Component Transfusion Note    I have discussed with the patient the rationale for blood component transfusion; its benefits in treating or preventing fatigue, organ damage, or death; and its risk which includes mild transfusion reactions, rare risk of blood borne infection, or more serious but rare reactions. I have discussed the alternatives to transfusion, including the risk and consequences of not receiving transfusion. The patient had an opportunity to ask questions and had agreed to proceed with transfusion of blood components.     Electronically signed by Beverly Juarez MD on 8/24/23 at 3:20 PM EDT

## 2023-08-24 NOTE — PLAN OF CARE
Problem: Nutrition Deficit:  Goal: Optimize nutritional status  Outcome: Not Progressing- pt consumed minimal food today during shift. Encouraged Ensure consumption when possible. Plan of care ongoing. Problem: Respiratory - Adult  Goal: Achieves optimal ventilation and oxygenation  8/24/2023 1801 by Po Hanson RN  Outcome: Not Progressing- pt continued to sat high 80s during the shift on 15L high flow nasal cannula. Reported no SOB or discomfort. Plan of care ongoing. Problem: Discharge Planning  Goal: Discharge to home or other facility with appropriate resources  Outcome: Progressing- patient is agreeable to going to inpatient services with [facility] this evening. Discharge orders in.

## 2023-08-24 NOTE — PROGRESS NOTES
Occupational Therapy  Discharge from Therapy    Pt discharging to [facility]. Discharge from therapy.      [address] [place] [address] DE/

## 2023-08-24 NOTE — DISCHARGE SUMMARY
INTERNAL MEDICINE DEPARTMENT AT [address] [place] [address]  DISCHARGE SUMMARY    Patient ID: Mendoza Nick                                             Discharge Date: 8/24/2023   Patient's PCP: Eligio Laguerre MD                                          Discharge Physician:  Ramona Driver Date: 8/9/2023   Admitting Physician: Micheal Young MD    PROBLEMS DURING HOSPITALIZATION:  Present on Admission:   Acute on chronic anemia   Lactic acidosis   Hypophosphatemia   Atrial fibrillation with RVR ([address])   On amiodarone therapy   Abdominal distention   C. difficile diarrhea   Hematochezia   Acute febrile illness   Pleural effusion on left   Acute lower GI bleeding      DISCHARGE DIAGNOSES:    Prostate cancer w mets  Large bilateral pleural effusion 2/2 lung mass /HF/ Pneumonia  Pancytopenia and Anemia  Hematochezia poss 2/2 radiation proctitis vs C. Diff  A. Flutter/A. Fib  Hypomagnesemia    HPI: Lucien Hughes is a 74 y. o.male with a PMHx of relapsed metastatic prostate cancer, A-flutter, CHF, CKD, GERD presented from [facility] after abnormal hemoglobin level of 5.7. He was transferred here for blood transfusion. Patient has no complaints but a bit frustrated that he had to come in to get transfusion. He follows oncologist Dr. Foster Quiros [facility]). Patient states that he had his chemo 2 weeks ago and the next one is due in a week. Denies any fever, chills, CP, SOB at rest, N/V, lightheadedness, dizziness, abdominal pain, constipation, BRBPR, dark stool or any urinary changes but endorsees SOB with exertion and diarrhea after chemo. Of note, patient was recently discharged on 7/15, presented with similar labs and SOB. Was treated for sepsis secondary to pneumonia and was transfused 1 unit of blood. On arrival to the ED, patient was afebrile and hemodynamically stable, saturating well on room air. Labs were significant for elevated lactic acid of 2.3 and pancytopenic. Hyponatremic and hypoalbuminemia.     The following issues were sounds: Normal breath sounds. Comments: On 15L high flow O2  Abdominal:      General: Abdomen is flat. Bowel sounds are normal.      Palpations: Abdomen is soft. Skin:     General: Skin is warm. Neurological:      General: No focal deficit present. Mental Status: He is alert. Psychiatric:         Mood and Affect: Mood normal.         Behavior: Behavior normal.         Thought Content:  Thought content normal.         Judgment: Judgment normal.        Consults: cardiology, GI, hematology/oncology, IR, palliative care  Significant Diagnostic Studies:  chest x-ray, CT chest w/o contrast, KUB, CT guided thoracentesis  Treatments: vancomycin, cefepime, flagyl, blood and platelet transfusions, filgrastim, amiodarone, metoprolol, lasix, Mg repacement  Disposition: hospice  Discharged Condition: Stable  Follow Up: None    DISCHARGE MEDICATION:       Medication List        START taking these medications      metroNIDAZOLE 500 MG tablet  Commonly known as: FLAGYL  Take 1 tablet by mouth 2 times daily for 6 days     vancomycin 125 MG capsule  Commonly known as: VANCOCIN  Take 1 capsule by mouth 4 times daily for 5 doses            CHANGE how you take these medications      amiodarone 200 MG tablet  Commonly known as: CORDARONE  Take 1 tablet by mouth daily  Start taking on: August 25, 2023  What changed: when to take this     dexamethasone 4 MG tablet  Commonly known as: DECADRON  Resume home dexamethasone 4 mg tablets daily  What changed: additional instructions            CONTINUE taking these medications      calcium carbonate 500 MG Tabs tablet  Commonly known as: OSCAL     HYDROcodone-acetaminophen 5-325 MG per tablet  Commonly known as: NORCO     latanoprost 0.005 % ophthalmic solution  Commonly known as: XALATAN     metoprolol succinate 50 MG extended release tablet  Commonly known as: TOPROL XL  Take 1 tablet by mouth daily     morphine 15 MG extended release tablet  Commonly known as: MS CONTIN

## 2023-08-24 NOTE — PROGRESS NOTES
4 Eyes Skin Assessment     NAME:  Lida Stallings OF BIRTH:  1950  MEDICAL RECORD NUMBER:  [id number]    The patient is being assessed for  Shift Handoff    I agree that at least one RN has performed a thorough Head to Toe Skin Assessment on the patient. ALL assessment sites listed below have been assessed. Areas assessed by both nurses:    {Pressure Areas Assessed:97407}        Does the Patient have a Wound? Yes wound(s) were present on assessment.  LDA wound assessment was Initiated and completed by RN       Reddy Prevention initiated by RN: Yes  Wound Care Orders initiated by RN: Yes    Pressure Injury (Stage 3,4, Unstageable, DTI, NWPT, and Complex wounds) if present, place Wound referral order by RN under : Yes    New Ostomies, if present place, Ostomy referral order under : No     Nurse 1 eSignature: Electronically signed by Zahida Hobbs RN on 8/24/23 at 6:32 AM EDT    **SHARE this note so that the co-signing nurse can place an eSignature**    Nurse 2 eSignature: {Esignature:[id number]}

## 2023-08-24 NOTE — PROGRESS NOTES
Internal Medicine Progress Note    Date: 8/24/2023   Patient: Marco Floor  Admitted: 8/9/2023   Hospital Day: 15      CC: Abnormal Labs      Interval Hx     Patient was seen this AM, resting in bed. States he slept well. Yesterday afternoon he was taken to IR to drain the left pleural effusion. After he returned to the room, his BP dropped to 69/52. A 1L LR bolus and a transfusion were given to improve volume status, and an EKG was obtained. It showed atrial flutter with variable AV heart block and a possible lateral infarct. Met with patient, Maryanne Addison along with VIVEK Ramirez CNP for goals of care discussion. The primary and Oncology team are in agreement that patient is appropriate for hospice care given the continued progression of patient's metastatic prostate cancer. Patient is in agreement to change his code status to DNR CC. Hospice referral was placed for [address]  to see him today. Overnight his oxygen requirements increased to 15L, he is satting at 88-92% with no respiratory distress. Patient denies chest pain, shortness of breath, abdominal pain. He had 2 episodes of bloody diarrhea. Pain is well controlled on current medications, patient is receiving continuous LR for adequate hydration and comfort. HPI:   Pham Gamble is a 74 y. o.male with a PMHx of relapsed metastatic prostate cancer, A-flutter, CHF, CKD, GERD presented from [facility] after abnormal hemoglobin level of 5.7. He was transferred here for blood transfusion. Patient has no complaints but a bit frustrated that he had to come in to get transfusion. He follows oncologist Dr. Dimitry Jordan [facility] [place]). Patient states that he had his chemo 2 weeks ago and the next one is due in a week. Denies any fever, chills, CP, SOB at rest, N/V, lightheadedness, dizziness, abdominal pain, constipation, BRBPR, dark stool or any urinary changes but endorsees SOB with exertion and diarrhea after chemo.  Of note, patient was recently discharged Calorie/High Protein Oral Supplement   Code status:  DNR-CC       Discussed with Dr. Claudio Stiles MD   -------------------------------------  Macario Borden MS3  8/24/2023    IHerbert, pgy-1, independently examined the patient, discussed management and agree with assessment and plan by Macario Borden, [address] [place] [address]. Herbert Ivey MD  PGY-1, Internal Medicine  08/24/23  11:48 AM     Addendum to Resident H& P/Progress note:  I have personally seen,examined and evaluated the patient.  I have reviewed the current history, physical findings, labs and assessment and plan and agree with note as documented by MS 3, Macario Boredn, and resident MD ( Justen Byrnes)      Claudio Stiles MD, Abbey Mei

## 2023-08-24 NOTE — PROGRESS NOTES
Patient discharged at 80. Report given to transporter. Patient is being discharged with IV access:      Type:  PAC- NS locked                    Patient verbalized understanding of all instructions and questions were answered to his. Satisfaction. Patient discharged to hospice per stretcher via ambulance.     Art Patel RN

## 2023-08-24 NOTE — PROGRESS NOTES
Physical Therapy  Discharge    Chart reviewed. Spoke with nursing. Pt/family now opting for inpatient hospice. Will D/C from PT services at this time. Please reorder if treatment plan changes.     Lore Colby #[id number]

## 2023-08-24 NOTE — PLAN OF CARE
Problem: Pain  Goal: Verbalizes/displays adequate comfort level or baseline comfort level  Outcome: Progressing  Flowsheets (Taken 8/24/2023 0454)  Verbalizes/displays adequate comfort level or baseline comfort level:   Encourage patient to monitor pain and request assistance   Assess pain using appropriate pain scale  Note: Pt w/o complaints of pain, no PRNs administered. Problem: Safety - Adult  Goal: Free from fall injury  Outcome: Progressing  Note:  Explained fall risk precautions to pt and family and rationale behind their use to keep the patient safe. Pt bed is in low position, side rails up, call light and belongings are in reach. Bed alarm on. Pt encouraged to call for assistance. Will continue with hourly rounds for PO intake, pain needs, toileting and repositioning as needed. Problem: Hematologic - Adult  Goal: Maintains hematologic stability  Outcome: Progressing  Note:   Patient's hemoglobin this AM:   Recent Labs     08/24/23  0330   HGB 9.6*     Patient's platelet count this AM:   Recent Labs     08/24/23  0330   PLT 54*    Thrombocytopenia Precautions in place. Patient showing no signs or symptoms of active bleeding. Transfusion not indicated at this time. Patient verbalizes understanding of all instructions. Will continue to assess and implement POC. Call light within reach and hourly rounding in place. Problem: Gastrointestinal - Adult  Goal: Maintains or returns to baseline bowel function  Outcome: Not Progressing  Flowsheets (Taken 8/24/2023 0454)  Maintains or returns to baseline bowel function:   Assess bowel function   Administer IV fluids as ordered to ensure adequate hydration   Administer ordered medications as needed  Note: Pt had diarrhea x2 during shift. Pt receiving continuous LR to ensure adequate hydration.      Problem: Respiratory - Adult  Goal: Achieves optimal ventilation and oxygenation  Outcome: Not Progressing  Flowsheets (Taken 8/24/2023 0459)  Lucien Avelar

## 2023-08-24 NOTE — CARE COORDINATION
Addendum at 1:43pm:          Case Management Assessment            Discharge Note                    Date / Time of Note: 8/24/2023 1:43 PM                  Discharge Note Completed by: Pierce ROSALES, MAKEDA   for [facility]  Office Phone: [phone] [address] [facility] Mobile: [phone]    Patient Name: Frederick Knapp   YOB: 1950  Diagnosis: Pancytopenia [facility]) [D61.818]  Acute anemia [D64.9]   Date / Time: 8/9/2023  4:32 PM    Current PCP: Kam Bejarano MD  Clinic patient: No    Hospitalization in the last 30 days: Yes  Readmission Assessment  Number of Days since last admission?: 8-30 days  Previous Disposition: SNF  Who is being Interviewed: Patient  What was the patient's/caregiver's perception as to why they think they needed to return back to the hospital?: Other (Comment) (abnormal labs)  Did you visit your Primary Care Physician after you left the hospital, before you returned this time?: No  Why weren't you able to visit your PCP?: Other (Comment) (SNF)  Did you see a specialist, such as Cardiac, Pulmonary, Orthopedic Physician, etc. after you left the hospital?: Yes  Who advised the patient to return to the hospital?: Physician  Does the patient report anything that got in the way of taking their medications?: No  In our efforts to provide the best possible care to you and others like you, can you think of anything that we could have done to help you after you left the hospital the first time, so that you might not have needed to return so soon?: Other (Comment) (nothing could have been done to prevent)    Advance Directives:  Code Status: [facility] DNR form completed and on chart: Yes    Financial:  Payor: Barbra Marker / Plan: Hector Godinez ESSENTIAL/PLUS / Product Type: *No Product type* /      Pharmacy:    Dilip Coronado [id number]  MARIAN, [address] [place] [address] -  [phone]  [address] [place] [address] order noted. Spoke with RAKEL Aaron with [address]  who states consents have been signed and the DNR has been obtained. Pt will discharge to [address] [place] [address] at 7pm. RN present and aware of the information. Charge RN also aware of pickup time. No further needs identified. COVID Result:    Lab Results   Component Value Date/Time    COVID19 Not Detected 07/13/2023 10:05 AM       The Plan for Transition of Care is related to the following treatment goals of Pancytopenia ([address]) [G51.869]  Acute anemia [D64.9]    Dimitri ROSALES, CHRISSY-S   for [facility])  Office Phone: [phone]  [address] [place] [address] Road: [phone]      Addendum at 11:03am: Received call from [facility] with [address] stating she is on her way and pt has transport via [place] at 7pm to go to their [employer] unit. She stated once consents are signed, will update staff to notify MD.     8:46am: Spoke with Khalida Valdez, [address] CNP who states pt and his POA wants a referral to [employer] ([address]) for their [facility]. Khalida Valdez states will update Karma Boehringer at [address]. Referral called to Stephane Martinez at [address] who is aware pt is needing 15 liters of o2 and requested meeting ASAP for the [facility]. Stephane Martinez states Tanya Watson is off and the RN that is covering starts at 10am but will have them contact  once meeting time is arranged with Charly/JULI.      Edy Shah RN aware    Dimitri ROSALES, CHRISSY-S   for [facility] ([address])  Office Phone: [phone]  [facility] Mobile: [phone]

## 2023-08-24 NOTE — PROGRESS NOTES
Patient discharging to Hospice IPU tonight--agree with this plan  Oncology will sign off     AMADA Navarro-BC   Medical Oncology  [facility]

## 2023-08-25 LAB
BACTERIA BLD CULT ORG #2: NORMAL
BACTERIA BLD CULT: NORMAL
LOEFFLER MB STN SPEC: NORMAL

## 2023-08-28 LAB
FUNGUS BLD CULT: NORMAL
FUNGUS BLD CULT: NORMAL

## 2023-09-04 LAB
FUNGUS BLD CULT: NORMAL
FUNGUS BLD CULT: NORMAL
FUNGUS SPEC CULT: NORMAL
LOEFFLER MB STN SPEC: NORMAL

## 2023-09-05 LAB
ACID FAST STN SPEC QL: NORMAL
MYCOBACTERIUM SPEC CULT: NORMAL

## 2023-09-11 LAB
FUNGUS BLD CULT: NORMAL
FUNGUS BLD CULT: NORMAL
FUNGUS SPEC CULT: ABNORMAL
FUNGUS SPEC CULT: NORMAL
LOEFFLER MB STN SPEC: ABNORMAL
LOEFFLER MB STN SPEC: NORMAL
ORGANISM: ABNORMAL

## 2023-09-12 LAB
ACID FAST STN SPEC QL: NORMAL
MYCOBACTERIUM SPEC CULT: NORMAL

## 2023-09-18 LAB
FUNGUS SPEC CULT: NORMAL
LOEFFLER MB STN SPEC: NORMAL

## 2023-09-19 LAB
ACID FAST STN SPEC QL: NORMAL
MYCOBACTERIUM SPEC CULT: NORMAL

## 2023-09-25 LAB
FUNGUS SPEC CULT: NORMAL
LOEFFLER MB STN SPEC: NORMAL

## 2023-09-26 LAB
ACID FAST STN SPEC QL: NORMAL
MYCOBACTERIUM SPEC CULT: NORMAL

## 2023-10-03 LAB
ACID FAST STN SPEC QL: NORMAL
MYCOBACTERIUM SPEC CULT: NORMAL

## 2023-10-10 LAB
ACID FAST STN SPEC QL: NORMAL
MYCOBACTERIUM SPEC CULT: NORMAL

## 2024-12-11 NOTE — PROGRESS NOTES
PDMP reviewed. Refill approved.      Yumiko [place] DO     PORT PLACEMENT, RIGHT INTERNAL JUGULAR     Dr Ashley Aldridge    Current Allergies: Patient has no known allergies. No results for input(s): POCGLU in the last 72 hours. Admitted to PACU bed 13 from OR. Arrived on a stretcher. Patient to be discharged to home. Attached to PACU monitoring system. Alarms and parameters set. Report received from anesthesia personnel. [facility] staff did not report skin issues that were observed while in OR, or if admitted with skin issue. No problems reported intraoperatively. Pt arrived with oxygen per nasal cannula with oxygen at 4 liters. Athrombic wraps in place. Removed for discharge    Surgical site to the right chest .  Closed with surgical glue. Doctors aware of all labs and diagnostics before coming to recovery.

## (undated) DEVICE — GLOVE SURG SZ 7 L12IN FNGR THK75MIL WHT LTX POLYMER BEAD

## (undated) DEVICE — SUTURE ETHBND 2-0 L30IN NONABSORBABLE GRN L22MM SH-1 1/2 MX763

## (undated) DEVICE — GOWN,SIRUS,POLYRNF,BRTHSLV,LG,30/CS: Brand: MEDLINE

## (undated) DEVICE — SUTURE VCRL + SZ 3-0 L18IN ABSRB UD SH 1/2 CIR TAPERCUT NDL VCP864D

## (undated) DEVICE — LIQUIBAND RAPID ADHESIVE 36/CS 0.8ML: Brand: MEDLINE

## (undated) DEVICE — SCANLAN® SUTURE BOOT™ INSTRUMENT JAW COVERS - ORIGINAL YELLOW, STANDARD PKG (5 PAIR/CARTRIDGE, 1 CARTRIDGE/PKG): Brand: SCANLAN® SUTURE BOOT™ INSTRUMENT JAW COVERS

## (undated) DEVICE — GLOVE SURG SZ 7 L12IN FNGR THK79MIL GRN LTX FREE

## (undated) DEVICE — TOWEL,STOP FLAG GOLD N-W: Brand: MEDLINE

## (undated) DEVICE — PORT INSERTION: Brand: MEDLINE INDUSTRIES, INC.